# Patient Record
Sex: MALE | Race: WHITE | NOT HISPANIC OR LATINO | Employment: FULL TIME | URBAN - METROPOLITAN AREA
[De-identification: names, ages, dates, MRNs, and addresses within clinical notes are randomized per-mention and may not be internally consistent; named-entity substitution may affect disease eponyms.]

---

## 2019-12-09 LAB
INR BLD: 2.5 (ref 0.9–1.1)
PROTHROMBIN TIME: 29 SEC (ref 9.7–12.7)

## 2023-02-25 LAB
INR IN PPP BY COAGULATION ASSAY: 2 (ref 0.9–1.1)
PROTHROMBIN TIME (PT) IN PPP BY COAGULATION ASSAY: 23.6 SEC (ref 9.8–13.4)

## 2023-04-01 ENCOUNTER — APPOINTMENT (OUTPATIENT)
Dept: LAB | Facility: LAB | Age: 68
End: 2023-04-01
Payer: COMMERCIAL

## 2023-04-01 LAB
INR IN PPP BY COAGULATION ASSAY: 2.4 (ref 0.9–1.1)
PROTHROMBIN TIME (PT) IN PPP BY COAGULATION ASSAY: 28.6 SEC (ref 9.8–13.4)

## 2023-04-28 LAB
ALANINE AMINOTRANSFERASE (SGPT) (U/L) IN SER/PLAS: 22 U/L (ref 10–52)
ALBUMIN (G/DL) IN SER/PLAS: 4.5 G/DL (ref 3.4–5)
ALKALINE PHOSPHATASE (U/L) IN SER/PLAS: 87 U/L (ref 33–136)
ANION GAP IN SER/PLAS: 10 MMOL/L (ref 10–20)
ASPARTATE AMINOTRANSFERASE (SGOT) (U/L) IN SER/PLAS: 14 U/L (ref 9–39)
BILIRUBIN TOTAL (MG/DL) IN SER/PLAS: 0.9 MG/DL (ref 0–1.2)
CALCIUM (MG/DL) IN SER/PLAS: 9.5 MG/DL (ref 8.6–10.3)
CARBON DIOXIDE, TOTAL (MMOL/L) IN SER/PLAS: 27 MMOL/L (ref 21–32)
CHLORIDE (MMOL/L) IN SER/PLAS: 107 MMOL/L (ref 98–107)
CHOLESTEROL (MG/DL) IN SER/PLAS: 124 MG/DL (ref 0–199)
CHOLESTEROL IN HDL (MG/DL) IN SER/PLAS: 34.6 MG/DL
CHOLESTEROL/HDL RATIO: 3.6
CREATININE (MG/DL) IN SER/PLAS: 1.06 MG/DL (ref 0.5–1.3)
ERYTHROCYTE DISTRIBUTION WIDTH (RATIO) BY AUTOMATED COUNT: 15.7 % (ref 11.5–14.5)
ERYTHROCYTE MEAN CORPUSCULAR HEMOGLOBIN CONCENTRATION (G/DL) BY AUTOMATED: 32.8 G/DL (ref 32–36)
ERYTHROCYTE MEAN CORPUSCULAR VOLUME (FL) BY AUTOMATED COUNT: 95 FL (ref 80–100)
ERYTHROCYTES (10*6/UL) IN BLOOD BY AUTOMATED COUNT: 4.41 X10E12/L (ref 4.5–5.9)
GFR MALE: 77 ML/MIN/1.73M2
GLUCOSE (MG/DL) IN SER/PLAS: 84 MG/DL (ref 74–99)
HEMATOCRIT (%) IN BLOOD BY AUTOMATED COUNT: 42.1 % (ref 41–52)
HEMOGLOBIN (G/DL) IN BLOOD: 13.8 G/DL (ref 13.5–17.5)
INR IN PPP BY COAGULATION ASSAY: 2.2 (ref 0.9–1.1)
LDL: 69 MG/DL (ref 0–99)
LEUKOCYTES (10*3/UL) IN BLOOD BY AUTOMATED COUNT: 8.1 X10E9/L (ref 4.4–11.3)
MAGNESIUM (MG/DL) IN SER/PLAS: 2.06 MG/DL (ref 1.6–2.4)
NATRIURETIC PEPTIDE B (PG/ML) IN SER/PLAS: 78 PG/ML (ref 0–99)
PLATELETS (10*3/UL) IN BLOOD AUTOMATED COUNT: 233 X10E9/L (ref 150–450)
POTASSIUM (MMOL/L) IN SER/PLAS: 4.5 MMOL/L (ref 3.5–5.3)
PROSTATE SPECIFIC AG (NG/ML) IN SER/PLAS: 5.26 NG/ML (ref 0–4)
PROTEIN TOTAL: 7 G/DL (ref 6.4–8.2)
PROTHROMBIN TIME (PT) IN PPP BY COAGULATION ASSAY: 25.1 SEC (ref 9.8–13.4)
SODIUM (MMOL/L) IN SER/PLAS: 139 MMOL/L (ref 136–145)
TRIGLYCERIDE (MG/DL) IN SER/PLAS: 101 MG/DL (ref 0–149)
UREA NITROGEN (MG/DL) IN SER/PLAS: 23 MG/DL (ref 6–23)
VLDL: 20 MG/DL (ref 0–40)

## 2023-06-10 LAB
INR IN PPP BY COAGULATION ASSAY: 2.2 (ref 0.9–1.1)
PROTHROMBIN TIME (PT) IN PPP BY COAGULATION ASSAY: 25.6 SEC (ref 9.8–13.4)

## 2023-07-20 LAB
INR IN PPP BY COAGULATION ASSAY: 2.5 (ref 0.9–1.1)
PROTHROMBIN TIME (PT) IN PPP BY COAGULATION ASSAY: 29 SEC (ref 9.8–12.8)

## 2023-08-11 ENCOUNTER — OFFICE VISIT (OUTPATIENT)
Dept: PRIMARY CARE | Facility: CLINIC | Age: 68
End: 2023-08-11
Payer: COMMERCIAL

## 2023-08-11 ENCOUNTER — APPOINTMENT (OUTPATIENT)
Dept: PRIMARY CARE | Facility: CLINIC | Age: 68
End: 2023-08-11
Payer: COMMERCIAL

## 2023-08-11 DIAGNOSIS — Z00.00 ROUTINE MEDICAL EXAM: Primary | ICD-10-CM

## 2023-08-11 DIAGNOSIS — I48.91 ATRIAL FIBRILLATION, UNSPECIFIED TYPE (MULTI): ICD-10-CM

## 2023-08-11 DIAGNOSIS — Z12.11 COLON CANCER SCREENING: ICD-10-CM

## 2023-08-11 PROBLEM — E78.5 DYSLIPIDEMIA: Status: ACTIVE | Noted: 2023-08-11

## 2023-08-11 PROBLEM — I51.9 CHRONIC SYSTOLIC DYSFUNCTION OF LEFT VENTRICLE: Status: ACTIVE | Noted: 2023-08-11

## 2023-08-11 PROBLEM — M48.061 LUMBAR FORAMINAL STENOSIS: Status: ACTIVE | Noted: 2023-08-11

## 2023-08-11 PROBLEM — R97.20 ELEVATED PSA: Status: ACTIVE | Noted: 2023-08-11

## 2023-08-11 PROBLEM — N40.0 BPH WITHOUT URINARY OBSTRUCTION: Status: ACTIVE | Noted: 2023-08-11

## 2023-08-11 PROCEDURE — 90677 PCV20 VACCINE IM: CPT | Performed by: FAMILY MEDICINE

## 2023-08-11 PROCEDURE — 90471 IMMUNIZATION ADMIN: CPT | Performed by: FAMILY MEDICINE

## 2023-08-11 PROCEDURE — 1036F TOBACCO NON-USER: CPT | Performed by: FAMILY MEDICINE

## 2023-08-11 PROCEDURE — 1125F AMNT PAIN NOTED PAIN PRSNT: CPT | Performed by: FAMILY MEDICINE

## 2023-08-11 PROCEDURE — 99387 INIT PM E/M NEW PAT 65+ YRS: CPT | Performed by: FAMILY MEDICINE

## 2023-08-11 RX ORDER — WARFARIN SODIUM 2.5 MG/1
TABLET ORAL
COMMUNITY
End: 2024-04-16 | Stop reason: WASHOUT

## 2023-08-11 RX ORDER — METOPROLOL SUCCINATE 25 MG/1
25 TABLET, EXTENDED RELEASE ORAL DAILY
COMMUNITY
End: 2023-12-26 | Stop reason: SDUPTHER

## 2023-08-11 RX ORDER — LISINOPRIL 5 MG/1
5 TABLET ORAL DAILY
COMMUNITY
End: 2024-01-22 | Stop reason: SDUPTHER

## 2023-08-11 RX ORDER — DILTIAZEM HYDROCHLORIDE 240 MG/1
240 CAPSULE, COATED, EXTENDED RELEASE ORAL DAILY
COMMUNITY
Start: 2023-06-29 | End: 2024-02-05 | Stop reason: SDUPTHER

## 2023-08-11 RX ORDER — ATORVASTATIN CALCIUM 10 MG/1
1 TABLET, FILM COATED ORAL DAILY
COMMUNITY
End: 2024-04-24 | Stop reason: SDUPTHER

## 2023-08-11 NOTE — PROGRESS NOTES
No concerns today. pt has regular dental visits. no vision problems. no hearing loss.   Lifestyle: healthy diet. no weight concerns. Pt exercises regularly.   he does not use tobacco. he denies alcohol abuse.   Reproductive health: the patient is sexually active. no erectile dysfunction. patient is not at high risk for prostate cancer.   Safety elements used: seat belt, safe driving habits and smoke detector.   no passive smoke exposure, safe sexual behavior, no chemical abuse, no domestic violence, no anxiety symptoms, no depression symptoms, safe driving habits, no driving violations, no history of DUI and no tuberculosis exposure.     Review of Systems  Constitutional: no chills, no fever and no night sweats.   Eyes: no blurred vision and no eyesight problems.   ENT: no hearing loss, no nasal congestion, no nasal discharge, no hoarseness and no sore throat.   Neck: no mass(es) and no swelling.   Cardiovascular: no chest pain, no intermittent leg claudication, no lower extremity edema, no palpitations and no syncope.   Respiratory: no cough, no shortness of breath during exertion, no shortness of breath at rest and no wheezing.   Gastrointestinal: no abdominal pain, no constipation, no blood in stools, no diarrhea, no melena, no nausea, no rectal pain and no vomiting.   Genitourinary: no dysuria, no change in urinary frequency, no genital lesions, no hematuria, no urinary hesitancy, no incontinence, no nocturia, no lump, no sexual dysfunction, no testicular pain and no feelings of urinary urgency.   Musculoskeletal: no arthralgias, no back pain, no localized joint pain, no myalgias, mild b.l feet  pain.   Integumentary: no new skin lesions, no rashes and no skin wound.   Neurological: no confusion, no convulsions, no difficulty walking, no dizziness, no headache, no limb weakness, no memory changes, no numbness, no speech difficulties, no syncope and no tingling.   Psychiatric: no anxiety, no personality change,  no depression, no emotional problems, no homicidal thoughts, no anhedonia, no sleep disturbances and no substance use disorders.   Endocrine: no changes in appetite, no deepening of the voice, no polyuria, no feelings of weakness, no heat/cold intolerance, no muscle weakness, no polydipsia, no recent weight gain and no recent weight loss.   Hematologic/Lymphatic: no tendency for easy bleeding, no tendency for easy bruising, no recurrent infections and no swollen glands.     Physical Exam  Constitutional: Alert and in no acute distress. Well developed, well nourished.   Head and Face: Head and face: Normal.  Palpation of the face and sinuses: Normal.    Eyes: Normal external exam. Pupils: PERRLA, EOMI.   Ears, Nose, Mouth, and Throat: External inspection of ears and nose: Normal.  Hearing: Normal.  Nasal mucosa, septum, and turbinates: Normal.  Oropharynx: Normal.    Neck: No neck mass was observed. Supple. Thyroid not enlarged and there were no palpable thyroid nodules.   Cardiovascular: Heart rate and rhythm were normal, normal S1 and S2, no gallops, no murmurs and no pericardial rub. Pedal pulses: Normal. No peripheral edema.   Pulmonary: No respiratory distress. Clear bilateral breath sounds.   Chest: Chest: Normal.    Abdomen: Soft nontender; no abdominal mass palpated. No organomegaly. No hernias.   Musculoskeletal: Gait and station: Normal. No joint swelling seen, normal movements of all extremities. Range of motion: Normal.  Muscle strength/tone: Normal.  mild tenderness at b/l feet  Skin: Normal skin color and pigmentation, normal skin turgor, and no rash. Palpation of skin and subcutaneous tissue: Normal.    Neurologic: Cranial nerves 2-12 grossly intact. Deep tendon reflexes were 2+ and symmetric. Sensation: Normal. Coordination: Normal.    Psychiatric: Judgment and insight: Intact. Alert and oriented x 3. Recent and remote memory: Normal.  Mood and affect: Normal.   Lymphatic: No cervical lymphadenopathy.  Palpation of lymph nodes in axillae: Normal.      Unremarkable PE except overweight,  recommend nutritionist eval. Recommend DASH diet and regular exercise. check CBC, CMP, lipid, TSH.  Advise eye exam by an OD yearly and dental exam every 6 months. will monitor lipid and weight yearly. Recommend sunscreen application if exposed to the sun when UV index is above 2.  Recommend Hep C, hep B, HIV test. recommend   shingle, Tdap, hepB, pneumonia, covid  shot.  Pt declined lab test.  Recommend colonoscopy, Check  cologuard.

## 2023-09-05 LAB — NONINV COLON CA DNA+OCC BLD SCRN STL QL: NEGATIVE

## 2023-09-09 LAB
INR IN PPP BY COAGULATION ASSAY: 3.2 (ref 0.9–1.1)
PROTHROMBIN TIME (PT) IN PPP BY COAGULATION ASSAY: 36 SEC (ref 9.8–12.8)

## 2023-09-22 LAB
INR IN PPP BY COAGULATION ASSAY: 3.2 (ref 0.9–1.1)
PROTHROMBIN TIME (PT) IN PPP BY COAGULATION ASSAY: 36.6 SEC (ref 9.8–12.8)

## 2023-10-11 ENCOUNTER — LAB (OUTPATIENT)
Dept: LAB | Facility: LAB | Age: 68
End: 2023-10-11
Payer: COMMERCIAL

## 2023-10-11 DIAGNOSIS — I48.91 UNSPECIFIED ATRIAL FIBRILLATION (MULTI): Primary | ICD-10-CM

## 2023-10-11 LAB
INR PPP: 2.4 (ref 0.9–1.1)
PROTHROMBIN TIME: 27.6 SECONDS (ref 9.8–12.8)

## 2023-10-11 PROCEDURE — 85610 PROTHROMBIN TIME: CPT

## 2023-10-11 PROCEDURE — 36415 COLL VENOUS BLD VENIPUNCTURE: CPT

## 2023-10-12 ENCOUNTER — TELEPHONE (OUTPATIENT)
Dept: PHARMACY | Facility: HOSPITAL | Age: 68
End: 2023-10-12
Payer: COMMERCIAL

## 2023-10-13 ENCOUNTER — ANTICOAGULATION - WARFARIN VISIT (OUTPATIENT)
Dept: PHARMACY | Facility: HOSPITAL | Age: 68
End: 2023-10-13
Payer: COMMERCIAL

## 2023-10-13 DIAGNOSIS — I48.91 ATRIAL FIBRILLATION, UNSPECIFIED TYPE (MULTI): Primary | ICD-10-CM

## 2023-10-13 NOTE — PROGRESS NOTES
Mr. Lawrence is a consult from Dr. Fong and he has been on warfarin for 16-17 years. He tested his INR at lab and his INR is 3.2. His INR has been stable for multiple months in a row prior to this slightly high level. He reports taking his warfarin as instructed and reports no missed doses. He reports no others changes in medications, no signs or symptoms of bleeding and no diet changes. His INR today is in range at 2.4.  Since his INR is in range we will continue the weekly regimen of 2.5mg on MWF and 1.25mg all other days. We will recheck in INR in a month.

## 2023-10-13 NOTE — PATIENT INSTRUCTIONS
Today your INR was in range at 2.4 (goal 2-3). We will have you continue your weekly regimen of 2.5mg MWF and 1.25 mg all other days. We will retest in 4 weeks.  If any questions arise do not hesitate to call us at [phone] M-F from 8:30am-4:30pm or at   [phone] after 5pm or on the weekends. Continue to monitor for any excess bleeding or bruising, especially for blood in your stool.

## 2023-10-27 ENCOUNTER — OFFICE VISIT (OUTPATIENT)
Dept: CARDIOLOGY | Facility: CLINIC | Age: 68
End: 2023-10-27
Payer: COMMERCIAL

## 2023-10-27 VITALS
HEART RATE: 60 BPM | BODY MASS INDEX: 28.37 KG/M2 | DIASTOLIC BLOOD PRESSURE: 80 MMHG | WEIGHT: 221 LBS | SYSTOLIC BLOOD PRESSURE: 118 MMHG

## 2023-10-27 DIAGNOSIS — I51.9 CHRONIC SYSTOLIC DYSFUNCTION OF LEFT VENTRICLE: ICD-10-CM

## 2023-10-27 DIAGNOSIS — E78.5 DYSLIPIDEMIA: ICD-10-CM

## 2023-10-27 DIAGNOSIS — I10 PRIMARY HYPERTENSION: ICD-10-CM

## 2023-10-27 DIAGNOSIS — I48.21 PERMANENT ATRIAL FIBRILLATION (MULTI): Primary | ICD-10-CM

## 2023-10-27 PROCEDURE — 1036F TOBACCO NON-USER: CPT | Performed by: PHYSICIAN ASSISTANT

## 2023-10-27 PROCEDURE — 1125F AMNT PAIN NOTED PAIN PRSNT: CPT | Performed by: PHYSICIAN ASSISTANT

## 2023-10-27 PROCEDURE — 1159F MED LIST DOCD IN RCRD: CPT | Performed by: PHYSICIAN ASSISTANT

## 2023-10-27 PROCEDURE — 99213 OFFICE O/P EST LOW 20 MIN: CPT | Performed by: PHYSICIAN ASSISTANT

## 2023-10-27 PROCEDURE — 1160F RVW MEDS BY RX/DR IN RCRD: CPT | Performed by: PHYSICIAN ASSISTANT

## 2023-10-27 PROCEDURE — 3074F SYST BP LT 130 MM HG: CPT | Performed by: PHYSICIAN ASSISTANT

## 2023-10-27 PROCEDURE — 3079F DIAST BP 80-89 MM HG: CPT | Performed by: PHYSICIAN ASSISTANT

## 2023-10-27 RX ORDER — CLINDAMYCIN PHOSPHATE 10 UG/ML
LOTION TOPICAL
COMMUNITY
Start: 2023-04-06

## 2023-10-27 RX ORDER — ACETAMINOPHEN 325 MG/1
TABLET ORAL
COMMUNITY
Start: 2020-08-02

## 2023-10-27 RX ORDER — METRONIDAZOLE 7.5 MG/G
CREAM TOPICAL
COMMUNITY
Start: 2022-04-07

## 2023-10-27 RX ORDER — CYCLOBENZAPRINE HCL 10 MG
TABLET ORAL
COMMUNITY
Start: 2020-12-01 | End: 2023-11-15

## 2023-10-27 ASSESSMENT — ENCOUNTER SYMPTOMS
WEAKNESS: 0
ABDOMINAL PAIN: 0
DYSURIA: 0
FEVER: 0
PALPITATIONS: 0
VOMITING: 0
ORTHOPNEA: 0
SHORTNESS OF BREATH: 0
DIARRHEA: 0
WHEEZING: 0
NAUSEA: 0

## 2023-10-27 NOTE — PROGRESS NOTES
Cardiology Follow Up  Chief Complaint:   Here for 6 month follow up.       History Of Present Illness:    This is a 67-year-old male here for follow-up regarding his history of chronic atrial fibrillation, anticoagulation with warfarin, history of primum ASD with prior surgical repair as well as mitral valve repair done in 1980 and 1981, moderate mitral valve regurgitation, cardiomyopathy with an ejection fraction of 45 to 50%, hypertension, and dyslipidemia on atorvastatin therapy. The patient was last evaluated by me in March 2022. At that visit I had ordered an echocardiogram referred the patient to the anticoagulation clinic, and asked that he follow-up in 6 months and sooner if necessary. Echocardiogram done in March 2022 showed mildly reduced left ventricular systolic function with an ejection fraction 45 to 50%, grade 2 diastolic dysfunction, normal right ventricular systolic function, biatrial dilatation, moderate mitral valve regurgitation. Patient was subsequently started on lisinopril 5 mg daily and metoprolol succinate 25 mg daily for his LV dysfunction. The patient was subsequently seen by the cardiology PA in September 2022 at which time he was asked to follow-up in April 2023. ECG done today showed atrial fibrillation with a heart rate of 56 bpm. The patient reports that he has been feeling generally well from the cardiac standpoint since the last visit. He denies any new chest pain, shortness of breath, palpitations and lightheadedness. He states that his fatigue likely due to the side effect of metoprolol has somehow improved overtime. He has not been tested for sleep apnea. He also reports that he has been trying to stay physically active. He states that he takes all of his medications as prescribed. During my exam, he was resting comfortably on the exam table.   10-27-23: This a very pleasant 67-year-old patient known to Dr. Fong.  Above history noted.  Patient states in the last 6 months no acute  issues or hospitalizations.  Patient has chronic atrial fibrillation but no real symptoms of atrial fibrillation.  He has been taking his medication as prescribed including his warfarin with most recent INR on October 13 therapeutic at 2.4.  Patient exercises 3 days of the week and has no exertional complaints or concerns at this time.             Last Recorded Vitals:  Vitals:    10/27/23 1531   BP: 118/80   Pulse: 60   Weight: 100 kg (221 lb)       Past Medical History:  He has a past medical history of ASD (atrial septal defect), Cardiomyopathy (CMS/HCC), Chronic atrial fibrillation with RVR (CMS/HCC), Dyslipidemia, Fatigue, Hypertension, Mitral valve regurgitation, Other specified health status, Personal history of other diseases of the circulatory system, Personal history of other diseases of the circulatory system, Personal history of other diseases of the circulatory system, Personal history of other endocrine, nutritional and metabolic disease, and Warfarin anticoagulation.    Past Surgical History:  He has a past surgical history that includes Cardiac surgery (10/12/2015); Appendectomy (10/12/2015); Other surgical history (10/12/2015); Other surgical history (10/12/2015); Other surgical history (10/12/2015); Other surgical history (12/30/2020); and Other surgical history (06/20/2019).      Social History:  He reports that he has never smoked. He has never used smokeless tobacco. He reports that he does not currently use alcohol. He reports that he does not use drugs.    Family History:  Family History   Problem Relation Name Age of Onset    No Known Problems Mother      Heart attack Father          Allergies:  Halothane    Outpatient Medications:  Current Outpatient Medications   Medication Instructions    acetaminophen (Tylenol) 325 mg tablet 2 tablet EVERY 6 HOURS (route: oral)    atorvastatin (Lipitor) 10 mg tablet 1 tablet, oral, Daily    clindamycin (Cleocin T) 1 % lotion 1 Application     cyclobenzaprine (Flexeril) 10 mg tablet 1 tablet 3 TIMES DAILY (route: oral)    dilTIAZem CD (CARDIZEM CD) 240 mg, oral, Daily    Jantoven 2.5 mg tablet take 1/2 to 1 tablet by mouth once daily as directed    lisinopril 5 mg, oral, Daily    metoprolol succinate XL (TOPROL-XL) 25 mg, oral, Daily    metroNIDAZOLE (Metrocream) 0.75 % cream 0     Review of Systems   Constitutional: Negative for fever and malaise/fatigue.   Cardiovascular:  Negative for chest pain, orthopnea and palpitations.   Respiratory:  Negative for shortness of breath and wheezing.    Skin:  Negative for itching and rash.   Gastrointestinal:  Negative for abdominal pain, diarrhea, nausea and vomiting.   Genitourinary:  Negative for dysuria.   Neurological:  Negative for weakness.      Physical Exam  Constitutional:       General: He is not in acute distress.  HENT:      Mouth/Throat:      Mouth: Mucous membranes are moist.   Neck:      Comments: No JVD  Cardiovascular:      Rate and Rhythm: Normal rate. Rhythm irregular.      Heart sounds: No murmur heard.     Comments: Chronic AF  Pulmonary:      Effort: Pulmonary effort is normal.      Breath sounds: Normal breath sounds.   Abdominal:      General: Abdomen is flat. Bowel sounds are normal.      Palpations: Abdomen is soft.   Musculoskeletal:         General: No swelling.   Skin:     General: Skin is warm and dry.   Neurological:      General: No focal deficit present.      Mental Status: He is alert and oriented to person, place, and time.   Psychiatric:         Mood and Affect: Mood normal.          Last Labs:  CBC -  Lab Results   Component Value Date    WBC 8.1 04/28/2023    HGB 13.8 04/28/2023    HCT 42.1 04/28/2023    MCV 95 04/28/2023     04/28/2023       CMP -  Lab Results   Component Value Date    CALCIUM 9.5 04/28/2023    PROT 7.0 04/28/2023    ALBUMIN 4.5 04/28/2023    AST 14 04/28/2023    ALT 22 04/28/2023    ALKPHOS 87 04/28/2023    BILITOT 0.9 04/28/2023       LIPID PANEL -    Lab Results   Component Value Date    CHOL 124 04/28/2023    TRIG 101 04/28/2023    HDL 34.6 (A) 04/28/2023    CHHDL 3.6 04/28/2023    LDLF 69 04/28/2023    VLDL 20 04/28/2023       RENAL FUNCTION PANEL -   Lab Results   Component Value Date    GLUCOSE 84 04/28/2023     04/28/2023    K 4.5 04/28/2023     04/28/2023    CO2 27 04/28/2023    ANIONGAP 10 04/28/2023    BUN 23 04/28/2023    CREATININE 1.06 04/28/2023    GFRMALE 77 04/28/2023    CALCIUM 9.5 04/28/2023    ALBUMIN 4.5 04/28/2023        Lab Results   Component Value Date    BNP 78 04/28/2023       Last Cardiology Tests:    Echo: 5/23--CONCLUSIONS:  1. Left ventricular systolic function is normal with a 60-65% estimated ejection fraction.  2. The left atrium is moderate to severely dilated.  3. The right atrium is moderately dilated.  4. Moderate mitral valve regurgitation.  5. Mild to moderately elevated right ventricular systolic pressure.  6. Moderate tricuspid regurgitation.         Lab review: I have personally reviewed the laboratory result(s).    Assessment/Plan   Problem List Items Addressed This Visit             ICD-10-CM       Cardiac and Vasculature    Atrial fibrillation (CMS/HCC) - Primary I48.91    Relevant Orders    Follow Up In Cardiology    Chronic systolic dysfunction of left ventricle I51.9    Relevant Orders    Follow Up In Cardiology    Dyslipidemia E78.5    Hypertension I10   Chronic atrial fibrillation--patient has no awareness of his chronic atrial fibrillation and ventricular rates are very well controlled on physical examination.  He is anticoagulated with warfarin with most recent INR on 1013 therapeutic at 2.4.  He will continue the regimen.  History of chronic systolic heart failure--patient's most recent echocardiogram has showed normalization of the EF back to normal.  He has no signs or symptoms of congestive heart failure.  Occasionally with being on his feet he will have a little bit of ankle edema but he does  wear compression stockings regularly.  Patient advised to avoid excess salt in his diet as his blood pressures are also very well controlled.  Hypertension--see above.  Hyperlipidemia--HDL is little bit low but otherwise his total cholesterol and LDL parameters are within normal limits on current medication.  Overall this patient is doing very well from a cardiac standpoint.  He denies any new symptoms of concern.  He has had normalization of his LV systolic function and has no awareness of his A-fib or signs or symptoms of CHF.  He will continue his medical therapy and will arrange follow-up with Dr. Fong in 6 months time.      Jass Valdes PA-C  10/28/2023  3:01 PM

## 2023-10-27 NOTE — PATIENT INSTRUCTIONS
Please continue your current medications.  If you have any change in your cardiorespiratory status please call the office right away.  We will arrange for your yearly visit with your cardiologist in 6 months.

## 2023-11-08 ENCOUNTER — TELEPHONE (OUTPATIENT)
Dept: UROLOGY | Facility: CLINIC | Age: 68
End: 2023-11-08
Payer: COMMERCIAL

## 2023-11-08 DIAGNOSIS — R97.20 ELEVATED PSA: Primary | ICD-10-CM

## 2023-11-08 NOTE — TELEPHONE ENCOUNTER
Can you please put a PSA order in for him to have done prior to appointment in a few weeks  Thank you

## 2023-11-11 ENCOUNTER — LAB (OUTPATIENT)
Dept: LAB | Facility: LAB | Age: 68
End: 2023-11-11
Payer: COMMERCIAL

## 2023-11-11 DIAGNOSIS — I48.91 UNSPECIFIED ATRIAL FIBRILLATION (MULTI): Primary | ICD-10-CM

## 2023-11-11 DIAGNOSIS — R97.20 ELEVATED PSA: ICD-10-CM

## 2023-11-11 LAB
INR PPP: 2.3 (ref 0.9–1.1)
PROTHROMBIN TIME: 25.9 SECONDS (ref 9.8–12.8)
PSA SERPL-MCNC: 4.93 NG/ML

## 2023-11-11 PROCEDURE — 85610 PROTHROMBIN TIME: CPT

## 2023-11-11 PROCEDURE — 36415 COLL VENOUS BLD VENIPUNCTURE: CPT

## 2023-11-11 PROCEDURE — 84153 ASSAY OF PSA TOTAL: CPT

## 2023-11-15 ENCOUNTER — OFFICE VISIT (OUTPATIENT)
Dept: UROLOGY | Facility: CLINIC | Age: 68
End: 2023-11-15
Payer: COMMERCIAL

## 2023-11-15 VITALS — SYSTOLIC BLOOD PRESSURE: 131 MMHG | DIASTOLIC BLOOD PRESSURE: 90 MMHG

## 2023-11-15 DIAGNOSIS — R97.20 ELEVATED PSA: Primary | ICD-10-CM

## 2023-11-15 PROCEDURE — 1160F RVW MEDS BY RX/DR IN RCRD: CPT | Performed by: STUDENT IN AN ORGANIZED HEALTH CARE EDUCATION/TRAINING PROGRAM

## 2023-11-15 PROCEDURE — 99213 OFFICE O/P EST LOW 20 MIN: CPT | Performed by: STUDENT IN AN ORGANIZED HEALTH CARE EDUCATION/TRAINING PROGRAM

## 2023-11-15 PROCEDURE — 3075F SYST BP GE 130 - 139MM HG: CPT | Performed by: STUDENT IN AN ORGANIZED HEALTH CARE EDUCATION/TRAINING PROGRAM

## 2023-11-15 PROCEDURE — 3080F DIAST BP >= 90 MM HG: CPT | Performed by: STUDENT IN AN ORGANIZED HEALTH CARE EDUCATION/TRAINING PROGRAM

## 2023-11-15 PROCEDURE — 1159F MED LIST DOCD IN RCRD: CPT | Performed by: STUDENT IN AN ORGANIZED HEALTH CARE EDUCATION/TRAINING PROGRAM

## 2023-11-15 PROCEDURE — 1125F AMNT PAIN NOTED PAIN PRSNT: CPT | Performed by: STUDENT IN AN ORGANIZED HEALTH CARE EDUCATION/TRAINING PROGRAM

## 2023-11-15 PROCEDURE — 1036F TOBACCO NON-USER: CPT | Performed by: STUDENT IN AN ORGANIZED HEALTH CARE EDUCATION/TRAINING PROGRAM

## 2023-11-15 NOTE — PROGRESS NOTES
[facility] - Dr. Bernard Blancas    Established Patient  Visit    PCP: Blanco Avendano MD PhD    Chief Complaint/Reason for visit: FU elevated PSA    HPI:   Elevated PSA  [facility] 5/2023 26% chance csPCA  No new  symptoms   No MRI contraindications     Lab Results   Component Value Date    PSA 4.93 (H) 11/11/2023    PSA 5.26 (H) 04/28/2023    PSA 4.75 (H) 06/03/2022     ED -     Last visit:  [facility] for further risk stratification  Defers MRI for now  Will call with urine biomarker results - if high risk would consider MRI  FUV in 6 months with PSA prior     5/24/23  1. Elevated PSA   chronic problem that is worsening   6/3/22 PSA was 4.75.  8/2021 PSA 4.19  3/2021 PSA 3.88  Negative biopsy 2019  No family history   No chemical exposures   No MRI contraindications   Father passed at 55, mother passed at 91 3 years ago      2. ED  No new cardiac symptoms  has not tried sildenafil due to lack of partner   On coumadin for A-fib      9/1/22  1. Elevated PSA  6/3/22 PSA was 4.75.  8/2021 PSA 4.19  3/2021 PSA 3.88  Negative biopsy 2019  No family history   No chemical exposures   No MRI contraindications      2. ED  Can sometimes penetrate, more difficult   No MI history  can complete 4 METs  On coumadin for A-fib           Past Medical History:   Diagnosis Date    ASD (atrial septal defect)     Cardiomyopathy (CMS/HCC)     Chronic atrial fibrillation with RVR (CMS/HCC)     Dyslipidemia     Fatigue     Hypertension     Mitral valve regurgitation     Other specified health status     No pertinent past medical history    Personal history of other diseases of the circulatory system     History of congestive heart failure    Personal history of other diseases of the circulatory system     History of atrial fibrillation    Personal history of other diseases of the circulatory system     History of hypertension    Personal history of other endocrine, nutritional and metabolic disease     History of hypercholesterolemia     Warfarin anticoagulation      Past Surgical History:   Procedure Laterality Date    APPENDECTOMY  10/12/2015    Appendectomy    CARDIAC SURGERY  10/12/2015    Heart Surgery    OTHER SURGICAL HISTORY  10/12/2015    Repair Of Congenital Atrial Septal Defect    OTHER SURGICAL HISTORY  10/12/2015    Mitral Valve Paravalvular Leak Repair    OTHER SURGICAL HISTORY  10/12/2015    Hernia Repair Inguinal Unilateral    OTHER SURGICAL HISTORY  12/30/2020    Lumbar laminectomy    OTHER SURGICAL HISTORY  06/20/2019    Prostate needle biopsy     Social History     Socioeconomic History    Marital status: Single     Spouse name: Not on file    Number of children: Not on file    Years of education: Not on file    Highest education level: Not on file   Occupational History    Not on file   Tobacco Use    Smoking status: Never    Smokeless tobacco: Never   Substance and Sexual Activity    Alcohol use: Not Currently    Drug use: Never    Sexual activity: Not on file   Other Topics Concern    Not on file   Social History Narrative    Not on file     Social Determinants of Health     Financial Resource Strain: Not on file   Food Insecurity: Not on file   Transportation Needs: Not on file   Physical Activity: Not on file   Stress: Not on file   Social Connections: Not on file   Intimate Partner Violence: Not on file   Housing Stability: Not on file     Current Outpatient Medications   Medication Instructions    acetaminophen (Tylenol) 325 mg tablet 2 tablet EVERY 6 HOURS (route: oral)    atorvastatin (Lipitor) 10 mg tablet 1 tablet, oral, Daily    clindamycin (Cleocin T) 1 % lotion 1 Application    cyclobenzaprine (Flexeril) 10 mg tablet 1 tablet 3 TIMES DAILY (route: oral)    dilTIAZem CD (CARDIZEM CD) 240 mg, oral, Daily    Jantoven 2.5 mg tablet take 1/2 to 1 tablet by mouth once daily as directed    lisinopril 5 mg, oral, Daily    metoprolol succinate XL (TOPROL-XL) 25 mg, oral, Daily    metroNIDAZOLE (Metrocream) 0.75 % cream 0      Allergies   Allergen Reactions    Halothane Unknown          Physical Exam:  General: Alert, cooperative, no acute distress  Eyes: Sclera clear  Cardiac: Extremities are warm and well perfused  Lungs: Breathing non-labored. Speaking in clear and complete sentences.  MSK: Ambulatory with steady gait   Neuro: Alert and oriented to person, place, and time  Psych: Normal mood and affect  Skin: No obvious lesions or rashes    Assessment and Plan:    1. Elevated PSA  Discussed risks and benefits of prostate MRI, defers for now  PSA stable  Defers biopsy  FUV 6 months PSA and MOLLY prior     I discussed with the patient that prostate cancer is the most common internal malignancy in men in the [place], and that around 12% of [place] men will be diagnosed with prostate cancer in their lifetime. I also explained that the lifetime risk of dying from prostate cancer, however, is only 3%. I discussed that PSA is a screening test to assess the risk of prostate cancer but is not diagnostic of prostate cancer, and that tissue biopsy is needed to confirm the diagnosis.

## 2023-11-22 ENCOUNTER — ANTICOAGULATION - WARFARIN VISIT (OUTPATIENT)
Dept: PHARMACY | Facility: HOSPITAL | Age: 68
End: 2023-11-22
Payer: COMMERCIAL

## 2023-11-22 DIAGNOSIS — I48.91 ATRIAL FIBRILLATION, UNSPECIFIED TYPE (MULTI): Primary | ICD-10-CM

## 2023-11-22 NOTE — PATIENT INSTRUCTIONS
Your INR was in range at 2.3 (goal 2-3). We will have you continue your weekly regimen of 2.5mg MWF and 1.25 mg all other days. We will retest in 4 weeks.  If any questions arise do not hesitate to call us at [phone] M-F from 8:30am-4:30pm or at   [phone] after 5pm or on the weekends. Continue to monitor for any excess bleeding or bruising, especially for blood in your stool.

## 2023-11-22 NOTE — PROGRESS NOTES
Mr. Lawrence is a consult from Dr. Fong and he has been on warfarin for 16-17 years for atrial fibrillation. His INR has been stable. He reports taking his warfarin as instructed and reports no missed doses. He reports no others changes in medications, no signs or symptoms of bleeding and no diet changes. His INR today is in range at 2.3.  Since his INR is in range we will continue the weekly regimen of 2.5mg on MWF and 1.25mg all other days. We will recheck in INR in a month. He stated he tested earlier this past month but we did not receive the results. I discussed with him if he tests and he does not receive a call from us he should be calling our clinic to let us know.

## 2023-12-16 ENCOUNTER — LAB (OUTPATIENT)
Dept: LAB | Facility: LAB | Age: 68
End: 2023-12-16
Payer: COMMERCIAL

## 2023-12-16 DIAGNOSIS — I48.91 UNSPECIFIED ATRIAL FIBRILLATION (MULTI): ICD-10-CM

## 2023-12-16 LAB
INR PPP: 2 (ref 0.9–1.1)
PROTHROMBIN TIME: 22.8 SECONDS (ref 9.8–12.8)

## 2023-12-16 PROCEDURE — 36415 COLL VENOUS BLD VENIPUNCTURE: CPT

## 2023-12-16 PROCEDURE — 85610 PROTHROMBIN TIME: CPT

## 2023-12-18 ENCOUNTER — OFFICE VISIT (OUTPATIENT)
Dept: DERMATOLOGY | Facility: CLINIC | Age: 68
End: 2023-12-18
Payer: COMMERCIAL

## 2023-12-18 DIAGNOSIS — L57.8 DIFFUSE PHOTODAMAGE OF SKIN: ICD-10-CM

## 2023-12-18 DIAGNOSIS — L82.1 SEBORRHEIC KERATOSIS: ICD-10-CM

## 2023-12-18 DIAGNOSIS — C44.92 SQUAMOUS CELL CARCINOMA OF SKIN: ICD-10-CM

## 2023-12-18 DIAGNOSIS — B35.3 TINEA PEDIS OF BOTH FEET: ICD-10-CM

## 2023-12-18 DIAGNOSIS — Z85.820 PERSONAL HISTORY OF MALIGNANT MELANOMA OF SKIN: ICD-10-CM

## 2023-12-18 DIAGNOSIS — L57.0 ACTINIC KERATOSIS: ICD-10-CM

## 2023-12-18 DIAGNOSIS — L81.4 LENTIGO: ICD-10-CM

## 2023-12-18 DIAGNOSIS — D22.5 MELANOCYTIC NEVUS OF TRUNK: ICD-10-CM

## 2023-12-18 DIAGNOSIS — D48.5 NEOPLASM OF UNCERTAIN BEHAVIOR OF SKIN: Primary | ICD-10-CM

## 2023-12-18 PROCEDURE — 11301 SHAVE SKIN LESION 0.6-1.0 CM: CPT | Performed by: DERMATOLOGY

## 2023-12-18 PROCEDURE — 88305 TISSUE EXAM BY PATHOLOGIST: CPT | Performed by: DERMATOLOGY

## 2023-12-18 PROCEDURE — 88305 TISSUE EXAM BY PATHOLOGIST: CPT | Mod: TC,DER | Performed by: DERMATOLOGY

## 2023-12-18 PROCEDURE — 1125F AMNT PAIN NOTED PAIN PRSNT: CPT | Performed by: DERMATOLOGY

## 2023-12-18 PROCEDURE — 17004 DESTROY PREMAL LESIONS 15/>: CPT | Performed by: DERMATOLOGY

## 2023-12-18 PROCEDURE — 1159F MED LIST DOCD IN RCRD: CPT | Performed by: DERMATOLOGY

## 2023-12-18 PROCEDURE — 99214 OFFICE O/P EST MOD 30 MIN: CPT | Performed by: DERMATOLOGY

## 2023-12-18 PROCEDURE — 1036F TOBACCO NON-USER: CPT | Performed by: DERMATOLOGY

## 2023-12-18 PROCEDURE — 1160F RVW MEDS BY RX/DR IN RCRD: CPT | Performed by: DERMATOLOGY

## 2023-12-18 ASSESSMENT — DERMATOLOGY QUALITY OF LIFE (QOL) ASSESSMENT
DATE THE QUALITY-OF-LIFE ASSESSMENT WAS COMPLETED: 66826
RATE HOW BOTHERED YOU ARE BY EFFECTS OF YOUR SKIN PROBLEMS ON YOUR ACTIVITIES (EG, GOING OUT, ACCOMPLISHING WHAT YOU WANT, WORK ACTIVITIES OR YOUR RELATIONSHIPS WITH OTHERS): 0 - NEVER BOTHERED
ARE THERE EXCLUSIONS OR EXCEPTIONS FOR THE QUALITY OF LIFE ASSESSMENT: NO
RATE HOW EMOTIONALLY BOTHERED YOU ARE BY YOUR SKIN PROBLEM (FOR EXAMPLE, WORRY, EMBARRASSMENT, FRUSTRATION): 0 - NEVER BOTHERED
RATE HOW BOTHERED YOU ARE BY SYMPTOMS OF YOUR SKIN PROBLEM (EG, ITCHING, STINGING BURNING, HURTING OR SKIN IRRITATION): 0 - NEVER BOTHERED
WHAT SINGLE SKIN CONDITION LISTED BELOW IS THE PATIENT ANSWERING THE QUALITY-OF-LIFE ASSESSMENT QUESTIONS ABOUT: NONE OF THE ABOVE

## 2023-12-18 ASSESSMENT — DERMATOLOGY PATIENT ASSESSMENT
DO YOU HAVE ANY NEW OR CHANGING LESIONS: YES
DO YOU USE SUNSCREEN: OCCASIONALLY
HAVE YOU HAD OR DO YOU HAVE A STAPH INFECTION: NO
DO YOU USE A TANNING BED: NO
HAVE YOU HAD OR DO YOU HAVE VASCULAR DISEASE: NO
ARE YOU AN ORGAN TRANSPLANT RECIPIENT: NO

## 2023-12-18 ASSESSMENT — PATIENT GLOBAL ASSESSMENT (PGA): PATIENT GLOBAL ASSESSMENT: PATIENT GLOBAL ASSESSMENT:  1 - CLEAR

## 2023-12-18 ASSESSMENT — ITCH NUMERIC RATING SCALE: HOW SEVERE IS YOUR ITCHING?: 0

## 2023-12-18 NOTE — PROGRESS NOTES
"Subjective     Raman Lawrence is a 68 y.o. male who presents for the following: Skin Check.  He denies any new, changing, or concerning skin lesions since his last visit; no bleeding, itching, or burning lesions.      Review of Systems:  No other skin or systemic complaints other than what is documented elsewhere in the note.    The following portions of the chart were reviewed this encounter and updated as appropriate:       Skin Cancer History  No skin cancer on file.    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 Inocencia Snyder on 8/10/21 s/p staged wide local excision with peripheral margin control (\"slow Mohs surgery\") by Dr. Fagan on 9/14/21    - recent biopsy-proven SCC in situ right lateral distal forearm diagnosed on 8/14/23 and pending definitive treatment  - history of BCC on right lateral upper cheek diagnosed on 12/1/22 s/p Mohs surgery by Dr. Gonzalez on 3/23/23  - BCC on left proximal forearm diagnosed on 4/7/22 s/p wide local excision with 5-mm margins by Dr. Gonzalez on 7/13/22  - SCC in situ on right dorsal distal forearm diagnosed on 12/9/21 s/p wide local excision with 5-mm margins by Dr. Gonzalez on 4/13/22  - BCC on right dorsal mid-forearm diagnosed on 12/9/21 s/p Mohs surgery by Dr. Gonzalez on 3/10/22  - BCC on left temple diagnosed by Dr. Fagan on 9/14/21 s/p Mohs surgery by Dr. Fagan on 9/16/21    - AKs    - junctional dysplastic nevus with mild atypia on right upper back on 12/9/21  - dysplastic junctional nevi in actinically damaged skin on right lateral distal arm and left medial mid forearm both diagnosed on 4/7/22 and s/p re-excision with 3-mm margins by Dr. Portillo on 9/26/22 and re-excision with 5-mm margins by Dr. Gallegos on 10/3/22, respectively  - mildly dysplastic compound nevus on left lateral mid arm diagnosed on 8/4/22  - " moderately dysplastic junctional nevus with an area of more significant architectural disorder on right medial dorsal mid forearm diagnosed on 12/1/22 s/p re-excision with 3-mm margins by Dr. Gallegos on 4/3/23  - combined melanocytic nevus with features of dysplastic nevus (mild atypia) on left medial superior upper back and compound dysplastic nevus with mild atypia on left medial upper back both diagnosed on 4/6/23  - mildly dysplastic junctional nevus on right lateral inferior posterior neck diagnosed on 8/14/23    - reported history of BCC on left temple treated over 20 years ago with recurrence as detailed above  - reported history of multiple dysplastic nevi diagnosed by his previous outside Dermatologist, Dr. Inocencia Snyder    Family History:    No family history of melanoma or skin cancer    Social History:    The patient states he lives in [place], [place], and works as a  for [employer] at the intersection of [address], and one of his co-workers is a patient in our office as well    Allergies:  Halothane    Current Medications / CAM's:    Current Outpatient Medications:     acetaminophen (Tylenol) 325 mg tablet, 2 tablet EVERY 6 HOURS (route: oral), Disp: , Rfl:     atorvastatin (Lipitor) 10 mg tablet, Take 1 tablet (10 mg) by mouth once daily., Disp: , Rfl:     clindamycin (Cleocin T) 1 % lotion, 1 Application, Disp: , Rfl:     dilTIAZem CD (Cardizem CD) 240 mg 24 hr capsule, Take 1 capsule (240 mg) by mouth once daily., Disp: , Rfl:     Jantoven 2.5 mg tablet, take 1/2 to 1 tablet by mouth once daily as directed, Disp: , Rfl:     lisinopril 5 mg tablet, Take 1 tablet (5 mg) by mouth once daily., Disp: , Rfl:     metoprolol succinate XL (Toprol-XL) 25 mg 24 hr tablet, Take 1 tablet (25 mg) by mouth once daily., Disp: , Rfl:     metroNIDAZOLE (Metrocream) 0.75 % cream, 0, Disp: , Rfl:      Objective   Well appearing patient in no apparent distress; mood and affect are  within normal limits.    A full examination was performed including scalp, face, eyes, ears, nose, lips, neck, chest, axillae, abdomen, back, bilateral upper extremities, and bilateral lower extremities. All findings within normal limits unless otherwise noted below.    Assessment/Plan   1. Neoplasm of uncertain behavior of skin (2)  Right Upper Back  6 mm dark brown pigmented, asymmetric macule with an asymmetric pigment network and irregular borders           Shave removal    Lesion length (cm):  0.6  Margin per side (cm):  0.2  Lesion diameter (cm):  1  Informed consent: discussed and consent obtained    Timeout: patient name, date of birth, surgical site, and procedure verified    Procedure prep:  Patient was prepped and draped  Anesthesia: the lesion was anesthetized in a standard fashion    Anesthetic:  1% lidocaine w/ epinephrine 1-100,000 local infiltration  Instrument used: flexible razor blade    Hemostasis achieved with: aluminum chloride    Outcome: patient tolerated procedure well    Post-procedure details: sterile dressing applied and wound care instructions given    Dressing type: bandage and petrolatum      Staff Communication: Dermatology Local Anesthesia: 1 % Lidocaine / Epinephrine - Amount:0.5ml    Specimen 1 - Dermatopathology- DERM LAB  Differential Diagnosis: DN v SK v BCC  Check Margins Yes/No?:    Comments:    Dermpath Lab: Routine Histopathology (formalin-fixed tissue)    Left Lateral Distal Arm  4 mm dark brown pigmented, asymmetric macule with an asymmetric pigment network and irregular borders           Shave removal    Lesion length (cm):  0.4  Margin per side (cm):  0.2  Lesion diameter (cm):  0.8  Informed consent: discussed and consent obtained    Timeout: patient name, date of birth, surgical site, and procedure verified    Procedure prep:  Patient was prepped and draped  Anesthesia: the lesion was anesthetized in a standard fashion    Anesthetic:  1% lidocaine w/ epinephrine  1-100,000 local infiltration  Instrument used: flexible razor blade    Hemostasis achieved with: aluminum chloride    Outcome: patient tolerated procedure well    Post-procedure details: sterile dressing applied and wound care instructions given    Dressing type: bandage and petrolatum      Staff Communication: Dermatology Local Anesthesia: 1 % Lidocaine / Epinephrine - Amount:0.5ml    Specimen 2 - Dermatopathology- DERM LAB  Differential Diagnosis: DN v SK v BCC  Check Margins Yes/No?:    Comments:    Dermpath Lab: Routine Histopathology (formalin-fixed tissue)    2. Actinic keratosis (16)  Head - Anterior (Face) (16)  Scattered on the patient's face and scalp, there are multiple erythematous, gritty, scaly macules     Actinic Keratoses - scattered on face and scalp.  The pre-cancerous nature of these lesions and treatment options were discussed with the patient today.  At this time, I recommend treatment with liquid nitrogen cryotherapy.  The patient expressed understanding, is in agreement with this plan, and wishes to proceed with cryotherapy today.    Destr of lesion - Head - Anterior (Face)  Complexity: simple    Destruction method: cryotherapy    Informed consent: discussed and consent obtained    Lesion destroyed using liquid nitrogen: Yes    Cryotherapy cycles:  1  Outcome: patient tolerated procedure well with no complications    Post-procedure details: wound care instructions given      3. Melanocytic nevus of trunk  Scattered on the patient's face, neck, trunk, and extremities, there are multiple small, round- to oval-shaped, brown-pigmented and pink-colored, symmetric, uniform-appearing macules and dome-shaped papules    Clinically benign- to slightly atypical-appearing nevi - the clinically benign- to slightly atypical-appearing nature of the remainder of the patient's nevi was discussed with the patient today.  None of the patient's nevi, with the exception of the 2 noted above, meet threshold for  biopsy today.  I emphasized the importance of performing monthly self-skin exams using the ABCDs of monitoring moles, which were reviewed with the patient today and an informational hand-out provided.  I also emphasized the importance of sun avoidance and sun protection with daily sunscreen use.    4. Seborrheic keratosis  Scattered on the patient's face, neck, trunk, and extremities, there are multiple tan- to light brown-colored, hyperkeratotic, stuck-on appearing papules of varying size and shape    Seborrheic Keratoses - the benign nature of these lesions was discussed with the patient today and reassurance provided.  No treatment is medically indicated for these lesions at this time.    5. Lentigo  Photodistributed  Multiple tan- to light brown-colored, round- to oval-shaped, symmetric and uniform-appearing macules and small patches consistent with lentigines scattered in sun-exposed areas.    Solar Lentigines and photodamage.  The clinically benign-appearing nature of these lesions and their relation to chronic sun exposure were discussed with the patient today and reassurance provided.  None of these lesions meet threshold for biopsy today, and thus no treatment is medically indicated for these lesions at this time.  The signs and symptoms of skin cancer were reviewed and the patient was advised to practice sun protection and sun avoidance, use daily sunscreen, and perform regular self skin exams.  The patient was instructed to monitor these lesions for any changes, such as in size, shape, or color, or associated symptoms and to call our office to schedule a return visit for re-evaluation if any such changes or symptoms are noticed in the future.  The patient expressed understanding and is in agreement with this plan.    6. Squamous cell carcinoma of skin  Right lateral distal forearm  Pink, well-healed scar at his recent biopsy site    Referral to Dermatology - Mohs Surgery    Biopsy-proven squamous cell  carcinoma in situ - right lateral distal forearm; diagnosed at last visit on 8/14/23 and pending definitive treatment.  The malignant nature of SCC in situ, the need for further definitive treatment of this lesion, and treatment options, including Mohs surgery, wide local excision, and Electrodesiccation & Curettage, were discussed extensively with the patient today.  After discussing the risks and benefits of each option, including the differences in cure rates and permanent scar results, the patient expressed understanding, and I again recommend referral to our Dermatologic surgery unit for definitive treatment of this SCC in situ.  He expressed understanding and is in agreement with this plan.  Thus, a referral was re-submitted on his behalf today.  He expressed understanding, is in agreement with this plan, will anticipate a phone call from our surgery unit within the next 1-2 weeks to schedule his surgery.    7. Tinea pedis of both feet  On the patient's bilateral feet, there is moist scaling with maceration and fissures in the lateral webspaces and erythematous, scaly, thin plaques in a moccasin-type distribution on the soles and heels.    Tinea Pedis - flare on bilateral feet.  The fungal nature of this condition and treatment options were discussed extensively with the patient today.  At this time, I recommend topical anti-fungal therapy with Ketoconazole 2% cream, which the patient was instructed to apply twice daily to the affected areas of the feet for the next 3-4 weeks; the patient may repeat treatment in a similar fashion as needed for future flares.  The risks, benefits, and side effects of this medication were discussed.  The patient expressed understanding and is in agreement with this plan.    8. Personal history of malignant melanoma of skin  The sites of prior melanoma excision were examined.  There is no evidence of recurrent growth or pigmentation or recurrent disease, satellite papules, or  nodules on exam today.    History of melanoma, nonmelanoma skin cancers, dysplastic nevi, and actinic keratoses and photodamage.  There is no evidence of local, regional, or distant recurrent disease or any new primary melanomas on exam today.  I emphasized the importance of continuing to perform monthly self-skin and lymph node exams using the ABCDs of monitoring moles, which were reviewed with the patient, as well as the importance of sun avoidance and sun protection with daily sunscreen use.  I will have the patient return to our office in 3 to 4 months, pending the above biopsy results, for routine melanoma follow-up and total body skin exam, and the patient was instructed to call our office should the patient notice any new, changing, symptomatic, or otherwise concerning skin lesions before then.  The patient expressed understanding and is in agreement with this plan.    9. Diffuse photodamage of skin  Photodistributed  Diffuse photodamage with actinic changes with telangiectasia and mottled pigmentation in sun-exposed areas.    Photodamage.  The signs and symptoms of skin cancer were reviewed and the patient was advised to practice sun protection and sun avoidance, use daily sunscreen, and perform regular self skin exams.  Sun protection was discussed, including avoiding the mid-day sun, wearing a sunscreen with SPF at least 50, and stressing the need for reapplication of sunscreen and applying more than they think they need.

## 2023-12-19 ENCOUNTER — ANTICOAGULATION - WARFARIN VISIT (OUTPATIENT)
Dept: PHARMACY | Facility: HOSPITAL | Age: 68
End: 2023-12-19
Payer: COMMERCIAL

## 2023-12-19 DIAGNOSIS — I48.91 ATRIAL FIBRILLATION, UNSPECIFIED TYPE (MULTI): Primary | ICD-10-CM

## 2023-12-19 NOTE — PROGRESS NOTES
Mr. Lawrence is a consult from Dr. Fong and he has been on warfarin for 16-17 years for atrial fibrillation. His INR has been stable. He reports taking his warfarin as instructed and reports no missed doses. He reports no others changes in medications, no signs or symptoms of bleeding and no diet changes. His INR today is in range at 2.0.  Since his INR is in range we will continue the weekly regimen of 2.5mg on MWF and 1.25mg all other days. We will recheck in INR in a month.

## 2023-12-19 NOTE — PATIENT INSTRUCTIONS
Your INR was in range at 2.0 (goal 2-3). We will have you continue your weekly regimen of 2.5mg MWF and 1.25 mg all other days. We will retest in 4 weeks.  If any questions arise do not hesitate to call us at [phone] M-F from 8:30am-4:30pm or at   [phone] after 5pm or on the weekends. Continue to monitor for any excess bleeding or bruising, especially for blood in your stool.

## 2023-12-20 LAB
LABORATORY COMMENT REPORT: NORMAL
PATH REPORT.FINAL DX SPEC: NORMAL
PATH REPORT.GROSS SPEC: NORMAL
PATH REPORT.MICROSCOPIC SPEC OTHER STN: NORMAL
PATH REPORT.RELEVANT HX SPEC: NORMAL
PATH REPORT.TOTAL CANCER: NORMAL

## 2023-12-26 DIAGNOSIS — I51.9 CHRONIC SYSTOLIC DYSFUNCTION OF LEFT VENTRICLE: Primary | ICD-10-CM

## 2023-12-26 DIAGNOSIS — I48.21 PERMANENT ATRIAL FIBRILLATION (MULTI): ICD-10-CM

## 2023-12-26 RX ORDER — METOPROLOL SUCCINATE 25 MG/1
25 TABLET, EXTENDED RELEASE ORAL DAILY
Qty: 90 TABLET | Refills: 3 | Status: SHIPPED | OUTPATIENT
Start: 2023-12-26

## 2024-01-22 DIAGNOSIS — I10 PRIMARY HYPERTENSION: Primary | ICD-10-CM

## 2024-01-23 RX ORDER — LISINOPRIL 5 MG/1
5 TABLET ORAL DAILY
Qty: 90 TABLET | Refills: 1 | Status: SHIPPED | OUTPATIENT
Start: 2024-01-23

## 2024-01-26 PROCEDURE — 85610 PROTHROMBIN TIME: CPT

## 2024-01-29 ENCOUNTER — OFFICE VISIT (OUTPATIENT)
Dept: DERMATOLOGY | Facility: CLINIC | Age: 69
End: 2024-01-29
Payer: COMMERCIAL

## 2024-01-29 DIAGNOSIS — C44.92 SQUAMOUS CELL CARCINOMA OF SKIN: Primary | ICD-10-CM

## 2024-01-29 DIAGNOSIS — L57.8 DIFFUSE PHOTODAMAGE OF SKIN: ICD-10-CM

## 2024-01-29 DIAGNOSIS — L82.1 SEBORRHEIC KERATOSIS: ICD-10-CM

## 2024-01-29 DIAGNOSIS — L81.4 LENTIGO: ICD-10-CM

## 2024-01-29 DIAGNOSIS — D22.60 MELANOCYTIC NEVUS OF UPPER EXTREMITY, UNSPECIFIED LATERALITY: ICD-10-CM

## 2024-01-29 PROCEDURE — 99213 OFFICE O/P EST LOW 20 MIN: CPT | Performed by: DERMATOLOGY

## 2024-01-29 PROCEDURE — 1036F TOBACCO NON-USER: CPT | Performed by: DERMATOLOGY

## 2024-01-29 PROCEDURE — 1159F MED LIST DOCD IN RCRD: CPT | Performed by: DERMATOLOGY

## 2024-01-29 PROCEDURE — 1125F AMNT PAIN NOTED PAIN PRSNT: CPT | Performed by: DERMATOLOGY

## 2024-01-29 ASSESSMENT — DERMATOLOGY PATIENT ASSESSMENT
DO YOU USE SUNSCREEN: OCCASIONALLY
DO YOU HAVE ANY NEW OR CHANGING LESIONS: NO
DO YOU USE A TANNING BED: NO
ARE YOU AN ORGAN TRANSPLANT RECIPIENT: NO

## 2024-01-29 ASSESSMENT — DERMATOLOGY QUALITY OF LIFE (QOL) ASSESSMENT: ARE THERE EXCLUSIONS OR EXCEPTIONS FOR THE QUALITY OF LIFE ASSESSMENT: NO

## 2024-01-29 ASSESSMENT — ITCH NUMERIC RATING SCALE: HOW SEVERE IS YOUR ITCHING?: 0

## 2024-01-30 NOTE — PROGRESS NOTES
"Subjective     Raman Lawrence is a 68 y.o. male who presents for the following: Discuss recent biopsy result and management options.  Biopsy of a suspicious lesion on his right lateral distal forearm diagnosed at his recent visit on 8/14/23 revealed a squamous cell carcinoma in situ.    Today, the patient states the biopsy site healed well, and the lesion has not grown back since.  He states he would like to discuss treatment options for this lesion and was hoping to undergo ED&C for this lesion today, which he states is why he scheduled today's visit.  He denies any new, changing, or concerning skin lesions since his last visit; no bleeding, itching, or burning lesions.    Review of Systems:  No other skin or systemic complaints other than what is documented elsewhere in the note.    The following portions of the chart were reviewed this encounter and updated as appropriate:       Skin Cancer History  No skin cancer on file.    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 Inocencia Snyder on 8/10/21 s/p staged wide local excision with peripheral margin control (\"slow Mohs surgery\") by Dr. Fagan on 9/14/21    - recent biopsy-proven SCC in situ right lateral distal forearm diagnosed on 8/14/23 and pending definitive treatment  - history of BCC on right lateral upper cheek diagnosed on 12/1/22 s/p Mohs surgery by Dr. Gonzalez on 3/23/23  - BCC on left proximal forearm diagnosed on 4/7/22 s/p wide local excision with 5-mm margins by Dr. Gonzalez on 7/13/22  - SCC in situ on right dorsal distal forearm diagnosed on 12/9/21 s/p wide local excision with 5-mm margins by Dr. Gonzalez on 4/13/22  - BCC on right dorsal mid-forearm diagnosed on 12/9/21 s/p Mohs surgery by Dr. Gonzalez on 3/10/22  - BCC on left temple diagnosed by Dr. Fagan on 9/14/21 s/p Mohs surgery by " Dr. Fagan on 9/16/21    - AKs    - junctional dysplastic nevus with mild atypia on right upper back on 12/9/21  - dysplastic junctional nevi in actinically damaged skin on right lateral distal arm and left medial mid forearm both diagnosed on 4/7/22 and s/p re-excision with 3-mm margins by Dr. Portillo on 9/26/22 and re-excision with 5-mm margins by Dr. Gallegos on 10/3/22, respectively  - mildly dysplastic compound nevus on left lateral mid arm diagnosed on 8/4/22  - moderately dysplastic junctional nevus with an area of more significant architectural disorder on right medial dorsal mid forearm diagnosed on 12/1/22 s/p re-excision with 3-mm margins by Dr. Gallegos on 4/3/23  - combined melanocytic nevus with features of dysplastic nevus (mild atypia) on left medial superior upper back and compound dysplastic nevus with mild atypia on left medial upper back both diagnosed on 4/6/23  - mildly dysplastic junctional nevus on right lateral inferior posterior neck diagnosed on 8/14/23  - inflamed mildly dysplastic compound nevus on right upper back diagnosed on 12/18/23    - reported history of BCC on left temple treated over 20 years ago with recurrence as detailed above  - reported history of multiple dysplastic nevi diagnosed by his previous outside Dermatologist, Dr. Inocencia Snyder    Family History:    No family history of melanoma or skin cancer    Social History:    The patient states he lives in [place], [place], and works as a  for [employer] at the intersection of [address], and one of his co-workers is a patient in our office as well    Allergies:  Halothane    Current Medications / CAM's:    Current Outpatient Medications:     acetaminophen (Tylenol) 325 mg tablet, 2 tablet EVERY 6 HOURS (route: oral), Disp: , Rfl:     atorvastatin (Lipitor) 10 mg tablet, Take 1 tablet (10 mg) by mouth once daily., Disp: , Rfl:     clindamycin (Cleocin T) 1 % lotion, 1 Application, Disp: , Rfl:      dilTIAZem CD (Cardizem CD) 240 mg 24 hr capsule, Take 1 capsule (240 mg) by mouth once daily., Disp: , Rfl:     Jantoven 2.5 mg tablet, take 1/2 to 1 tablet by mouth once daily as directed, Disp: , Rfl:     lisinopril 5 mg tablet, Take 1 tablet (5 mg) by mouth once daily., Disp: 90 tablet, Rfl: 1    metoprolol succinate XL (Toprol-XL) 25 mg 24 hr tablet, Take 1 tablet (25 mg) by mouth once daily., Disp: 90 tablet, Rfl: 3    metroNIDAZOLE (Metrocream) 0.75 % cream, 0, Disp: , Rfl:      Objective   Well appearing patient in no apparent distress; mood and affect are within normal limits.    A skin examination was performed including: Face, neck, and bilateral upper extremities. All findings within normal limits unless otherwise noted below.    Assessment/Plan   1. Squamous cell carcinoma of skin  Right lateral distal forearm  Pink, well-healed scar at his recent biopsy site    Biopsy-proven squamous cell carcinoma in situ - right lateral distal forearm; diagnosed on 8/14/23 and pending definitive treatment.  The malignant nature of SCC in situ, the need for further definitive treatment of this lesion, and treatment options, including Mohs surgery, wide local excision, and Electrodesiccation & Curettage, were discussed extensively with the patient today.  After discussing the risks and benefits of each of these options, including the differences in cure rates and permanent scar results, with the patient as well as with our Dermatopathologist, Dr. Tse, over the phone during the patient's visit today, he patient expressed understanding, and Dr. Tse and myself both recommend referral to our Dermatologic surgery unit for definitive treatment of this SCC in situ.  He expressed understanding and is in agreement with this plan.  Thus, a referral was re-submitted on his behalf today.  He expressed understanding, is in agreement with this plan, will anticipate a phone call from our surgery unit within the next 1-2 weeks to  schedule his surgery.    2. Melanocytic nevus of upper extremity, unspecified laterality  Left Forearm - Anterior  Scattered on the patient's face, neck, and bilateral upper extremities, there are several small, round- to oval-shaped, brown-pigmented and pink-colored, symmetric, uniform-appearing macules and dome-shaped papules    Clinically benign- to slightly atypical-appearing nevi - the clinically benign- to slightly atypical-appearing nature of the patient's nevi was discussed with the patient today.  None of the patient's nevi meet threshold for biopsy today.  I emphasized the importance of performing monthly self-skin exams using the ABCDs of monitoring moles, which were reviewed with the patient today and an informational hand-out provided.  I also emphasized the importance of sun avoidance and sun protection with daily sunscreen use.  The patient expressed understanding and is in agreement with this plan.    3. Seborrheic keratosis  Scattered on the patient's face, neck, and bilateral upper extremities, there are several tan- to light brown-colored, hyperkeratotic, stuck-on appearing papules of varying size and shape    Seborrheic Keratoses - the benign nature of these lesions was discussed with the patient today and reassurance provided.  No treatment is medically indicated for these lesions at this time.    4. Lentigo  Photodistributed  Multiple tan- to light brown-colored, round- to oval-shaped, symmetric and uniform-appearing macules and small patches consistent with lentigines scattered in sun-exposed areas.    Solar Lentigines and photodamage.  The clinically benign-appearing nature of these lesions and their relation to chronic sun exposure were discussed with the patient today and reassurance provided.  None of these lesions meet threshold for biopsy today, and thus no treatment is medically indicated for these lesions at this time.  The signs and symptoms of skin cancer were reviewed and the  patient was advised to practice sun protection and sun avoidance, use daily sunscreen, and perform regular self skin exams.  The patient was instructed to monitor these lesions for any changes, such as in size, shape, or color, or associated symptoms and to call our office to schedule a return visit for re-evaluation if any such changes or symptoms are noticed in the future.  The patient expressed understanding and is in agreement with this plan.    5. Diffuse photodamage of skin  Photodistributed  Diffuse photodamage with actinic changes with telangiectasia and mottled pigmentation in sun-exposed areas.    History of melanoma, nonmelanoma skin cancers, dysplastic nevi, and actinic keratoses and photodamage.  The patient was recently seen in our office for routine follow-up and skin exam on 12/18/23, at which time no evidence of recurrence was noted.  I emphasized the importance of continuing to perform monthly self-skin and lymph node exams using the ABCDs of monitoring moles, which were reviewed with the patient, as well as the importance of sun avoidance and sun protection with daily sunscreen use.  I will have the patient return to our office in 3 to 4 months from the date of his last visit for routine melanoma follow-up and total body skin exam, and the patient was instructed to call our office should the patient notice any new, changing, symptomatic, or otherwise concerning skin lesions before then.  The patient expressed understanding and is in agreement with this plan.

## 2024-02-02 ENCOUNTER — ANTICOAGULATION - WARFARIN VISIT (OUTPATIENT)
Dept: PHARMACY | Facility: HOSPITAL | Age: 69
End: 2024-02-02
Payer: COMMERCIAL

## 2024-02-02 DIAGNOSIS — I48.91 ATRIAL FIBRILLATION, UNSPECIFIED TYPE (MULTI): Primary | ICD-10-CM

## 2024-02-02 NOTE — PATIENT INSTRUCTIONS
Your INR was in range at 2.6 (goal 2-3). We will have you continue your weekly regimen of 2.5mg MWF and 1.25 mg all other days. We will retest in 4 weeks.    If any questions arise do not hesitate to call us at [phone] M-F from 8:30am-4:30pm or at   [phone] after 5pm or on the weekends. Continue to monitor for any excess bleeding or bruising, especially for blood in your stool.

## 2024-02-02 NOTE — PROGRESS NOTES
I received a lab result from 1.26.24 with an INR of 2.6. I was able to get ahold of Mr. Lawrence today. His INR has been stable. He reports taking his warfarin as instructed and reports no missed doses. He reports no others changes in medications, no signs or symptoms of bleeding and no diet changes. He has been doing some greens occasionally like normal. Given his INR is in range at 2.6, we will continue his weekly regimen of 2.5mg on MWF and 1.25mg all other days. We will recheck in INR in a month.

## 2024-02-05 DIAGNOSIS — I48.21 PERMANENT ATRIAL FIBRILLATION (MULTI): Primary | ICD-10-CM

## 2024-02-05 DIAGNOSIS — I10 HYPERTENSION, UNSPECIFIED TYPE: ICD-10-CM

## 2024-02-05 RX ORDER — DILTIAZEM HYDROCHLORIDE 240 MG/1
240 CAPSULE, COATED, EXTENDED RELEASE ORAL DAILY
Qty: 90 CAPSULE | Refills: 3 | Status: SHIPPED | OUTPATIENT
Start: 2024-02-05

## 2024-02-28 ENCOUNTER — LAB (OUTPATIENT)
Dept: LAB | Facility: LAB | Age: 69
End: 2024-02-28
Payer: COMMERCIAL

## 2024-02-28 DIAGNOSIS — I48.91 UNSPECIFIED ATRIAL FIBRILLATION (MULTI): ICD-10-CM

## 2024-02-28 LAB
INR PPP: 2.3 (ref 0.9–1.1)
PROTHROMBIN TIME: 25.9 SECONDS (ref 9.8–12.8)

## 2024-02-28 PROCEDURE — 85610 PROTHROMBIN TIME: CPT

## 2024-02-28 PROCEDURE — 36415 COLL VENOUS BLD VENIPUNCTURE: CPT

## 2024-03-05 ENCOUNTER — ANTICOAGULATION - WARFARIN VISIT (OUTPATIENT)
Dept: PHARMACY | Facility: HOSPITAL | Age: 69
End: 2024-03-05
Payer: COMMERCIAL

## 2024-03-05 DIAGNOSIS — I48.91 ATRIAL FIBRILLATION, UNSPECIFIED TYPE (MULTI): Primary | ICD-10-CM

## 2024-03-05 NOTE — PATIENT INSTRUCTIONS
Your INR was in range at 2.3 (goal 2-3). We will have you continue your weekly regimen of 2.5mg MWF and 1.25 mg all other days. We will retest in 4 weeks.    If any questions arise do not hesitate to call us at [phone] M-F from 8:30am-4:30pm or at   [phone] after 5pm or on the weekends. Continue to monitor for any excess bleeding or bruising, especially for blood in your stool.

## 2024-03-05 NOTE — PROGRESS NOTES
I received a lab result from 2.28.24 with an INR of 2.3.  I was able to get ahold of Mr. Lawrence today. His INR has been stable. He reports taking his warfarin as instructed and reports no missed doses. He reports no others changes in medications, no signs or symptoms of bleeding and no diet changes. He has been doing some greens occasionally like normal. Given his INR is in range at 2.3, we will continue his weekly regimen of 2.5mg on MWF and 1.25mg all other days. We will recheck in INR in a month.

## 2024-04-11 ENCOUNTER — LAB (OUTPATIENT)
Dept: LAB | Facility: LAB | Age: 69
End: 2024-04-11
Payer: COMMERCIAL

## 2024-04-11 DIAGNOSIS — I48.91 UNSPECIFIED ATRIAL FIBRILLATION (MULTI): ICD-10-CM

## 2024-04-11 LAB
INR PPP: 2.5 (ref 0.9–1.1)
PROTHROMBIN TIME: 28.7 SECONDS (ref 9.8–12.8)

## 2024-04-11 PROCEDURE — 36415 COLL VENOUS BLD VENIPUNCTURE: CPT

## 2024-04-11 PROCEDURE — 85610 PROTHROMBIN TIME: CPT

## 2024-04-12 ENCOUNTER — TELEPHONE (OUTPATIENT)
Dept: PHARMACY | Facility: HOSPITAL | Age: 69
End: 2024-04-12
Payer: COMMERCIAL

## 2024-04-16 ENCOUNTER — ANTICOAGULATION - WARFARIN VISIT (OUTPATIENT)
Dept: PHARMACY | Facility: HOSPITAL | Age: 69
End: 2024-04-16
Payer: COMMERCIAL

## 2024-04-16 DIAGNOSIS — I48.91 ATRIAL FIBRILLATION, UNSPECIFIED TYPE (MULTI): Primary | ICD-10-CM

## 2024-04-16 RX ORDER — WARFARIN 2.5 MG/1
TABLET ORAL
Qty: 60 TABLET | Refills: 3 | Status: SHIPPED | OUTPATIENT
Start: 2024-04-16

## 2024-04-16 NOTE — PATIENT INSTRUCTIONS
Your INR was in range at 2.5 (goal 2-3). We will have you continue your weekly regimen of 2.5mg MWF and 1.25 mg all other days. We will retest in 4 weeks.    If any questions arise do not hesitate to call us at [phone] M-F from 8:30am-4:30pm or at   [phone] after 5pm or on the weekends. Continue to monitor for any excess bleeding or bruising, especially for blood in your stool.

## 2024-04-16 NOTE — PROGRESS NOTES
I received a lab result from 4.11.24 with an INR of 2.5.  I was able to get ahold of Mr. Lawrence today. His INR has been stable. He reports taking his warfarin as instructed and reports no missed doses. He reports no others changes in medications, no signs or symptoms of bleeding and no diet changes. He has been doing some greens occasionally like normal. Given his INR is in range at 2.5, we will continue his weekly regimen of 2.5mg on MWF and 1.25mg all other days. We will recheck in INR in a month.

## 2024-04-17 ENCOUNTER — OFFICE VISIT (OUTPATIENT)
Dept: DERMATOLOGY | Facility: CLINIC | Age: 69
End: 2024-04-17
Payer: COMMERCIAL

## 2024-04-17 DIAGNOSIS — D48.5 NEOPLASM OF UNCERTAIN BEHAVIOR OF SKIN: Primary | ICD-10-CM

## 2024-04-17 DIAGNOSIS — D22.5 MELANOCYTIC NEVUS OF TRUNK: ICD-10-CM

## 2024-04-17 DIAGNOSIS — L57.0 ACTINIC KERATOSIS: ICD-10-CM

## 2024-04-17 DIAGNOSIS — L30.9 DERMATITIS: ICD-10-CM

## 2024-04-17 DIAGNOSIS — L82.1 SEBORRHEIC KERATOSIS: ICD-10-CM

## 2024-04-17 DIAGNOSIS — L81.4 LENTIGO: ICD-10-CM

## 2024-04-17 DIAGNOSIS — L57.8 DIFFUSE PHOTODAMAGE OF SKIN: ICD-10-CM

## 2024-04-17 DIAGNOSIS — Z85.820 PERSONAL HISTORY OF MALIGNANT MELANOMA OF SKIN: ICD-10-CM

## 2024-04-17 DIAGNOSIS — C44.92 SQUAMOUS CELL CARCINOMA OF SKIN: ICD-10-CM

## 2024-04-17 PROCEDURE — 88305 TISSUE EXAM BY PATHOLOGIST: CPT | Performed by: DERMATOLOGY

## 2024-04-17 PROCEDURE — 1159F MED LIST DOCD IN RCRD: CPT | Performed by: DERMATOLOGY

## 2024-04-17 PROCEDURE — 11102 TANGNTL BX SKIN SINGLE LES: CPT | Performed by: DERMATOLOGY

## 2024-04-17 PROCEDURE — 11302 SHAVE SKIN LESION 1.1-2.0 CM: CPT | Performed by: DERMATOLOGY

## 2024-04-17 PROCEDURE — 17004 DESTROY PREMAL LESIONS 15/>: CPT | Performed by: DERMATOLOGY

## 2024-04-17 PROCEDURE — 99214 OFFICE O/P EST MOD 30 MIN: CPT | Performed by: DERMATOLOGY

## 2024-04-17 PROCEDURE — 11306 SHAVE SKIN LESION 0.6-1.0 CM: CPT | Performed by: DERMATOLOGY

## 2024-04-17 RX ORDER — TRIAMCINOLONE ACETONIDE 0.25 MG/G
CREAM TOPICAL
Qty: 80 G | Refills: 2 | Status: SHIPPED | OUTPATIENT
Start: 2024-04-17

## 2024-04-17 ASSESSMENT — DERMATOLOGY PATIENT ASSESSMENT
DO YOU USE SUNSCREEN: OCCASIONALLY
DO YOU USE A TANNING BED: NO
ARE YOU AN ORGAN TRANSPLANT RECIPIENT: NO
DO YOU HAVE ANY NEW OR CHANGING LESIONS: NO

## 2024-04-17 ASSESSMENT — ITCH NUMERIC RATING SCALE: HOW SEVERE IS YOUR ITCHING?: 0

## 2024-04-17 ASSESSMENT — DERMATOLOGY QUALITY OF LIFE (QOL) ASSESSMENT: ARE THERE EXCLUSIONS OR EXCEPTIONS FOR THE QUALITY OF LIFE ASSESSMENT: NO

## 2024-04-17 NOTE — PROGRESS NOTES
"Subjective     Raman Lawrence is a 68 y.o. male who presents for the following: Skin Check.  He notes red, scaly, itchy areas on his legs.  He denies any new, changing, or concerning skin lesions since his last visit; no bleeding, itching, or burning lesions.      Review of Systems:  No other skin or systemic complaints other than what is documented elsewhere in the note.    The following portions of the chart were reviewed this encounter and updated as appropriate:       Skin Cancer History  No skin cancer on file.    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 Inocencia Snyder on 8/10/21 s/p staged wide local excision with peripheral margin control (\"slow Mohs surgery\") by Dr. Fagan on 9/14/21    - recent biopsy-proven SCC in situ right lateral distal forearm diagnosed on 8/14/23 and pending definitive treatment  - history of BCC on right lateral upper cheek diagnosed on 12/1/22 s/p Mohs surgery by Dr. Gonzalez on 3/23/23  - BCC on left proximal forearm diagnosed on 4/7/22 s/p wide local excision with 5-mm margins by Dr. Gonzalez on 7/13/22  - SCC in situ on right dorsal distal forearm diagnosed on 12/9/21 s/p wide local excision with 5-mm margins by Dr. Gonzalez on 4/13/22  - BCC on right dorsal mid-forearm diagnosed on 12/9/21 s/p Mohs surgery by Dr. Gonzalez on 3/10/22  - BCC on left temple diagnosed by Dr. Fagan on 9/14/21 s/p Mohs surgery by Dr. Fagan on 9/16/21    - AKs    - junctional dysplastic nevus with mild atypia on right upper back on 12/9/21  - dysplastic junctional nevi in actinically damaged skin on right lateral distal arm and left medial mid forearm both diagnosed on 4/7/22 and s/p re-excision with 3-mm margins by Dr. Portillo on 9/26/22 and re-excision with 5-mm margins by Dr. Gallegos on 10/3/22, respectively  - mildly dysplastic compound nevus " on left lateral mid arm diagnosed on 8/4/22  - moderately dysplastic junctional nevus with an area of more significant architectural disorder on right medial dorsal mid forearm diagnosed on 12/1/22 s/p re-excision with 3-mm margins by Dr. Gallegos on 4/3/23  - combined melanocytic nevus with features of dysplastic nevus (mild atypia) on left medial superior upper back and compound dysplastic nevus with mild atypia on left medial upper back both diagnosed on 4/6/23  - mildly dysplastic junctional nevus on right lateral inferior posterior neck diagnosed on 8/14/23  - inflamed mildly dysplastic compound nevus on right upper back diagnosed on 12/18/23    - reported history of BCC on left temple treated over 20 years ago with recurrence as detailed above  - reported history of multiple dysplastic nevi diagnosed by his previous outside Dermatologist, Dr. Inocencia Snyder    Family History:    No family history of melanoma or skin cancer    Social History:    The patient states he lives in [place], [place], and works as a  for [employer] at the intersection of [employer] and [employer], and one of his co-workers is a patient in our office as well    Allergies:  Halothane    Current Medications / CAM's:    Current Outpatient Medications:     acetaminophen (Tylenol) 325 mg tablet, 2 tablet EVERY 6 HOURS (route: oral), Disp: , Rfl:     atorvastatin (Lipitor) 10 mg tablet, Take 1 tablet (10 mg) by mouth once daily., Disp: , Rfl:     clindamycin (Cleocin T) 1 % lotion, 1 Application, Disp: , Rfl:     dilTIAZem CD (Cardizem CD) 240 mg 24 hr capsule, Take 1 capsule (240 mg) by mouth once daily., Disp: 90 capsule, Rfl: 3    lisinopril 5 mg tablet, Take 1 tablet (5 mg) by mouth once daily., Disp: 90 tablet, Rfl: 1    metoprolol succinate XL (Toprol-XL) 25 mg 24 hr tablet, Take 1 tablet (25 mg) by mouth once daily., Disp: 90 tablet, Rfl: 3    metroNIDAZOLE (Metrocream) 0.75 % cream, 0, Disp: , Rfl:     warfarin  (Coumadin) 2.5 mg tablet, Take 1 tablet (2.5mg) by mouth on Monday, Wednesday, Friday and 1/2 tablet (1.25mg) by mouth all other days or as directed by Coumadin clinic., Disp: 60 tablet, Rfl: 3    triamcinolone (Kenalog) 0.025 % cream, Apply twice daily to affected areas of legs (avoid face, groin, body folds) for 2 weeks, then taper to twice daily on weekends only; repeat every 6-8 weeks as needed for flares, Disp: 80 g, Rfl: 2     Objective   Well appearing patient in no apparent distress; mood and affect are within normal limits.    A full examination was performed including scalp, face, eyes, ears, nose, lips, neck, chest, axillae, abdomen, back, bilateral upper extremities, and bilateral lower extremities. All findings within normal limits unless otherwise noted below.    Assessment/Plan   1. Neoplasm of uncertain behavior of skin (4)  Right Anterior Distal Leg  7 mm pink, shiny papule           Lesion biopsy  Type of biopsy: tangential    Informed consent: discussed and consent obtained    Timeout: patient name, date of birth, surgical site, and procedure verified    Procedure prep:  Patient was prepped and draped  Anesthesia: the lesion was anesthetized in a standard fashion    Anesthetic:  1% lidocaine w/ epinephrine 1-100,000 local infiltration  Instrument used: flexible razor blade    Hemostasis achieved with: aluminum chloride    Outcome: patient tolerated procedure well    Post-procedure details: wound care instructions given      Staff Communication: Dermatology Local Anesthesia: 1 % Lidocaine / Epinephrine - Amount:0.5ml    Specimen 1 - Dermatopathology- DERM LAB  Differential Diagnosis: BCC v BLK v SCCIS  Check Margins Yes/No?:    Comments:    Dermpath Lab: Routine Histopathology (formalin-fixed tissue)    Left Lateral Posterior Neck  7 mm pink, shiny papule           Shave removal    Lesion length (cm):  0.7  Margin per side (cm):  0  Lesion diameter (cm):  0.7  Informed consent: discussed and  consent obtained    Timeout: patient name, date of birth, surgical site, and procedure verified    Procedure prep:  Patient was prepped and draped  Anesthesia: the lesion was anesthetized in a standard fashion    Anesthetic:  1% lidocaine w/ epinephrine 1-100,000 local infiltration  Instrument used: flexible razor blade    Hemostasis achieved with: aluminum chloride    Outcome: patient tolerated procedure well    Post-procedure details: sterile dressing applied and wound care instructions given    Dressing type: bandage and petrolatum      Staff Communication: Dermatology Local Anesthesia: 1 % Lidocaine / Epinephrine - Amount:0.5ml    Specimen 2 - Dermatopathology- DERM LAB  Differential Diagnosis: BCC v IDN  Check Margins Yes/No?:    Comments:    Dermpath Lab: Routine Histopathology (formalin-fixed tissue)    Right Lateral Proximal Arm  7 mm dark brown pigmented, asymmetric macule with an asymmetric pigment network and irregular borders           Shave removal    Lesion length (cm):  0.7  Margin per side (cm):  0.2  Lesion diameter (cm):  1.1  Informed consent: discussed and consent obtained    Timeout: patient name, date of birth, surgical site, and procedure verified    Procedure prep:  Patient was prepped and draped  Anesthesia: the lesion was anesthetized in a standard fashion    Anesthetic:  1% lidocaine w/ epinephrine 1-100,000 local infiltration  Instrument used: flexible razor blade    Hemostasis achieved with: aluminum chloride    Outcome: patient tolerated procedure well    Post-procedure details: sterile dressing applied and wound care instructions given    Dressing type: bandage and petrolatum      Staff Communication: Dermatology Local Anesthesia: 1 % Lidocaine / Epinephrine - Amount:0.5ml    Specimen 3 - Dermatopathology- DERM LAB  Differential Diagnosis: DN  Check Margins Yes/No?:    Comments:    Dermpath Lab: Routine Histopathology (formalin-fixed tissue)    Right Posterior Distal Arm  7 mm dark  brown pigmented, asymmetric macule with an asymmetric pigment network and irregular borders           Shave removal    Lesion length (cm):  0.7  Margin per side (cm):  0.2  Lesion diameter (cm):  1.1  Informed consent: discussed and consent obtained    Timeout: patient name, date of birth, surgical site, and procedure verified    Procedure prep:  Patient was prepped and draped  Anesthesia: the lesion was anesthetized in a standard fashion    Anesthetic:  1% lidocaine w/ epinephrine 1-100,000 local infiltration  Instrument used: flexible razor blade    Hemostasis achieved with: aluminum chloride    Outcome: patient tolerated procedure well    Post-procedure details: sterile dressing applied and wound care instructions given    Dressing type: bandage and petrolatum      Staff Communication: Dermatology Local Anesthesia: 1 % Lidocaine / Epinephrine - Amount:0.5ml    Specimen 4 - Dermatopathology- DERM LAB  Differential Diagnosis: DN  Check Margins Yes/No?:    Comments:    Dermpath Lab: Routine Histopathology (formalin-fixed tissue)    2. Actinic keratosis (17)  Head - Anterior (Face) (17)  Scattered on the patient's face and scalp, there are multiple erythematous, gritty, scaly macules     Actinic Keratoses - scattered on face and scalp.  The pre-cancerous nature of these lesions and treatment options were discussed with the patient today.  At this time, I recommend treatment with liquid nitrogen cryotherapy.  The patient expressed understanding, is in agreement with this plan, and wishes to proceed with cryotherapy today.    Destr of lesion - Head - Anterior (Face)  Complexity: simple    Destruction method: cryotherapy    Informed consent: discussed and consent obtained    Lesion destroyed using liquid nitrogen: Yes    Cryotherapy cycles:  1  Outcome: patient tolerated procedure well with no complications    Post-procedure details: wound care instructions given      3. Melanocytic nevus of trunk  Scattered on the  patient's face, neck, trunk, and extremities, there are multiple small, round- to oval-shaped, brown-pigmented and pink-colored, symmetric, uniform-appearing macules and dome-shaped papules    Clinically benign- to slightly atypical-appearing nevi - the clinically benign- to slightly atypical-appearing nature of the remainder of the patient's nevi was discussed with the patient today.  None of the patient's nevi, with the exception of the 2 noted above, meet threshold for biopsy today.  I emphasized the importance of performing monthly self-skin exams using the ABCDs of monitoring moles, which were reviewed with the patient today and an informational hand-out provided.  I also emphasized the importance of sun avoidance and sun protection with daily sunscreen use.    4. Seborrheic keratosis  Scattered on the patient's face, neck, trunk, and extremities, there are multiple tan- to light brown-colored, hyperkeratotic, stuck-on appearing papules of varying size and shape    Seborrheic Keratoses - the benign nature of these lesions was discussed with the patient today and reassurance provided.  No treatment is medically indicated for these lesions at this time.    5. Lentigo  Photodistributed  Multiple tan- to light brown-colored, round- to oval-shaped, symmetric and uniform-appearing macules and small patches consistent with lentigines scattered in sun-exposed areas.    Solar Lentigines and photodamage.  The clinically benign-appearing nature of these lesions and their relation to chronic sun exposure were discussed with the patient today and reassurance provided.  None of these lesions meet threshold for biopsy today, and thus no treatment is medically indicated for these lesions at this time.  The signs and symptoms of skin cancer were reviewed and the patient was advised to practice sun protection and sun avoidance, use daily sunscreen, and perform regular self skin exams.  The patient was instructed to monitor these  lesions for any changes, such as in size, shape, or color, or associated symptoms and to call our office to schedule a return visit for re-evaluation if any such changes or symptoms are noticed in the future.  The patient expressed understanding and is in agreement with this plan.    6. Dermatitis  Right Lower Leg - Anterior  On the patient's bilateral legs, there are multiple erythematous, scaly papules coalescing into several ill-defined, slightly lichenified, thin plaques, some of which have a dry or cracked or red appearance    Eczema, possibly Asteatotic Dermatitis - flare on bilateral legs.  The potentially chronic and intermittently flaring nature of this condition and treatment options were discussed extensively with the patient today.  At this time, I recommend topical steroid therapy with Triamcinolone 0.025% cream, which the patient was instructed to apply twice daily to the affected areas of his legs (avoid face, groin, body folds) for the next 2 weeks, followed by taper to twice daily on weekends only for persistent areas; the patient may repeat treatment in a 2-week burst-and-taper fashion every 6-8 weeks as needed for future flares.  I also emphasized the importance of dry, sensitive skin care, including the use of a mild soap, such as Dove, and frequent and aggressive moisturization, at least twice daily and immediately following showers or baths, with recommended over-the-counter moisturizing creams, such as Eucerin, Cetaphil, Cerave, or Aveeno, or Vaseline or Aquaphor ointments.  The risks, benefits, and side effects of this medication, including possible skin atrophy with overuse of topical steroids, were discussed.  The patient expressed understanding and is in agreement with this plan.    triamcinolone (Kenalog) 0.025 % cream - Right Lower Leg - Anterior  Apply twice daily to affected areas of legs (avoid face, groin, body folds) for 2 weeks, then taper to twice daily on weekends only; repeat  every 6-8 weeks as needed for flares    7. Squamous cell carcinoma of skin  Right Lateral Distal Forearm  Pink, well-healed scar at recent biopsy site    Biopsy-proven squamous cell carcinoma in situ - right lateral distal forearm; diagnosed on 8/14/23 and pending definitive treatment.  The malignant nature of SCC in situ, the need for further definitive treatment of this lesion, and treatment options, including Mohs surgery, wide local excision, and Electrodesiccation & Curettage, were discussed extensively with the patient today.  After discussing the risks and benefits of each of these options, including the differences in cure rates and permanent scar results, with the patient as well as with our Dermatopathologist, Dr. Tse, over the phone during the patient's visit today, he patient expressed understanding, and Dr. Tse and myself both recommend referral to our Dermatologic surgery unit for definitive treatment of this SCC in situ.  He expressed understanding, is in agreement with this plan, and already scheduled his surgery to be performed in our office by Dr. Mujica on 6/28/24.    8. Personal history of malignant melanoma of skin  The sites of prior melanoma excision were examined.  There is no evidence of recurrent growth or pigmentation or recurrent disease, satellite papules, or nodules on exam today.    History of melanoma, lentigo maligna type, nonmelanoma skin cancers, dysplastic nevi, and actinic keratoses and photodamage.  There is no evidence of local, regional, or distant recurrent disease or any new primary melanomas on exam today.  I emphasized the importance of continuing to perform monthly self-skin and lymph node exams using the ABCDs of monitoring moles, which were reviewed with the patient, as well as the importance of sun avoidance and sun protection with daily sunscreen use.  I will have the patient return to our office in 3 to 4 months, pending the above biopsy results, for routine  melanoma follow-up and total body skin exam, and the patient was instructed to call our office should the patient notice any new, changing, symptomatic, or otherwise concerning skin lesions before then.  The patient expressed understanding and is in agreement with this plan.    9. Diffuse photodamage of skin  Photodistributed  Diffuse photodamage with actinic changes with telangiectasia and mottled pigmentation in sun-exposed areas.    Photodamage.  The signs and symptoms of skin cancer were reviewed and the patient was advised to practice sun protection and sun avoidance, use daily sunscreen, and perform regular self skin exams.  Sun protection was discussed, including avoiding the mid-day sun, wearing a sunscreen with SPF at least 50, and stressing the need for reapplication of sunscreen and applying more than they think they need.

## 2024-04-21 PROBLEM — Z79.01 WARFARIN ANTICOAGULATION: Status: ACTIVE | Noted: 2024-04-21

## 2024-04-21 PROBLEM — I42.9 CARDIOMYOPATHY (MULTI): Status: ACTIVE | Noted: 2024-04-21

## 2024-04-21 PROBLEM — Z87.74 HISTORY OF ATRIAL SEPTAL DEFECT: Status: ACTIVE | Noted: 2024-04-21

## 2024-04-21 PROBLEM — I34.0 MITRAL VALVE REGURGITATION: Status: ACTIVE | Noted: 2024-04-21

## 2024-04-21 NOTE — PROGRESS NOTES
[facility] and Vascular Cardiology    Patient Name: Raman Lawrence  Patient : 1955      Scribe Attestation  By signing my name below, IShikha Scribe   attest that this documentation has been prepared under the direction and in the presence of Pantera Fong DO.      Reason for visit:  This is a 68-year-old male here for follow-up regarding chronic atrial fibrillation, anticoagulation with warfarin followed by the anticoagulation clinic, history of cardiomyopathy with an ejection fraction patricia of 45% now improved to 60%, history of primum ASD with prior surgical repair as well as mitral valve repair done in , valvular heart disease with moderate mitral valve regurgitation and moderate tricuspid valve regurgitation, hypertension, dyslipidemia on atorvastatin 10 mg daily.    HPI:  This is a 68-year-old male here for follow-up regarding chronic atrial fibrillation, anticoagulation with warfarin followed by the anticoagulation clinic, history of cardiomyopathy with an ejection fraction patricia of 45% now improved to 60%, history of primum ASD with prior surgical repair as well as mitral valve repair done in , valvular heart disease with moderate mitral valve regurgitation and moderate tricuspid valve regurgitation, hypertension, dyslipidemia on atorvastatin 10 mg daily.  The patient was last evaluated by me in 2023.  At that visit I ordered blood work including CMP/lipid/magnesium/CBC/BNP, ordered an echocardiogram, recommended a sleep study which the patient declined, and asked the patient to follow-up in 6 months and sooner if necessary.  Patient was subsequently seen by the cardiology PA in 2023 at which time he was doing well and asked to follow-up again in 6 months.  Echocardiogram done in May 2023 showed normal left ventricular systolic function with an ejection fraction of 60%, dilated right ventricle with normal right ventricular systolic  function, biatrial dilatation, moderate mitral valve regurgitation, moderate tricuspid valve regurgitation. ECG done today showed atrial fibrillation with a heart rate of 69 bpm.  The patient reports that he has been feeling generally well from the cardiac standpoint. He denies any new chest pain, shortness of breath, palpitations and lightheadedness. He states that his lower extremity edema had been stable. He states that his usual blood pressure is in the 120-130s. He states that he takes all of his medications as prescribed. During my exam, he was resting comfortably on the exam table.            Assessment/Plan:   1. Chronic atrial fibrillation  The patient has a history of chronic atrial fibrillation and is currently on warfarin for thromboembolism prophylaxis.  Warfarin dosing and INR monitoring is being managed by the anticoagulation clinic through the pharmacy.  Patient should continue diltiazem for heart rate control.  ECG done today showed atrial fibrillation with a heart rate of 69 bpm.    He denies chest pain, palpitations or lightheadedness.  Echocardiogram done in May 2023 showed normal left ventricular systolic function with an ejection fraction of 60%, dilated right ventricle with normal right ventricular systolic function, biatrial dilatation, moderate mitral valve regurgitation, moderate tricuspid valve regurgitation.  Lab works will be done today and again in 6 months prior to the next visit.   Echocardiogram will be updated in 6 months.  Follow up in 6 months and sooner if necessary.      2. Anticoagulation with warfarin  The patient is on anticoagulation with warfarin for chronic atrial fibrillation.  Warfarin dosing and INR monitoring is being managed by the anticoagulation clinic through the pharmacy.  Lab works will be done today and again in 6 months prior to the next visit.      3.  History of cardiomyopathy/HFpEF  The patient has a history of  history of cardiomyopathy with an ejection  fraction patricia of 45% now improved to 60%.  Echocardiogram done in May 2023 showed normal left ventricular systolic function with an ejection fraction of 60%, dilated right ventricle with normal right ventricular systolic function, biatrial dilatation, moderate mitral valve regurgitation, moderate tricuspid valve regurgitation.  He does have 1+ pitting edema on exam today.  Suggested additional diuretic therapy which he declined at this time.   He should continue his current cardiac medications.  Lab works will be done today and again in 6 months prior to the next visit.   Echocardiogram will be updated in 6 months.  I discussed with him the importance of following a low-sodium heart healthy diet.  Follow up in 6 months and sooner if necessary.      4. History of primum ASD  The patient has a history of history of primum ASD with prior surgical repair as well as mitral valve repair done in 1980 and 1981.   Echocardiogram done in May 2023 showed normal left ventricular systolic function with an ejection fraction of 60%, dilated right ventricle with normal right ventricular systolic function, biatrial dilatation, moderate mitral valve regurgitation, moderate tricuspid valve regurgitation.  Echocardiogram will be updated in 6 months.     5. Mitral valve regurgitation  The patient has a history of moderate mitral valve regurgitation.  Echocardiogram done in May 2023 showed normal left ventricular systolic function with an ejection fraction of 60%, dilated right ventricle with normal right ventricular systolic function, biatrial dilatation, moderate mitral valve regurgitation, moderate tricuspid valve regurgitation.  Echocardiogram will be updated in 6 months.     6. Hypertension  Patient has a history of hypertension which appears moderately controlled on exam today.  He should continue his current antihypertensive medications.      7. Dyslipidemia  Lipid panel done in April 2023 showed an LDL cholesterol of 69 and  triglycerides of 101 while on atorvastatin 10 mg daily.  Please see lifestyle recommendations below.     8. Preoperative cardiovascular examination  The patient is being evaluated for colonoscopy although it has not been scheduled yet. He does have a history of cardiomyopathy with an ejection fraction patricia of 45% now improved to 60%, chronic atrial fibrillation on warfarin, and valvular heart disease. He denies a history of ischemic cardiovascular disease, ischemic cerebrovascular disease, insulin-dependent diabetes mellitus, or significant renal dysfunction.     RCRI of 1 based on history placing him at low risk of perioperative MACE.    I do not believe any additional preoperative testing is necessary at this time. Patient should continue his current cardiac medications perioperatively. The patient can hold warfarin prior to the procedure but I will defer to the anticoagulation clinic who has been managing his warfarin dosing and INR monitoring. Overall, the patient appears to be low risk for perioperative MACE. The patient expressed understanding of his risk for perioperative cardiovascular complications.       Orders:   Preoperative cardiovascular examination  Suggested diuretic therapy -patient declined.  CMP/lipid/magnesium/CBC,   BMP/CBC/magnesium in 6 months,   Echocardiogram in 6 months,   Follow-up in 6 months.    Lifestyle Recommendations  I recommend a whole-food plant-based diet, an eating pattern that encourages the consumption of unrefined plant foods (such as fruits, vegetables, tubers, whole grains, legumes, nuts and seeds) and discourages meats, dairy products, eggs and processed foods.     The AHA/ACC recommends that the patient consume a dietary pattern that emphasizes intake of vegetables, fruits, and whole grains; includes low-fat dairy products, poultry, fish, legumes, non-tropical vegetable oils, and nuts; and limits intake of sodium, sweets, sugar-sweetened beverages, and red meats.  Adapt  this dietary pattern to appropriate calorie requirements (a 500-750 kcal/day deficit to loose weight), personal and cultural food preferences, and nutrition therapy for other medical conditions (including diabetes).  Achieve this pattern by following plans such as the [place] Mediterranean, DASH dietary pattern, or AHA diet.     Engage in 2 hours and 30 minutes per week of moderate-intensity physical activity, or 1 hour and 15 minutes (75 minutes) per week of vigorous-intensity aerobic physical activity, or an equivalent combination of moderate and vigorous-intensity aerobic physical activity. Aerobic activity should be performed in episodes of at least 10 minutes preferably spread throughout the week.     Adhering to a heart healthy diet, regular exercise habits, avoidance of tobacco products, and maintenance of a healthy weight are crucial components of their heart disease risk reduction.     Any positive review of systems not specifically addressed in the office visit today should be evaluated and treated by the patients primary care physician or in an emergency department if necessary     Patient was notified that results from ordered tests will be called to the patient if it changes current management; it will otherwise be discussed at a future appointment and available on [facility].     Thank you for allowing me to participate in the care of this patient.        This document was generated using the assistance of voice recognition software. If there are any errors of spelling, grammar, syntax, or meaning; please feel free to contact me directly for clarification.    Past Medical History:  He has a past medical history of ASD (atrial septal defect) ([facility]), Cardiomyopathy (Multi), Chronic atrial fibrillation with RVR (Multi), Dyslipidemia, Fatigue, Hypertension, Mitral valve regurgitation, Other specified health status, Personal history of other diseases of the circulatory system, Personal history of other  diseases of the circulatory system, Personal history of other diseases of the circulatory system, Personal history of other endocrine, nutritional and metabolic disease, and Warfarin anticoagulation.    Past Surgical History:  He has a past surgical history that includes Cardiac surgery (10/12/2015); Appendectomy (10/12/2015); Other surgical history (10/12/2015); Other surgical history (10/12/2015); Other surgical history (10/12/2015); Other surgical history (12/30/2020); and Other surgical history (06/20/2019).      Social History:  He reports that he has never smoked. He has never used smokeless tobacco. He reports that he does not currently use alcohol. He reports that he does not use drugs.    Family History:  Family History   Problem Relation Name Age of Onset    No Known Problems Mother      Heart attack Father          Allergies:  Halothane    Outpatient Medications:  Current Outpatient Medications   Medication Instructions    acetaminophen (Tylenol) 325 mg tablet 2 tablet EVERY 6 HOURS (route: oral)    atorvastatin (Lipitor) 10 mg tablet 1 tablet, oral, Daily    clindamycin (Cleocin T) 1 % lotion 1 Application    dilTIAZem CD (CARDIZEM CD) 240 mg, oral, Daily    lisinopril 5 mg, oral, Daily    metoprolol succinate XL (TOPROL-XL) 25 mg, oral, Daily    metroNIDAZOLE (Metrocream) 0.75 % cream 0    triamcinolone (Kenalog) 0.025 % cream Apply twice daily to affected areas of legs (avoid face, groin, body folds) for 2 weeks, then taper to twice daily on weekends only; repeat every 6-8 weeks as needed for flares    warfarin (Coumadin) 2.5 mg tablet Take 1 tablet (2.5mg) by mouth on Monday, Wednesday, Friday and 1/2 tablet (1.25mg) by mouth all other days or as directed by Coumadin clinic.        ROS:  A 14 point review of systems was done and is negative other than as stated in HPI    Vitals:      3/3/2022     3:06 PM 9/1/2022     3:40 PM 9/30/2022     3:02 PM 4/20/2023     3:44 PM 5/24/2023     4:00 PM 10/27/2023  "    3:31 PM 11/15/2023     3:50 PM   Vitals   Systolic 148 129 136 112 150 118 131   Diastolic 92 87 90 74 76 80 90   Heart Rate 85 84 72 56 73 60    Resp 16         Height (in) 1.88 m (6' 2\") 1.88 m (6' 2\") 1.88 m (6' 2\") 1.88 m (6' 2\") 1.88 m (6' 2\")     Weight (lb) 228 225  225 225 221    BMI 29.27 kg/m2 28.89 kg/m2 28.89 kg/m2 28.89 kg/m2 28.89 kg/m2 28.37 kg/m2    BSA (m2) 2.32 m2 2.31 m2 2.31 m2 2.31 m2 2.31 m2 2.28 m2         Physical Exam:   Constitutional: Cooperative, in no acute distress, alert, appears stated age.  Skin: Skin color, texture, turgor normal. No rashes or lesions.  Head: Normocephalic. No masses, lesions, tenderness or abnormalities  Eyes: Extraocular movements are grossly intact.  Mouth and throat: Mucous membranes moist  Neck: Neck supple, no carotid bruits, no JVD  Respiratory: Lungs clear to auscultation, no wheezing or rhonchi, no use of accessory muscles  Chest wall: No scars, normal excursion with respiration  Cardiovascular: Irregular rhythm, + murmur  Gastrointestinal: Abdomen soft, nontender. Bowel sounds normal.  Musculoskeletal: Strength equal in upper extremities  Extremities: 1+ pitting edema  Neurologic: Sensation grossly intact, alert and oriented x3    Intake/Output:   No intake/output data recorded.    Outpatient Medications  Current Outpatient Medications on File Prior to Visit   Medication Sig Dispense Refill    acetaminophen (Tylenol) 325 mg tablet 2 tablet EVERY 6 HOURS (route: oral)      atorvastatin (Lipitor) 10 mg tablet Take 1 tablet (10 mg) by mouth once daily.      clindamycin (Cleocin T) 1 % lotion 1 Application      dilTIAZem CD (Cardizem CD) 240 mg 24 hr capsule Take 1 capsule (240 mg) by mouth once daily. 90 capsule 3    lisinopril 5 mg tablet Take 1 tablet (5 mg) by mouth once daily. 90 tablet 1    metoprolol succinate XL (Toprol-XL) 25 mg 24 hr tablet Take 1 tablet (25 mg) by mouth once daily. 90 tablet 3    warfarin (Coumadin) 2.5 mg tablet Take 1 tablet " (2.5mg) by mouth on Monday, Wednesday, Friday and 1/2 tablet (1.25mg) by mouth all other days or as directed by Coumadin clinic. 60 tablet 3    metroNIDAZOLE (Metrocream) 0.75 % cream 0      triamcinolone (Kenalog) 0.025 % cream Apply twice daily to affected areas of legs (avoid face, groin, body folds) for 2 weeks, then taper to twice daily on weekends only; repeat every 6-8 weeks as needed for flares 80 g 2    [DISCONTINUED] Jantoven 2.5 mg tablet take 1/2 to 1 tablet by mouth once daily as directed       No current facility-administered medications on file prior to visit.       Labs: (past 26 weeks)  Recent Results (from the past 4368 hour(s))   PSA    Collection Time: 11/11/23  9:17 AM   Result Value Ref Range    Prostate Specific AG 4.93 (H) <=4.00 ng/mL   Protime-INR    Collection Time: 11/11/23  9:17 AM   Result Value Ref Range    Protime 25.9 (H) 9.8 - 12.8 seconds    INR 2.3 (H) 0.9 - 1.1   Protime-INR    Collection Time: 12/16/23  8:51 AM   Result Value Ref Range    Protime 22.8 (H) 9.8 - 12.8 seconds    INR 2.0 (H) 0.9 - 1.1   Dermatopathology- DERM LAB    Collection Time: 12/18/23 11:38 AM   Result Value Ref Range    Case Report       Dermatopathology                                  Case: [id number]                                   Authorizing Provider:  Brendan Cobb MD          Collected:           12/18/2023 1138              Ordering Location:     [facility]       Received:            12/18/2023 1254              Pathologist:           Esperanza Tse MD                                                             Specimens:   A) - SKIN SHAVE EXCISION, Right Upper Back                                                          B) - SKIN SHAVE EXCISION, Left Lateral Distal Arm                                          FINAL DIAGNOSIS       A. SKIN, RIGHT UPPER BACK, SHAVE EXCISION:  INFLAMED MILDLY DYSPLASTIC COMPOUND NEVUS, INKED MARGINS FREE IN THE PLANES OF SECTIONS EXAMINED.    B. SKIN, LEFT  LATERAL DISTAL ARM, SHAVE EXCISION:  LENTIGINOUS COMPOUND NEVUS, INKED MARGINS FREE IN THE PLANES OF SECTIONS EXAMINED.    ** Electronically signed out by Esperanza Tse MD **                 By the signature on this report, the individual or group listed as making the Final Interpretation/Diagnosis certifies that they have reviewed this case.       Clinical History       Encounter Diagnosis: Neoplasm of uncertain behavior of skin       [id number]  Collection Comments: Differential Diagnosis: DN v SK v BCC  Check Margins Yes/No?:    Comments:    Dermpath Lab: Routine Histopathology (formalin-fixed tissue)  Finding Region: Right Upper Back  Specimen Objective: 6 mm dark brown pigmented, asymmetric macule with an asymmetric pigment network and irregular borders     [id number]  Collection Comments: Differential Diagnosis: DN v SK v BCC  Check Margins Yes/No?:    Comments:    Dermpath Lab: Routine Histopathology (formalin-fixed tissue)  Finding Region: Left Elbow - Posterior  Specimen Objective: 4 mm dark brown pigmented, asymmetric macule with an asymmetric pigment network and irregular borders           Microscopic Description       A. Microscopic analysis shows an asymmetric proliferation of melanocytes, with lentiginous hyperplasia of epidermis.  In addition to melanocytes that  nest irregularly along the dermal-epidermal junction, other features of dysplasia include a shoulder of junctional nests of melanocytes that extend beyond the dermal nests, melanocyte bridging, papillary dermal fibroplasia, melanophages, and inflammatory cells in papillary dermis.  The melanocytes in epidermis show random cytologic atypia.    B. Microscopic examination reveals mild basal layer melanin pigmentation.  There is an increase in single melanocytes along the dermal-epidermal junction. No pagetoid extension is seen.   There are also nests of benign appearing melanocytes in the superficial dermis.          Gross Description       A:  Received in formalin is a 7 x 7 x 1 mm piece of skin.  It is tan and brown in color.  It is shave in shape.  It was embedded in toto.  The specimen was inked.      B: Received in formalin is a 7 x 6 x 1 mm piece of skin.  It is tan and brown in color.  It is shave in shape.  It was embedded in toto.  The specimen was inked.       The specimen was grossed by Melisa Marie.      Protime-INR    Collection Time: 01/26/24  3:49 PM   Result Value Ref Range    Protime 29.1 (H) 9.8 - 12.8 seconds    INR 2.6 (H) 0.9 - 1.1   Protime-INR    Collection Time: 02/28/24  3:23 PM   Result Value Ref Range    Protime 25.9 (H) 9.8 - 12.8 seconds    INR 2.3 (H) 0.9 - 1.1   Protime-INR    Collection Time: 04/11/24  3:35 PM   Result Value Ref Range    Protime 28.7 (H) 9.8 - 12.8 seconds    INR 2.5 (H) 0.9 - 1.1   Dermatopathology- DERM LAB    Collection Time: 04/17/24  3:22 PM   Result Value Ref Range    Case Report       Dermatopathology                                  Case: [id number]                                   Authorizing Provider:  Brendan Cobb MD          Collected:           04/17/2024 1522              Ordering Location:     [facility] Received:            04/17/2024 1554              Pathologist:           Esperanza Tse MD                                                             Specimens:   A) - SKIN, Right Anterior Distal Leg                                                                B) - SKIN, Left Lateral Posterior Neck                                                              C) - SKIN, Right Lateral Proximal Arm                                                               D) - SKIN, Right Posterior Distal Arm                                                      FINAL DIAGNOSIS       A. SKIN, RIGHT ANTERIOR DISTAL LEG, SHAVE BIOPSY:  BASAL CELL CARCINOMA, SUPERFICIAL GROWTH PATTERN, PRESENT ON THE DEEP MARGIN.    B. SKIN, LEFT LATERAL POSTERIOR NECK, SHAVE EXCISION:  BASAL CELL  CARCINOMA, NODULAR GROWTH PATTERN, PRESENT ON THE DEEP MARGIN.    C. SKIN, RIGHT LATERAL PROXIMAL ARM, SHAVE EXCISION:  MILDLY DYSPLASTIC COMPOUND NEVUS, INKED MARGINS FREE IN THE PLANES OF SECTIONS EXAMINED.    D. SKIN, RIGHT POSTERIOR DISTAL ARM, SHAVE EXCISION:  MILDLY DYSPLASTIC COMPOUND NEVUS, LESS THAN 0.1 MM FROM THE DEEP MARGIN.     ** Electronically signed out by Esperanza Tse MD **                By the signature on this report, the individual or group listed as making the Final Interpretation/Diagnosis certifies that they have reviewed this case.       Clinical History       Encounter Diagnosis: Neoplasm of uncertain behavior of skin       [id number]  Collection Comments: Differential Diagnosis: BCC v BLK v SCCIS  Check Margins Yes/No?:    Comments:    Dermpath Lab: Routine Histopathology (formalin-fixed tissue)  Finding Region: Right Lower Leg - Anterior  Specimen Objective: 7 mm pink, shiny papule     [id number]  Collection Comments: Differential Diagnosis: BCC v IDN  Check Margins Yes/No?:    Comments:    Dermpath Lab: Routine Histopathology (formalin-fixed tissue)  Finding Region: Left Anterior Neck  Specimen Objective: 7 mm pink, shiny papule     [id number]  Collection Comments: Differential Diagnosis: DN  Check Margins Yes/No?:    Comments:    Dermpath Lab: Routine Histopathology (formalin-fixed tissue)  Finding Region: Right Upper Arm - Anterior  Specimen Objective: 7 mm dark brown pigmented, asymmetric macule with an asymmetric pigment network and irregular borders     [id number]  Collection Comments: Differential Diagnosis: DN  Check Margins Yes/No?:    Comments:    Dermpath Lab: Routine Histopathology (formalin-fixed tissue)  Finding Region: Right Upper Arm - Anterior  Specimen Objective: 7 mm dark brown pigmented, asymmetric macule with an asymmetric pigment network and irregular borders       Microscopic Description       A. Microscopic analysis shows multifocal buds and irregular  proliferations of bland basaloid keratinocytes with palisaded arrangement of peripheral nuclei arising from epidermis.  Mucinous stroma separates the carcinoma buds from dermis.  Skip areas are present, where normal epidermis separates focal areas of carcinoma.    B. Microscopic analysis shows a discrete nodule of tumor that is associated with the epidermis.  The carcinoma is composed of bland basaloid keratinocytes with peripheral palisaded arrangement of nuclei.    C. Microscopic analysis shows an asymmetric proliferation of melanocytes, with lentiginous hyperplasia of epidermis.  In addition to melanocytes that  nest irregularly along the dermal-epidermal junction, other features of dysplasia include a shoulder of junctional nests of melanocytes that extend beyond the dermal nests, melanocyte bridging, papillary dermal fibroplasia, melanophages, and inflammatory cells in papillary dermis.  The melanocytes in epidermis show random cytologic atypia.    D. Microscopic analysis shows an asymmetric proliferation of melanocytes, with lentiginous hyperplasia of epidermis.  In addition to melanocytes that  nest irregularly along the dermal-epidermal junction, other features of dysplasia include a shoulder of junctional nests of melanocytes that extend beyond the dermal nests, melanocyte bridging, papillary dermal fibroplasia, melanophages, and inflammatory cells in papillary dermis.  The melanocytes in epidermis show random cytologic atypia.      Gross Description       A:  Received in formalin is a 4 x 3 x 1 mm piece of skin.  It is tan in color.  It is shave in shape.  It was embedded in toto.  The specimen was inked.      B:  Received in formalin is a 5 x 4 x 1 mm piece of skin.  It is tan in color.  It is shave in shape.  It was embedded in toto.  The specimen was inked.      C:   Received in formalin is a 6 x 6 x 1 mm piece of skin.  It is tan and brown in color.  It is shave in shape.  It was embedded in toto.  The  specimen was inked.    D:  Received in formalin is a 6 x 5 x 1 mm piece of skin.  It is tan and brown in color.  It is shave in shape.  It was embedded in toto.  The specimen was inked.      The specimen was grossed by Vidya Gregory.          ECG  No results found for this or any previous visit (from the past 4464 hour(s)).    Echocardiogram  No results found for this or any previous visit from the past 1095 days.      CV Studies:  EKG: No results found for this or any previous visit (from the past 4464 hour(s)).  Echocardiogram:   Echocardiogram     [place] [id number]  Phone [phone] Fax [phone]    TRANSTHORACIC ECHOCARDIOGRAM REPORT      Patient Name:     ERIN HOLDEN Reading Physician:   [id number] Yulissa Andujar DO  Study Date:       5/9/2023          Referring Physician: YULISSA ANDUJAR  MRN/PID:          [id number]          PCP:  Accession/Order#: [id number]      Department Location: [facility]  YOB: 1955        Fellow:  Gender:           KASIA                 Nurse:               Aleja Worley  Admit Date:                         Sonographer:         Juanita Acevedo  Admission Status: Outpatient        Additional Staff:  Height:           187.96 cm         CC Report to:  Weight:           104.33 kg         Study Type:          Echocardiogram  BSA:              2.31 m2  Blood Pressure: 123 /70 mmHg    Diagnosis/ICD: Q21.20-Atrioventricular septal defect, unspecified as to partial  or complete; I34.0-Nonrheumatic mitral (valve) insufficiency  Indication:    ASD, Mitral regurgitation  Procedure/CPT: Echo Complete w Full [id number]    Patient History:  Pertinent History: ASD repair, MV repair.    Study Detail: The following Echo studies were performed: 2D, M-Mode, Doppler and  color flow. Technically challenging study due to body habitus and  prominent lung artifact. Optison used as a contrast agent for  endocardial border  definition and agitated saline used as a  contrast agent for intraseptal flow evaluation.      PHYSICIAN INTERPRETATION:  Left Ventricle: Left ventricular systolic function is normal, with an estimated ejection fraction of 60-65%. There are no regional wall motion abnormalities. The left ventricular cavity size is normal. There is mildly increased left ventricular posterior wall thickness. Left ventricular diastolic filling was indeterminate.  Left Atrium: The left atrium is moderate to severely dilated. A bubble study using agitated saline was performed. Bubble study is negative.  Right Ventricle: The right ventricle is mildly enlarged. There is normal right ventricular global systolic function.  Right Atrium: The right atrium is moderately dilated.  Aortic Valve: The aortic valve is trileaflet. There is no evidence of aortic valve regurgitation. The peak instantaneous gradient of the aortic valve is 12.0 mmHg. The mean gradient of the aortic valve is 7.0 mmHg.  Mitral Valve: The mitral valve is mildly thickened. There is moderate mitral valve regurgitation.  Tricuspid Valve: The tricuspid valve is structurally normal. There is moderate tricuspid regurgitation. The Doppler estimated RVSP is mild to moderately elevated at 41.4 mmHg.  Pulmonic Valve: The pulmonic valve is structurally normal. There is trace pulmonic valve regurgitation.  Pericardium: There is no pericardial effusion noted.  Aorta: The aortic root is normal.      CONCLUSIONS:  1. Left ventricular systolic function is normal with a 60-65% estimated ejection fraction.  2. The left atrium is moderate to severely dilated.  3. The right atrium is moderately dilated.  4. Moderate mitral valve regurgitation.  5. Mild to moderately elevated right ventricular systolic pressure.  6. Moderate tricuspid regurgitation.    QUANTITATIVE DATA SUMMARY:  2D MEASUREMENTS:  Normal Ranges:  Ao Root d:     3.10 cm   (2.0-3.7cm)  LAs:           6.10 cm   (2.7-4.0cm)  IVSd:           0.80 cm   (0.6-1.1cm)  LVPWd:         1.20 cm   (0.6-1.1cm)  LVIDd:         5.50 cm   (3.9-5.9cm)  LVIDs:         3.90 cm  LV Mass Index: 92.4 g/m2  LV % FS        29.1 %    LA VOLUME:  Normal Ranges:  LA Vol A4C:        118.2 ml   (22+/-6mL/m2)  LA Vol A2C:        108.8 ml  LA Vol BP:         114.4 ml  LA Vol Index A4C:  51.3ml/m2  LA Vol Index A2C:  47.2 ml/m2  LA Vol Index BP:   49.6 ml/m2  LA Area A4C:       32.6 cm2  LA Area A2C:       31.0 cm2  LA Major Axis A4C: 7.6 cm  LA Major Axis A2C: 7.5 cm    RA VOLUME BY A/L METHOD:  Normal Ranges:  RA Area A4C: 29.5 cm2    AORTA MEASUREMENTS:  Normal Ranges:  Ao Sinus, d: 3.10 cm (2.1-3.5cm)  Ao STJ, d:   2.30 cm (1.7-3.4cm)  Asc Ao, d:   3.09 cm (2.1-3.4cm)    LV SYSTOLIC FUNCTION BY 2D PLANIMETRY (MOD):  Normal Ranges:  EF-A4C View: 71.2 % (>=55%)  EF-A2C View: 63.3 %  EF-Biplane:  67.0 %    LV DIASTOLIC FUNCTION:  Normal Ranges:  MV Peak E:    1.37 m/s (0.7-1.2 m/s)  MV e'         0.08 m/s (>8.0)  MV lateral e' 0.07 m/s  MV medial e'  0.10 m/s  E/e' Ratio:   16.12    (<8.0)    MITRAL VALVE:  Normal Ranges:  MV mean PG: 3.3 mmHg (<48mmHg)  MV DT:      232 msec (150-240msec)    MITRAL INSUFFICIENCY:  Normal Ranges:  MR VTI:       166.50 cm  MR Vmax:      521.00 cm/s  MR Alias Jamari: 38.8 cm/s  MR Volume:    51.90 ml  MR Flow Rt:   162.40 ml/s  MR EROA:      0.31 cm2    AORTIC VALVE:  Normal Ranges:  AoV Vmax:                1.73 m/s  (<=1.7m/s)  AoV Peak P.0 mmHg (<20mmHg)  AoV Mean P.0 mmHg  (1.7-11.5mmHg)  LVOT Max Jamari:            1.02 m/s  (<=1.1m/s)  AoV VTI:                 40.60 cm  (18-25cm)  LVOT VTI:                20.90 cm  LVOT Diameter:           2.00 cm   (1.8-2.4cm)  AoV Area, VTI:           1.62 cm2  (2.5-5.5cm2)  AoV Area,Vmax:           1.85 cm2  (2.5-4.5cm2)  AoV Dimensionless Index: 0.51    RIGHT VENTRICLE:  RV 1   4.24 cm  RV 2   2.99 cm  RV 3   8.38 cm  TAPSE: 27.0 mm  RV s'  0.11 m/s    TRICUSPID  VALVE/RVSP:  Normal Ranges:  Peak TR Velocity: 2.89 m/s  RV Syst Pressure: 41.4 mmHg (< 30mmHg)  TV E Vmax:        0.47 m/s  (0.3-0.7m/s)  TV A Vmax:        0.61 m/s  IVC Diam:         2.81 cm      [id number] Yulissa Andujar DO  Electronically signed on 5/9/2023 at 4:53:27 PM         Final     Stress Testing IMGRESULT(TPE2678:1:1825): No results found for this or any previous visit from the past 1825 days.    Cardiac Catheterization: No results found for this or any previous visit from the past 1825 days.  No results found for this or any previous visit from the past 3650 days.     Cardiac Scoring: No results found for this or any previous visit from the past 1825 days.    AAA : No results found for this or any previous visit from the past 1825 days.    OTHER: No results found for this or any previous visit from the past 1825 days.    LAST IMAGING RESULTS  Echocardiogram  [place] [id number]  Phone [phone] Fax [phone]    TRANSTHORACIC ECHOCARDIOGRAM REPORT    Patient Name:     ERIN HOLDEN Reading Physician:   [id number] Yulissa Andujar DO  Study Date:       5/9/2023          Referring Physician: YULISSA ANDUJAR  MRN/PID:          [id number]          PCP:  Accession/Order#: [id number]      Department Location: [facility]  YOB: 1955        Fellow:  Gender:           M                 Nurse:               Aleja Worley  Admit Date:                         Sonographer:         Juanitamekhi Acevedo  Admission Status: Outpatient        Additional Staff:  Height:           187.96 cm         CC Report to:  Weight:           104.33 kg         Study Type:          Echocardiogram  BSA:              2.31 m2  Blood Pressure: 123 /70 mmHg    Diagnosis/ICD: Q21.20-Atrioventricular septal defect, unspecified as to partial  or complete; I34.0-Nonrheumatic mitral (valve) insufficiency  Indication:    ASD, Mitral regurgitation  Procedure/CPT: Echo Complete w Full  Doppler-[id number]    Patient History:  Pertinent History: ASD repair, MV repair.    Study Detail: The following Echo studies were performed: 2D, M-Mode, Doppler and  color flow. Technically challenging study due to body habitus and  prominent lung artifact. Optison used as a contrast agent for  endocardial border definition and agitated saline used as a  contrast agent for intraseptal flow evaluation.    PHYSICIAN INTERPRETATION:  Left Ventricle: Left ventricular systolic function is normal, with an estimated ejection fraction of 60-65%. There are no regional wall motion abnormalities. The left ventricular cavity size is normal. There is mildly increased left ventricular posterior wall thickness. Left ventricular diastolic filling was indeterminate.  Left Atrium: The left atrium is moderate to severely dilated. A bubble study using agitated saline was performed. Bubble study is negative.  Right Ventricle: The right ventricle is mildly enlarged. There is normal right ventricular global systolic function.  Right Atrium: The right atrium is moderately dilated.  Aortic Valve: The aortic valve is trileaflet. There is no evidence of aortic valve regurgitation. The peak instantaneous gradient of the aortic valve is 12.0 mmHg. The mean gradient of the aortic valve is 7.0 mmHg.  Mitral Valve: The mitral valve is mildly thickened. There is moderate mitral valve regurgitation.  Tricuspid Valve: The tricuspid valve is structurally normal. There is moderate tricuspid regurgitation. The Doppler estimated RVSP is mild to moderately elevated at 41.4 mmHg.  Pulmonic Valve: The pulmonic valve is structurally normal. There is trace pulmonic valve regurgitation.  Pericardium: There is no pericardial effusion noted.  Aorta: The aortic root is normal.    CONCLUSIONS:  1. Left ventricular systolic function is normal with a 60-65% estimated ejection fraction.  2. The left atrium is moderate to severely dilated.  3. The right atrium is  moderately dilated.  4. Moderate mitral valve regurgitation.  5. Mild to moderately elevated right ventricular systolic pressure.  6. Moderate tricuspid regurgitation.    QUANTITATIVE DATA SUMMARY:  2D MEASUREMENTS:  Normal Ranges:  Ao Root d:     3.10 cm   (2.0-3.7cm)  LAs:           6.10 cm   (2.7-4.0cm)  IVSd:          0.80 cm   (0.6-1.1cm)  LVPWd:         1.20 cm   (0.6-1.1cm)  LVIDd:         5.50 cm   (3.9-5.9cm)  LVIDs:         3.90 cm  LV Mass Index: 92.4 g/m2  LV % FS        29.1 %    LA VOLUME:  Normal Ranges:  LA Vol A4C:        118.2 ml   (22+/-6mL/m2)  LA Vol A2C:        108.8 ml  LA Vol BP:         114.4 ml  LA Vol Index A4C:  51.3ml/m2  LA Vol Index A2C:  47.2 ml/m2  LA Vol Index BP:   49.6 ml/m2  LA Area A4C:       32.6 cm2  LA Area A2C:       31.0 cm2  LA Major Axis A4C: 7.6 cm  LA Major Axis A2C: 7.5 cm    RA VOLUME BY A/L METHOD:  Normal Ranges:  RA Area A4C: 29.5 cm2    AORTA MEASUREMENTS:  Normal Ranges:  Ao Sinus, d: 3.10 cm (2.1-3.5cm)  Ao STJ, d:   2.30 cm (1.7-3.4cm)  Asc Ao, d:   3.09 cm (2.1-3.4cm)    LV SYSTOLIC FUNCTION BY 2D PLANIMETRY (MOD):  Normal Ranges:  EF-A4C View: 71.2 % (>=55%)  EF-A2C View: 63.3 %  EF-Biplane:  67.0 %    LV DIASTOLIC FUNCTION:  Normal Ranges:  MV Peak E:    1.37 m/s (0.7-1.2 m/s)  MV e'         0.08 m/s (>8.0)  MV lateral e' 0.07 m/s  MV medial e'  0.10 m/s  E/e' Ratio:   16.12    (<8.0)    MITRAL VALVE:  Normal Ranges:  MV mean PG: 3.3 mmHg (<48mmHg)  MV DT:      232 msec (150-240msec)    MITRAL INSUFFICIENCY:  Normal Ranges:  MR VTI:       166.50 cm  MR Vmax:      521.00 cm/s  MR Alias Jamari: 38.8 cm/s  MR Volume:    51.90 ml  MR Flow Rt:   162.40 ml/s  MR EROA:      0.31 cm2    AORTIC VALVE:  Normal Ranges:  AoV Vmax:                1.73 m/s  (<=1.7m/s)  AoV Peak P.0 mmHg (<20mmHg)  AoV Mean P.0 mmHg  (1.7-11.5mmHg)  LVOT Max Jamari:            1.02 m/s  (<=1.1m/s)  AoV VTI:                 40.60 cm  (18-25cm)  LVOT VTI:                 20.90 cm  LVOT Diameter:           2.00 cm   (1.8-2.4cm)  AoV Area, VTI:           1.62 cm2  (2.5-5.5cm2)  AoV Area,Vmax:           1.85 cm2  (2.5-4.5cm2)  AoV Dimensionless Index: 0.51    RIGHT VENTRICLE:  RV 1   4.24 cm  RV 2   2.99 cm  RV 3   8.38 cm  TAPSE: 27.0 mm  RV s'  0.11 m/s    TRICUSPID VALVE/RVSP:  Normal Ranges:  Peak TR Velocity: 2.89 m/s  RV Syst Pressure: 41.4 mmHg (< 30mmHg)  TV E Vmax:        0.47 m/s  (0.3-0.7m/s)  TV A Vmax:        0.61 m/s  IVC Diam:         2.81 cm    [id number] Pantera Fong DO  Electronically signed on 5/9/2023 at 4:53:27 PM     Final   Problem List Items Addressed This Visit       Chronic atrial fibrillation (Multi) - Primary    Relevant Medications    atorvastatin (Lipitor) 10 mg tablet    Other Relevant Orders    ECG 12 Lead (Completed)    Comprehensive Metabolic Panel    Lipid Panel    Magnesium    CBC    Basic Metabolic Panel    CBC    Magnesium    Transthoracic Echo (TTE) Complete    Follow Up In Cardiology    Chronic systolic dysfunction of left ventricle    Relevant Medications    atorvastatin (Lipitor) 10 mg tablet    Other Relevant Orders    ECG 12 Lead (Completed)    Comprehensive Metabolic Panel    Lipid Panel    Magnesium    CBC    Basic Metabolic Panel    CBC    Magnesium    Transthoracic Echo (TTE) Complete    Follow Up In Cardiology    Dyslipidemia    Relevant Medications    atorvastatin (Lipitor) 10 mg tablet    Other Relevant Orders    ECG 12 Lead (Completed)    Comprehensive Metabolic Panel    Lipid Panel    Magnesium    CBC    Basic Metabolic Panel    CBC    Magnesium    Transthoracic Echo (TTE) Complete    Follow Up In Cardiology    Hypertension    Relevant Medications    atorvastatin (Lipitor) 10 mg tablet    Other Relevant Orders    ECG 12 Lead (Completed)    Comprehensive Metabolic Panel    Lipid Panel    Magnesium    CBC    Basic Metabolic Panel    CBC    Magnesium    Transthoracic Echo (TTE) Complete    Follow Up In Cardiology    Warfarin  anticoagulation    Relevant Medications    atorvastatin (Lipitor) 10 mg tablet    Other Relevant Orders    ECG 12 Lead (Completed)    Comprehensive Metabolic Panel    Lipid Panel    Magnesium    CBC    Basic Metabolic Panel    CBC    Magnesium    Transthoracic Echo (TTE) Complete    Follow Up In Cardiology    Cardiomyopathy (Multi)    Relevant Medications    atorvastatin (Lipitor) 10 mg tablet    Other Relevant Orders    ECG 12 Lead (Completed)    Comprehensive Metabolic Panel    Lipid Panel    Magnesium    CBC    Basic Metabolic Panel    CBC    Magnesium    Transthoracic Echo (TTE) Complete    Follow Up In Cardiology    History of atrial septal defect    Relevant Medications    atorvastatin (Lipitor) 10 mg tablet    Other Relevant Orders    ECG 12 Lead (Completed)    Comprehensive Metabolic Panel    Lipid Panel    Magnesium    CBC    Basic Metabolic Panel    CBC    Magnesium    Transthoracic Echo (TTE) Complete    Follow Up In Cardiology    Mitral valve regurgitation    Relevant Medications    atorvastatin (Lipitor) 10 mg tablet    Other Relevant Orders    ECG 12 Lead (Completed)    Comprehensive Metabolic Panel    Lipid Panel    Magnesium    CBC    Basic Metabolic Panel    CBC    Magnesium    Transthoracic Echo (TTE) Complete    Follow Up In Cardiology     Other Visit Diagnoses       Permanent atrial fibrillation (Multi)        Relevant Medications    atorvastatin (Lipitor) 10 mg tablet    Other Relevant Orders    ECG 12 Lead (Completed)    Comprehensive Metabolic Panel    Lipid Panel    Magnesium    CBC    Basic Metabolic Panel    CBC    Magnesium    Transthoracic Echo (TTE) Complete    Follow Up In Cardiology                Pantera Fong DO, FACC, FACOI

## 2024-04-24 ENCOUNTER — OFFICE VISIT (OUTPATIENT)
Dept: CARDIOLOGY | Facility: CLINIC | Age: 69
End: 2024-04-24
Payer: COMMERCIAL

## 2024-04-24 ENCOUNTER — TELEPHONE (OUTPATIENT)
Dept: CARDIOLOGY | Facility: CLINIC | Age: 69
End: 2024-04-24

## 2024-04-24 VITALS
HEIGHT: 74 IN | SYSTOLIC BLOOD PRESSURE: 140 MMHG | DIASTOLIC BLOOD PRESSURE: 90 MMHG | WEIGHT: 223 LBS | BODY MASS INDEX: 28.62 KG/M2 | HEART RATE: 69 BPM

## 2024-04-24 DIAGNOSIS — I34.0 NONRHEUMATIC MITRAL VALVE REGURGITATION: ICD-10-CM

## 2024-04-24 DIAGNOSIS — I48.20 CHRONIC ATRIAL FIBRILLATION (MULTI): Primary | ICD-10-CM

## 2024-04-24 DIAGNOSIS — I48.21 PERMANENT ATRIAL FIBRILLATION (MULTI): ICD-10-CM

## 2024-04-24 DIAGNOSIS — I51.9 CHRONIC SYSTOLIC DYSFUNCTION OF LEFT VENTRICLE: ICD-10-CM

## 2024-04-24 DIAGNOSIS — E78.5 DYSLIPIDEMIA: ICD-10-CM

## 2024-04-24 DIAGNOSIS — I42.9 CARDIOMYOPATHY, UNSPECIFIED TYPE (MULTI): ICD-10-CM

## 2024-04-24 DIAGNOSIS — Z79.01 WARFARIN ANTICOAGULATION: ICD-10-CM

## 2024-04-24 DIAGNOSIS — Z87.74 HISTORY OF ATRIAL SEPTAL DEFECT: ICD-10-CM

## 2024-04-24 DIAGNOSIS — I10 PRIMARY HYPERTENSION: ICD-10-CM

## 2024-04-24 PROCEDURE — 1036F TOBACCO NON-USER: CPT | Performed by: INTERNAL MEDICINE

## 2024-04-24 PROCEDURE — 99214 OFFICE O/P EST MOD 30 MIN: CPT | Performed by: INTERNAL MEDICINE

## 2024-04-24 PROCEDURE — 93000 ELECTROCARDIOGRAM COMPLETE: CPT | Performed by: INTERNAL MEDICINE

## 2024-04-24 PROCEDURE — 1159F MED LIST DOCD IN RCRD: CPT | Performed by: INTERNAL MEDICINE

## 2024-04-24 PROCEDURE — 3080F DIAST BP >= 90 MM HG: CPT | Performed by: INTERNAL MEDICINE

## 2024-04-24 PROCEDURE — 3077F SYST BP >= 140 MM HG: CPT | Performed by: INTERNAL MEDICINE

## 2024-04-24 RX ORDER — ATORVASTATIN CALCIUM 10 MG/1
10 TABLET, FILM COATED ORAL DAILY
Qty: 90 TABLET | Refills: 3 | Status: SHIPPED | OUTPATIENT
Start: 2024-04-24

## 2024-04-24 NOTE — TELEPHONE ENCOUNTER
4/24 3:55 pm - At checkout: Patient stated he would like to hold off on scheduling echo and follow up with Dr. Fong (for October) until he has a better idea of his work schedule. He will call our office to schedule in a few weeks/months. - Atif PHILIPPE

## 2024-05-02 ENCOUNTER — PROCEDURE VISIT (OUTPATIENT)
Dept: DERMATOLOGY | Facility: CLINIC | Age: 69
End: 2024-05-02
Payer: COMMERCIAL

## 2024-05-02 DIAGNOSIS — L81.4 LENTIGO: ICD-10-CM

## 2024-05-02 DIAGNOSIS — L57.8 DIFFUSE PHOTODAMAGE OF SKIN: ICD-10-CM

## 2024-05-02 DIAGNOSIS — L82.1 SEBORRHEIC KERATOSIS: ICD-10-CM

## 2024-05-02 DIAGNOSIS — C44.41 BASAL CELL CARCINOMA (BCC) OF SKIN OF NECK: ICD-10-CM

## 2024-05-02 DIAGNOSIS — C44.712 BASAL CELL CARCINOMA (BCC) OF SKIN OF RIGHT LOWER EXTREMITY INCLUDING HIP: Primary | ICD-10-CM

## 2024-05-02 DIAGNOSIS — D22.70 MELANOCYTIC NEVUS OF LOWER EXTREMITY, UNSPECIFIED LATERALITY: ICD-10-CM

## 2024-05-02 DIAGNOSIS — C44.92 SQUAMOUS CELL CARCINOMA OF SKIN: ICD-10-CM

## 2024-05-02 PROCEDURE — 99213 OFFICE O/P EST LOW 20 MIN: CPT | Performed by: DERMATOLOGY

## 2024-05-02 PROCEDURE — 17263 DSTRJ MAL LES T/A/L 2.1-3.0: CPT | Performed by: DERMATOLOGY

## 2024-05-05 NOTE — PROGRESS NOTES
"Subjective     Raman Lawrence is a 68 y.o. male who presents for the following: Discuss recent biopsy results and management options.  Biopsy of 4 suspicious lesions performed at his last visit in our office on 4/17/24 revealed 2 basal cell carcinomas on his right anterior distal leg and left lateral posterior neck and 2 mildly dysplastic compound nevi on his right lateral proximal arm and right posterior distal arm.    Today, the patient states the biopsy sites healed well.  He denies any new, changing, or concerning skin lesions since his last visit; no bleeding, itching, or burning lesions.      Review of Systems:  No other skin or systemic complaints other than what is documented elsewhere in the note.    The following portions of the chart were reviewed this encounter and updated as appropriate:       Skin Cancer History  Biopsy Date Type Location Status   4/17/24 BCC, Superficial Right Anterior Distal Leg Schedule Procedure  5/2/24 4/17/24 BCC Left Lateral Posterior Neck Refer Mohs/Surgeon     Specialty Problems    None      Past Dermatologic / Past Relevant Medical History:    - history of melanoma, lentigo maligna type with Breslow depth 0.5 mm, on right lateral forehead diagnosed by Dr. Inocencia Snyder on 8/10/21 s/p staged wide local excision with peripheral margin control (\"slow Mohs surgery\") by Dr. Fagan on 9/14/21    - 2 recent biopsy-proven BCCs on right anterior distal leg and left lateral posterior neck both diagnosed at last visit on 4/17/24 and pending definitive treatment  - recent biopsy-proven SCC in situ right lateral distal forearm diagnosed on 8/14/23 and pending definitive treatment  - history of BCC on right lateral upper cheek diagnosed on 12/1/22 s/p Mohs surgery by Dr. Gonzalez on 3/23/23  - BCC on left proximal forearm diagnosed on 4/7/22 s/p wide local excision with 5-mm margins by Dr. Gonzalez on 7/13/22  - SCC in situ on right dorsal distal forearm diagnosed on 12/9/21 s/p wide " local excision with 5-mm margins by Dr. Gonzalez on 4/13/22  - BCC on right dorsal mid-forearm diagnosed on 12/9/21 s/p Mohs surgery by Dr. Gonzalez on 3/10/22  - BCC on left temple diagnosed by Dr. Fagan on 9/14/21 s/p Mohs surgery by Dr. Fagan on 9/16/21    - AKs    - junctional dysplastic nevus with mild atypia on right upper back on 12/9/21  - dysplastic junctional nevi in actinically damaged skin on right lateral distal arm and left medial mid forearm both diagnosed on 4/7/22 and s/p re-excision with 3-mm margins by Dr. Portillo on 9/26/22 and re-excision with 5-mm margins by Dr. Gallegos on 10/3/22, respectively  - mildly dysplastic compound nevus on left lateral mid arm diagnosed on 8/4/22  - moderately dysplastic junctional nevus with an area of more significant architectural disorder on right medial dorsal mid forearm diagnosed on 12/1/22 s/p re-excision with 3-mm margins by Dr. Gallegos on 4/3/23  - combined melanocytic nevus with features of dysplastic nevus (mild atypia) on left medial superior upper back and compound dysplastic nevus with mild atypia on left medial upper back both diagnosed on 4/6/23  - mildly dysplastic junctional nevus on right lateral inferior posterior neck diagnosed on 8/14/23  - inflamed mildly dysplastic compound nevus on right upper back diagnosed on 12/18/23  - 2 mildly dysplastic compound nevi on right lateral proximal arm and right posterior distal arm both diagnosed on 4/17/24    - reported history of BCC on left temple treated over 20 years ago with recurrence as detailed above  - reported history of multiple dysplastic nevi diagnosed by his previous outside Dermatologist, Dr. Inocencia Snyder    Family History:    No family history of melanoma or skin cancer    Social History:    The patient states he lives in [place], [place], and works as a  for [employer] at the intersection of [address], and one of his co-workers is a patient in our office as  well    Allergies:  Halothane    Current Medications / CAM's:    Current Outpatient Medications:     acetaminophen (Tylenol) 325 mg tablet, 2 tablet EVERY 6 HOURS (route: oral), Disp: , Rfl:     atorvastatin (Lipitor) 10 mg tablet, Take 1 tablet (10 mg) by mouth once daily., Disp: 90 tablet, Rfl: 3    clindamycin (Cleocin T) 1 % lotion, 1 Application, Disp: , Rfl:     dilTIAZem CD (Cardizem CD) 240 mg 24 hr capsule, Take 1 capsule (240 mg) by mouth once daily., Disp: 90 capsule, Rfl: 3    lisinopril 5 mg tablet, Take 1 tablet (5 mg) by mouth once daily., Disp: 90 tablet, Rfl: 1    metoprolol succinate XL (Toprol-XL) 25 mg 24 hr tablet, Take 1 tablet (25 mg) by mouth once daily., Disp: 90 tablet, Rfl: 3    metroNIDAZOLE (Metrocream) 0.75 % cream, 0, Disp: , Rfl:     triamcinolone (Kenalog) 0.025 % cream, Apply twice daily to affected areas of legs (avoid face, groin, body folds) for 2 weeks, then taper to twice daily on weekends only; repeat every 6-8 weeks as needed for flares, Disp: 80 g, Rfl: 2    warfarin (Coumadin) 2.5 mg tablet, Take 1 tablet (2.5mg) by mouth on Monday, Wednesday, Friday and 1/2 tablet (1.25mg) by mouth all other days or as directed by Coumadin clinic., Disp: 60 tablet, Rfl: 3     Objective   Well appearing patient in no apparent distress; mood and affect are within normal limits.    A skin examination was performed including: Face, neck, and extremities. All findings within normal limits unless otherwise noted below.    Assessment/Plan   1. Basal cell carcinoma (BCC) of skin of right lower extremity including hip  Right anterior distal leg  Pink, well-healed scar at his recent biopsy site    Destr of lesion    Destruction method: electrodesiccation and curettage    Informed consent: discussed and consent obtained    Timeout:  patient name, date of birth, surgical site, and procedure verified  Procedure prep:  Patient was prepped and draped  Anesthesia: the lesion was anesthetized in a standard  fashion    Anesthetic:  1% lidocaine w/ epinephrine 1-100,000 local infiltration  Curettage performed in three different directions: Yes    Electrodesiccation performed over the curetted area: Yes    Curettage cycles:  3  Lesion length (cm):  1.1  Lesion width (cm):  1.1  Margin per side (cm):  0.6  Final wound size (cm):  2.3  Hemostasis achieved with:  electrodesiccation  Outcome: patient tolerated procedure well with no complications    Post-procedure details: sterile dressing applied and wound care instructions given    Dressing type: bandage and petrolatum      Staff Communication: Dermatology Local Anesthesia: 1 % Lidocaine / Epinephrine - Amount:1ml    Biopsy-proven basal cell carcinoma - right anterior distal leg; superficial growth pattern; diagnosed at last visit on 4/17/24 and pending definitive treatment.  The malignant nature of BCC, the need for further definitive treatment, and treatment options, including Mohs surgery, wide local excision, and Electrodesiccation & Curettage, were discussed extensively with the patient today.  After discussing the risks and benefits of each option, including the differences in cure rates and permanent scar results, the patient expressed understanding and wishes to proceed with definitive treatment with ED&C today.  I will have the patient return to our office in 3 months for re-evaluation of the ED&C site as well as routine follow-up and skin exam, and the patient was instructed to call should they notice any new, changing, symptomatic, or concerning skin lesions before then.  The patient expressed understanding, is in agreement with this plan, and wishes to proceed with the ED&C today.    2. Basal cell carcinoma (BCC) of skin of neck  Left lateral posterior neck  Pink, well-healed scar at his recent biopsy site    Biopsy-proven basal cell carcinoma - left lateral posterior neck; nodular growth pattern; diagnosed at last visit on 4/17/24 pending definitive treatment.   The malignant nature of BCC, the need for further definitive treatment, and treatment options, including Mohs surgery, wide local excision, and Electrodesiccation & Curettage, were discussed extensively with the patient today.  After discussing the risks and benefits of each option, including the differences in cure rates and permanent scar results, the patient expressed understanding, and I again recommended referral to our Dermatologic surgery unit for definitive treatment of this BCC, likely with Mohs surgery.  The patient expressed understanding, is in agreement with this plan, and states he already scheduled his surgery to be performed in our office by Dr. Gonzalez on 5/15/24.    3. Squamous cell carcinoma of skin  Right Lateral Distal Forearm  Pink, well-healed scar at his recent biopsy site    Biopsy-proven squamous cell carcinoma in situ - right lateral distal forearm; diagnosed on 8/14/23 and pending definitive treatment.  The malignant nature of SCC in situ, the need for further definitive treatment of this lesion, and treatment options, including Mohs surgery, wide local excision, and Electrodesiccation & Curettage, were discussed extensively with the patient today.  After discussing the risks and benefits of each of these options, including the differences in cure rates and permanent scar results, with the patient as well as with our Dermatopathologist, Dr. Tse, over the phone during the patient's visit today, he patient expressed understanding, and Dr. Tse and myself both recommend referral to our Dermatologic surgery unit for definitive treatment of this SCC in situ.  He expressed understanding, is in agreement with this plan, and already scheduled his surgery to be performed in our office by Dr. Mujica on 6/28/24.    4. Melanocytic nevus of lower extremity, unspecified laterality  Scattered on the patient's face, neck, and extremities, there are several small, round- to oval-shaped, brown-pigmented  and pink-colored, symmetric, uniform-appearing macules and dome-shaped papules    Clinically benign- to slightly atypical-appearing nevi - the clinically benign- to slightly atypical-appearing nature of the patient's nevi was discussed with the patient today.  None of the patient's nevi meet threshold for biopsy today.  I emphasized the importance of performing monthly self-skin exams using the ABCDs of monitoring moles, which were reviewed with the patient today and an informational hand-out provided.  I also emphasized the importance of sun avoidance and sun protection with daily sunscreen use.  The patient expressed understanding and is in agreement with this plan.    5. Seborrheic keratosis  Scattered on the patient's face, neck, and extremities, there are multiple tan- to light brown-colored, hyperkeratotic, stuck-on appearing papules of varying size and shape    Seborrheic Keratoses - the benign nature of these lesions was discussed with the patient today and reassurance provided.  No treatment is medically indicated for these lesions at this time.    6. Lentigo  Photodistributed  Multiple tan- to light brown-colored, round- to oval-shaped, symmetric and uniform-appearing macules and small patches consistent with lentigines scattered in sun-exposed areas.    Solar Lentigines and photodamage.  The clinically benign-appearing nature of these lesions and their relation to chronic sun exposure were discussed with the patient today and reassurance provided.  None of these lesions meet threshold for biopsy today, and thus no treatment is medically indicated for these lesions at this time.  The signs and symptoms of skin cancer were reviewed and the patient was advised to practice sun protection and sun avoidance, use daily sunscreen, and perform regular self skin exams.  The patient was instructed to monitor these lesions for any changes, such as in size, shape, or color, or associated symptoms and to call our  office to schedule a return visit for re-evaluation if any such changes or symptoms are noticed in the future.  The patient expressed understanding and is in agreement with this plan.    7. Diffuse photodamage of skin  Photodistributed  Diffuse photodamage with actinic changes with telangiectasia and mottled pigmentation in sun-exposed areas.    History of melanoma, lentigo maligna type, nonmelanoma skin cancers, dysplastic nevi, and actinic keratoses and photodamage.  The patient was recently seen in our office for routine melanoma follow-up and total body skin exam on 4/17/24, at which time no evidence of recurrence was noted.  I emphasized the importance of continuing to perform monthly self-skin and lymph node exams using the ABCDs of monitoring moles, which were reviewed with the patient, as well as the importance of sun avoidance and sun protection with daily sunscreen use.  I will have the patient return to our office in 3 to 4 months from the date of his last visit for routine melanoma follow-up and total body skin exam, and the patient was instructed to call our office should the patient notice any new, changing, symptomatic, or otherwise concerning skin lesions before then.  The patient expressed understanding and is in agreement with this plan.

## 2024-05-06 ENCOUNTER — LAB (OUTPATIENT)
Dept: LAB | Facility: LAB | Age: 69
End: 2024-05-06
Payer: COMMERCIAL

## 2024-05-06 DIAGNOSIS — I10 PRIMARY HYPERTENSION: ICD-10-CM

## 2024-05-06 DIAGNOSIS — I42.9 CARDIOMYOPATHY, UNSPECIFIED TYPE (MULTI): ICD-10-CM

## 2024-05-06 DIAGNOSIS — E78.5 DYSLIPIDEMIA: ICD-10-CM

## 2024-05-06 DIAGNOSIS — Z79.01 WARFARIN ANTICOAGULATION: ICD-10-CM

## 2024-05-06 DIAGNOSIS — I48.21 PERMANENT ATRIAL FIBRILLATION (MULTI): ICD-10-CM

## 2024-05-06 DIAGNOSIS — I51.9 CHRONIC SYSTOLIC DYSFUNCTION OF LEFT VENTRICLE: ICD-10-CM

## 2024-05-06 DIAGNOSIS — I34.0 NONRHEUMATIC MITRAL VALVE REGURGITATION: ICD-10-CM

## 2024-05-06 DIAGNOSIS — I48.20 CHRONIC ATRIAL FIBRILLATION (MULTI): ICD-10-CM

## 2024-05-06 DIAGNOSIS — I48.91 UNSPECIFIED ATRIAL FIBRILLATION (MULTI): ICD-10-CM

## 2024-05-06 DIAGNOSIS — Z87.74 HISTORY OF ATRIAL SEPTAL DEFECT: ICD-10-CM

## 2024-05-06 DIAGNOSIS — R97.20 ELEVATED PSA: ICD-10-CM

## 2024-05-06 LAB
ALBUMIN SERPL BCP-MCNC: 4.6 G/DL (ref 3.4–5)
ALP SERPL-CCNC: 108 U/L (ref 33–136)
ALT SERPL W P-5'-P-CCNC: 22 U/L (ref 10–52)
ANION GAP SERPL CALC-SCNC: 13 MMOL/L (ref 10–20)
AST SERPL W P-5'-P-CCNC: 16 U/L (ref 9–39)
BILIRUB SERPL-MCNC: 1.2 MG/DL (ref 0–1.2)
BUN SERPL-MCNC: 23 MG/DL (ref 6–23)
CALCIUM SERPL-MCNC: 9.6 MG/DL (ref 8.6–10.3)
CHLORIDE SERPL-SCNC: 105 MMOL/L (ref 98–107)
CHOLEST SERPL-MCNC: 132 MG/DL (ref 0–199)
CHOLESTEROL/HDL RATIO: 4.3
CO2 SERPL-SCNC: 24 MMOL/L (ref 21–32)
CREAT SERPL-MCNC: 0.98 MG/DL (ref 0.5–1.3)
EGFRCR SERPLBLD CKD-EPI 2021: 85 ML/MIN/1.73M*2
ERYTHROCYTE [DISTWIDTH] IN BLOOD BY AUTOMATED COUNT: 15 % (ref 11.5–14.5)
GLUCOSE SERPL-MCNC: 75 MG/DL (ref 74–99)
HCT VFR BLD AUTO: 42.7 % (ref 41–52)
HDLC SERPL-MCNC: 30.7 MG/DL
HGB BLD-MCNC: 14.6 G/DL (ref 13.5–17.5)
INR PPP: 1.7 (ref 0.9–1.1)
LDLC SERPL CALC-MCNC: 86 MG/DL
MAGNESIUM SERPL-MCNC: 2.11 MG/DL (ref 1.6–2.4)
MCH RBC QN AUTO: 32.2 PG (ref 26–34)
MCHC RBC AUTO-ENTMCNC: 34.2 G/DL (ref 32–36)
MCV RBC AUTO: 94 FL (ref 80–100)
NON HDL CHOLESTEROL: 101 MG/DL (ref 0–149)
NRBC BLD-RTO: 0 /100 WBCS (ref 0–0)
PLATELET # BLD AUTO: 211 X10*3/UL (ref 150–450)
POTASSIUM SERPL-SCNC: 4.5 MMOL/L (ref 3.5–5.3)
PROT SERPL-MCNC: 6.9 G/DL (ref 6.4–8.2)
PROTHROMBIN TIME: 19.5 SECONDS (ref 9.8–12.8)
PSA SERPL-MCNC: 5.4 NG/ML
RBC # BLD AUTO: 4.54 X10*6/UL (ref 4.5–5.9)
SODIUM SERPL-SCNC: 137 MMOL/L (ref 136–145)
TRIGL SERPL-MCNC: 79 MG/DL (ref 0–149)
VLDL: 16 MG/DL (ref 0–40)
WBC # BLD AUTO: 6.7 X10*3/UL (ref 4.4–11.3)

## 2024-05-06 PROCEDURE — 83735 ASSAY OF MAGNESIUM: CPT

## 2024-05-06 PROCEDURE — 85610 PROTHROMBIN TIME: CPT

## 2024-05-06 PROCEDURE — 36415 COLL VENOUS BLD VENIPUNCTURE: CPT

## 2024-05-06 PROCEDURE — 80053 COMPREHEN METABOLIC PANEL: CPT

## 2024-05-06 PROCEDURE — 85027 COMPLETE CBC AUTOMATED: CPT

## 2024-05-06 PROCEDURE — 80061 LIPID PANEL: CPT

## 2024-05-06 PROCEDURE — 84153 ASSAY OF PSA TOTAL: CPT

## 2024-05-07 ENCOUNTER — HOSPITAL ENCOUNTER (EMERGENCY)
Facility: HOSPITAL | Age: 69
Discharge: HOME | End: 2024-05-07
Attending: EMERGENCY MEDICINE
Payer: COMMERCIAL

## 2024-05-07 VITALS
BODY MASS INDEX: 30.8 KG/M2 | WEIGHT: 240 LBS | TEMPERATURE: 98.2 F | RESPIRATION RATE: 16 BRPM | SYSTOLIC BLOOD PRESSURE: 150 MMHG | OXYGEN SATURATION: 98 % | HEART RATE: 80 BPM | HEIGHT: 74 IN | DIASTOLIC BLOOD PRESSURE: 82 MMHG

## 2024-05-07 DIAGNOSIS — H43.392 VITREOUS FLOATERS OF LEFT EYE: Primary | ICD-10-CM

## 2024-05-07 PROCEDURE — 99283 EMERGENCY DEPT VISIT LOW MDM: CPT | Mod: 25

## 2024-05-07 PROCEDURE — 76512 OPH US DX B-SCAN: CPT | Performed by: EMERGENCY MEDICINE

## 2024-05-07 ASSESSMENT — PAIN DESCRIPTION - PROGRESSION: CLINICAL_PROGRESSION: NOT CHANGED

## 2024-05-07 ASSESSMENT — VISUAL ACUITY
OD: 20/100
OU: 20/40
OS: 20/50

## 2024-05-07 ASSESSMENT — COLUMBIA-SUICIDE SEVERITY RATING SCALE - C-SSRS
6. HAVE YOU EVER DONE ANYTHING, STARTED TO DO ANYTHING, OR PREPARED TO DO ANYTHING TO END YOUR LIFE?: NO
1. IN THE PAST MONTH, HAVE YOU WISHED YOU WERE DEAD OR WISHED YOU COULD GO TO SLEEP AND NOT WAKE UP?: NO
2. HAVE YOU ACTUALLY HAD ANY THOUGHTS OF KILLING YOURSELF?: NO

## 2024-05-07 ASSESSMENT — PAIN - FUNCTIONAL ASSESSMENT: PAIN_FUNCTIONAL_ASSESSMENT: 0-10

## 2024-05-07 ASSESSMENT — LIFESTYLE VARIABLES
HAVE YOU EVER FELT YOU SHOULD CUT DOWN ON YOUR DRINKING: NO
HAVE PEOPLE ANNOYED YOU BY CRITICIZING YOUR DRINKING: NO
EVER HAD A DRINK FIRST THING IN THE MORNING TO STEADY YOUR NERVES TO GET RID OF A HANGOVER: NO
EVER FELT BAD OR GUILTY ABOUT YOUR DRINKING: NO
TOTAL SCORE: 0

## 2024-05-08 NOTE — ED PROVIDER NOTES
HPI   Chief Complaint   Patient presents with    Eye Problem     Pt seeing 'floaters and spider webs' in the upper left corner of eye. No flashes of light or changes in vision.       HPI     HISTORY OF PRESENT ILLNESS:  Patient is a 68-year-old male presents to the emergency department with eye vision blurriness.  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  He states that starting this evening for 5 minutes prior to ED arrival that he started to have a spiderweb that covered his eye.  He has overall become better.  There is no eye pain associated with this.  There is no visual acuity that is decreased based on this.  The patient is not diabetic.    Past Medical History: A-fib on Eliquis, hypertension, hyperlipidemia, mitral valve regurgitation      __________________________________________________________  PHYSICAL EXAM:    Appearance: Alert, oriented , cooperative   Skin: Intact,  dry skin, no lesions, rash, petechiae or purpura.   Eyes: PERRLA, EOMs intact,  Conjunctiva pink with no redness or exudates.  Under fluorescein exam, no uptake.  HENT: Normocephalic, atraumatic. Nares patent   Neck: Supple. Trachea at midline.   Pulmonary: Lung sounds are clear bilaterally.  There is no rales, rhonchi, or wheezing.  Cardiac: Regular rate and rhythm, no rubs, murmurs, or gallops. No JVD,   Abdomen: Abdomen is soft, nontender, and nondistended.  No palpable organomegaly.  No rebound or guarding.  No CVA tenderness. Nonsurgical abdomen  Genitourinary: Exam deferred.  Musculoskeletal: no edema, pain, cyanosis, or deformity in extremities. Pulses full and equal.   Neurological:  Cranial nerves are grossly intact, grossly normal sensation, no weakness, no focal findings identified.    __________________________________________________________  MEDICAL DECISION MAKING:    Patient was seen and examined. Differential diagnosis for vision floaters in the left eye includes retinal detachment, vitreous hemorrhage,  foreign body.Patient visual acuity was measured 20/40 bilaterally, 20/50 in the left eye.  Point-of-care ultrasound did not show signs of obvious retinal detachment or vitreous detachment.  I did perform eye pressures which were 17 and 18 upon multiple average to test.  Patient also did have eye pain consistent with glaucoma.  Patient did not have a foreign body.  No corneal uptake on fluorescein stain.  At this point with just vision blurriness, did not feel the patient required emergent ophthalmology transfer.  Patient felt comfortable being discharged.  He was given return precautions.  He was advised to follow-up with his ophthalmologist.  He was also provided another ophthalmologist for follow-up.  Patient verbalized understanding, agreed to make and patient was discharged home.        Tonny Simmons  Emergency Medicine               Bryan Coma Scale Score: 15                     Patient History   Past Medical History:   Diagnosis Date    ASD (atrial septal defect) ([facility])     Cardiomyopathy (Multi)     Chronic atrial fibrillation with RVR (Multi)     Dyslipidemia     Fatigue     Hypertension     Mitral valve regurgitation     Other specified health status     No pertinent past medical history    Personal history of other diseases of the circulatory system     History of congestive heart failure    Personal history of other diseases of the circulatory system     History of atrial fibrillation    Personal history of other diseases of the circulatory system     History of hypertension    Personal history of other endocrine, nutritional and metabolic disease     History of hypercholesterolemia    Warfarin anticoagulation      Past Surgical History:   Procedure Laterality Date    APPENDECTOMY  10/12/2015    Appendectomy    CARDIAC SURGERY  10/12/2015    Heart Surgery    OTHER SURGICAL HISTORY  10/12/2015    Repair Of Congenital Atrial Septal Defect    OTHER SURGICAL HISTORY  10/12/2015    Mitral Valve  Paravalvular Leak Repair    OTHER SURGICAL HISTORY  10/12/2015    Hernia Repair Inguinal Unilateral    OTHER SURGICAL HISTORY  12/30/2020    Lumbar laminectomy    OTHER SURGICAL HISTORY  06/20/2019    Prostate needle biopsy     Family History   Problem Relation Name Age of Onset    No Known Problems Mother      Heart attack Father       Social History     Tobacco Use    Smoking status: Never    Smokeless tobacco: Never   Substance Use Topics    Alcohol use: Not Currently    Drug use: Never       Physical Exam   ED Triage Vitals [05/07/24 2035]   Temperature Heart Rate Respirations BP   36.8 °C (98.2 °F) 80 16 150/82      Pulse Ox Temp Source Heart Rate Source Patient Position   98 % Temporal -- --      BP Location FiO2 (%)     -- --       Physical Exam    ED Course & MDM   Diagnoses as of 05/08/24 0020   Vitreous floaters of left eye       Medical Decision Making      Procedure    Performed by: Tonny Simmons DO  Authorized by: Tonny Simmons DO    Procedure: Ocular Ultrasound    Findings:  Vitreous: The vitreous was identified and NO abnormalities were visualized.  Retina: The retina was visualized and there was NO retinal detachment.        Impression:  Ocular: The ocular US exam was NEGATIVE for abnormalities as described.           Tonny Simmons DO  05/08/24 0022

## 2024-05-09 ENCOUNTER — ANTICOAGULATION - WARFARIN VISIT (OUTPATIENT)
Dept: PHARMACY | Facility: HOSPITAL | Age: 69
End: 2024-05-09
Payer: COMMERCIAL

## 2024-05-09 DIAGNOSIS — I48.91 ATRIAL FIBRILLATION, UNSPECIFIED TYPE (MULTI): Primary | ICD-10-CM

## 2024-05-10 NOTE — PROGRESS NOTES
I received a lab result from 5.6.24 with an INR of 1.7. His INR has previously been stable. He reports taking his warfarin as instructed and reports no missed doses. He reports no others changes in medications, no signs or symptoms of bleeding and no diet changes. He has been doing some greens occasionally like normal. He did mention he did some spinach a few days prior to testing. This is not one of his typical greens.  Given his INR is low at 1.7, we will boost today with 5mg and then continue his weekly regimen of 2.5mg on MWF and 1.25mg all other days. We will recheck in INR in 2 weeks.

## 2024-05-10 NOTE — PATIENT INSTRUCTIONS
Your INR was low at 1.7 (goal 2-3). We will have you boost today with 5mg and then continue your weekly regimen of 2.5mg MWF and 1.25 mg all other days. We will retest in 2 weeks.    If any questions arise do not hesitate to call us at [phone] M-F from 8:30am-4:30pm or at   [phone] after 5pm or on the weekends. Continue to monitor for any excess bleeding or bruising, especially for blood in your stool.

## 2024-05-15 ENCOUNTER — PROCEDURE VISIT (OUTPATIENT)
Dept: DERMATOLOGY | Facility: CLINIC | Age: 69
End: 2024-05-15
Payer: COMMERCIAL

## 2024-05-15 VITALS — DIASTOLIC BLOOD PRESSURE: 77 MMHG | SYSTOLIC BLOOD PRESSURE: 122 MMHG | HEART RATE: 49 BPM

## 2024-05-15 DIAGNOSIS — C44.41 BASAL CELL CARCINOMA (BCC) OF SKIN OF NECK: ICD-10-CM

## 2024-05-15 PROCEDURE — 99214 OFFICE O/P EST MOD 30 MIN: CPT | Performed by: DERMATOLOGY

## 2024-05-15 PROCEDURE — 17311 MOHS 1 STAGE H/N/HF/G: CPT | Performed by: DERMATOLOGY

## 2024-05-15 PROCEDURE — 13132 CMPLX RPR F/C/C/M/N/AX/G/H/F: CPT | Performed by: DERMATOLOGY

## 2024-05-15 NOTE — PROGRESS NOTES
Office Visit Note  Date: 5/15/2024  Surgeon:  Manpreet Gonzalez MD  Office Location: [address]   [facility] [address]  [facility] [address]  Dept: [phone]  Dept Fax: [phone]  Referring Provider: Brendan Cobb MD  [address] [place] [address]   [facility] [address]  [place] [id number]    Subjective   Raman Lawrence is a 68 y.o. male who presents for the following: MOHS Surgery    According to the patient, the lesion has been present for approximately 1 month at the time of diagnosis.  The lesion is not causing symptoms.  The lesion has not been treated previously.    The patient does not have a pacemaker / defibrillator.  The patient does not have a heart valve / joint replacement.    The patient is on blood thinners.  The patient does not have a history of hepatitis B or C.  The patient does not have a history of HIV.  The patient does not have a history of immunosuppression (e.g. organ transplantation, malignancy, medications)    Review of Systems:  No other skin or systemic complaints other than what is documented elsewhere in the note.    MEDICAL HISTORY: clinically relevant history including significant past medical history, medications and allergies was reviewed and documented in Epic.    Objective   Well appearing patient in no apparent distress; mood and affect are within normal limits.  Vital signs: See record.  Noted on the Left Lateral Posterior Neck  Is a 1.0 x 1.2 cm scar        The patient confirmed the identified site.    Discussion:  The nature of the diagnosis was explained. The lesion is a skin cancer.  It has a risk of local growth and distant spread. The condition is associated with sun exposure.  Warning signs of non-melanoma skin cancer discussed. Patient was instructed to perform monthly self skin examination.  We recommended that the patient have regular full skin exams given an increased risk of subsequent skin cancers. The patient was instructed to  use sun protective behaviors including use of broad spectrum sunscreens and sun protective clothing to reduce risk of skin cancers.      Risks, benefits, side effects of Mohs surgery were discussed with patient and the patient voiced understanding.  It was explained that even though the cure rate of Mohs is very high it is not 100%. Risks of surgery including but not limited to bleeding, infection, numbness, nerve damage, and scar were reviewed.  Discussion included wound care requirements, activity restrictions, likely scar outcome and time to heal.     After Mohs surgery, the defect may need to be repaired surgically and the scar may be longer than the original lesion.  Reconstruction options, risks, and benefits were reviewed including second intention healing, linear repair (4-1 ratio was explained), local flaps, skin grafts, cartilage grafts and interpolation flaps (the need for multiple surgeries was explained). Possible outcomes were reviewed including likely scar appearance, failure of flap survival, infection, bleeding and the need for revision surgery.     The pathology was reviewed, the photograph was reviewed, and the referring physician's note was reviewed.    Patient elected for Mohs surgery.    The patient has a basal cell carcinoma.  The pathology was reviewed, the photograph was reviewed, and the referring physicians note was reviewed.  Multiple treatment options including mohs surgery (which has moderate risk of morbidity) were reviewed.    Medical Decision Making  Column 1- Basal Cell Carcinoma (1 acute uncomplicated illness- Low)  Column 2- 3 tests reviewed (pathology, photograph, refering physician notes- Moderate)  Column 3- Modertate risk of morbidity from additional treatment- mohs surgery- Moderate)    Overall Moderate MDM

## 2024-05-15 NOTE — PROGRESS NOTES
Mohs Surgery Operative Note    Date of Surgery:  5/15/2024  Surgeon:  Manpreet Gonzalez MD  Office Location: [address]  [facility] [address]  Dept: [phone]  Dept Fax: [phone]  Referring Provider: Brendan Cobb MD  [address] [place] [address] Dr Keila Reynagaands, [address] [address]      Assessment/Plan   Pre-procedure:   Obtained informed consent: written from patient  The surgical site was identified and confirmed with the patient.     Intra-operative:   Audible time out called at : 09:00 AM 05/15/24  by: Susana Key RN   Verified patient name, birthdate, site, specimen bottle label & requisition.    The planned procedure(s) was again reviewed with the patient. The risks of bleeding, infection, nerve damage and scarring were reviewed. Written authorization was obtained. The patient identity, surgical site, and planned procedure(s) were verified. The provider acted as both surgeon and pathologist.     Basal cell carcinoma (BCC) of skin of neck  Left Lateral Posterior Neck    Mohs surgery    Consent obtained: written    Universal Protocol:  Procedure explained and questions answered to patient or proxy's satisfaction: Yes    Test results available and properly labeled: Yes    Pathology report reviewed: Yes    External notes reviewed: Yes    Photo or diagram used for site identification: Yes    Site/side marked: Yes    Slide independently reviewed by Mohs surgeon: Yes    Immediately prior to procedure a time out was called: Yes    Patient identity confirmed: verbally with patient  Preparation: Patient was prepped and draped in usual sterile fashion      Anticoagulation:  Is the patient taking prescription anticoagulant and/or aspirin prescribed/recommended by a physician? Yes    Was the anticoagulation regimen changed prior to Mohs? No      Anesthesia:  Anesthesia method: local infiltration  Local anesthetic: lidocaine 1% WITH epi    Procedure  Details:  Biopsy accession number: [id number]  Date of biopsy: 4/17/2024  Pre-Op diagnosis: basal cell carcinoma  BCC subtype: nodular  Surgery side: left  Surgical site (from skin exam): Left Lateral Posterior Neck  Pre-operative length (cm): 1  Pre-operative width (cm): 1.2  Indications for Mohs surgery: anatomic location where tissue conservation is critical  Previously treated? No      Micrographic Surgery Details:  Post-operative length (cm): 1.2  Post-operative width (cm): 1.4  Number of Mohs stages: 1    Stage 1     Comments: The patient was brought into the operating room and placed in the procedure chair in the appropriate position.  The area positive by previous biopsy was identified and confirmed with the patient. The area of clinically obvious tumor was debulked using a curette and/or scalpel as needed. An incision was made following the Mohs approach through the skin. The specimen was taken to the lab, divided into 2 piece(s) and appropriately chromacoded and processed.     Tumor features identified on Mohs section: no tumor identified    Depth of defect: subcutaneous fat    Patient tolerance of procedure: tolerated well, no immediate complications    Reconstruction:  Was the defect reconstructed? Yes    Was reconstruction performed by the same Mohs surgeon? Yes    Setting of reconstruction: outpatient office  When was reconstruction performed? same day  Type of reconstruction: linear  Linear reconstruction: complex  Length of linear repair (cm): 3.4  Subcutaneous Layers (Deep Stitches)   Suture size:  4-0  Suture type:  Vicryl  Stitches:  Buried vertical mattress  Fine/surface layer approximation (top stitches)   Epidermal/Superficial suture size:  5-0  Epidermal/Superficial suture type:  Fast-absorbing gut  Stitches: simple running    Hemostasis achieved with: electrodesiccation  Outcome: patient tolerated procedure well with no complications    Post-procedure details: sterile dressing applied and  wound care instructions given    Dressing type: petrolatum, pressure dressing and Telfa pad        Complex Linear Repair - Wide Undermining:  Given the location and size of the defect, it was determined that a complex layered linear closure was required to restore normal anatomy and function. The repair was considered complex because extensive undermining was required and performed. The amount of undermining performed was greater than the maximum width of the defect as measured perpendicular to the closure line along at least one entire edge of the defect. Standing cutaneous cones were removed using Burow's triangles. The wound edges were brought into close approximation with buried vertical mattress sutures. The remainder of the wound was then closed with epidermal top sutures.      The final repair measured 3.4 cm              Wound care was discussed, and the patient was given written post-operative wound care instructions.      The patient will follow up with Manpreet Gonzalez MD as needed for any post operative problems or concerns, and will follow up with their primary dermatologist as scheduled.

## 2024-05-16 ENCOUNTER — OFFICE VISIT (OUTPATIENT)
Dept: UROLOGY | Facility: CLINIC | Age: 69
End: 2024-05-16
Payer: COMMERCIAL

## 2024-05-16 DIAGNOSIS — N40.0 BENIGN PROSTATIC HYPERPLASIA, UNSPECIFIED WHETHER LOWER URINARY TRACT SYMPTOMS PRESENT: ICD-10-CM

## 2024-05-16 DIAGNOSIS — R97.20 ELEVATED PSA: Primary | ICD-10-CM

## 2024-05-16 LAB
POC BILIRUBIN, URINE: NEGATIVE
POC BLOOD, URINE: NEGATIVE
POC GLUCOSE, URINE: NEGATIVE MG/DL
POC KETONES, URINE: NEGATIVE MG/DL
POC LEUKOCYTES, URINE: NEGATIVE
POC NITRITE,URINE: NEGATIVE
POC PH, URINE: 5.5 PH
POC PROTEIN, URINE: NEGATIVE MG/DL
POC SPECIFIC GRAVITY, URINE: 1.02
POC UROBILINOGEN, URINE: 0.2 EU/DL

## 2024-05-16 PROCEDURE — 99213 OFFICE O/P EST LOW 20 MIN: CPT | Performed by: UROLOGY

## 2024-05-16 PROCEDURE — 1159F MED LIST DOCD IN RCRD: CPT | Performed by: UROLOGY

## 2024-05-16 PROCEDURE — 81002 URINALYSIS NONAUTO W/O SCOPE: CPT | Performed by: UROLOGY

## 2024-05-16 NOTE — PROGRESS NOTES
05/16/2024  Voiding well, no medications    Patient has no nausea, no vomiting, no fever.    MOLLY: Deferred    PSA: Elevated but stable    We discussed benign prostate hypertrophy with mild voiding symptom  We discussed elevated PSA, stable PSA  All the questions were answered, the patient expressed understanding and agreed to the plan.    Impression  BPH  Elevated PSA, negative biopsy x 2    Plan  Yearly MOLLY and PSA  Watch voiding      Patient of Dr. Blancas.   Chief Complaint   Patient presents with    Elevated PSA     Patient here for 6 month follow-up. Negative prostate biopsy in 2019; he has had 2 negative biopsies. Denies family hx of prostate cancer. Weight is stable.     Benign Prostatic Hypertrophy     Denies nocturia. Voiding well.         Physical Exam     TODAYS LAB RESULTS:    PSA 05/06/2024  5.40    POC Glucose, Urine  NEGATIVE mg/dl NEGATIVE   POC Bilirubin, Urine  NEGATIVE NEGATIVE   POC Ketones, Urine  NEGATIVE mg/dl NEGATIVE   POC Specific Gravity, Urine  1.005 - 1.035 1.025   POC Blood, Urine  NEGATIVE NEGATIVE   POC PH, Urine  No Reference Range Established PH 5.5   POC Protein, Urine  NEGATIVE, 30 (1+) mg/dl NEGATIVE   POC Urobilinogen, Urine  0.2, 1.0 EU/DL 0.2   Poc Nitrite, Urine  NEGATIVE NEGATIVE   POC Leukocytes, Urine  NEGATIVE NEGATIVE     ASSESSMENT&PLAN:      IMPRESSIONS:       HPI:   Elevated PSA  Select MDX 5/2023 26% chance csPCA  No new  symptoms   No MRI contraindications            Lab Results   Component Value Date     PSA 4.93 (H) 11/11/2023     PSA 5.26 (H) 04/28/2023     PSA 4.75 (H) 06/03/2022      ED -      Last visit:  Select MDX for further risk stratification  Defers MRI for now  Will call with urine biomarker results - if high risk would consider MRI  FUV in 6 months with PSA prior      5/24/23  1. Elevated PSA   chronic problem that is worsening   6/3/22 PSA was 4.75.  8/2021 PSA 4.19  3/2021 PSA 3.88  Negative biopsy 2019  No family history   No chemical exposures    No MRI contraindications   Father passed at 55, mother passed at 91 3 years ago      2. ED  No new cardiac symptoms  has not tried sildenafil due to lack of partner   On coumadin for A-fib      9/1/22  1. Elevated PSA  6/3/22 PSA was 4.75.  8/2021 PSA 4.19  3/2021 PSA 3.88  Negative biopsy 2019  No family history   No chemical exposures   No MRI contraindications      2. ED  Can sometimes penetrate, more difficult   No MI history  can complete 4 METs  On coumadin for A-fib            Medical History        Past Medical History:   Diagnosis Date    ASD (atrial septal defect)      Cardiomyopathy (CMS/HCC)      Chronic atrial fibrillation with RVR (CMS/HCC)      Dyslipidemia      Fatigue      Hypertension      Mitral valve regurgitation      Other specified health status       No pertinent past medical history    Personal history of other diseases of the circulatory system       History of congestive heart failure    Personal history of other diseases of the circulatory system       History of atrial fibrillation    Personal history of other diseases of the circulatory system       History of hypertension    Personal history of other endocrine, nutritional and metabolic disease       History of hypercholesterolemia    Warfarin anticoagulation           Surgical History         Past Surgical History:   Procedure Laterality Date    APPENDECTOMY   10/12/2015     Appendectomy    CARDIAC SURGERY   10/12/2015     Heart Surgery    OTHER SURGICAL HISTORY   10/12/2015     Repair Of Congenital Atrial Septal Defect    OTHER SURGICAL HISTORY   10/12/2015     Mitral Valve Paravalvular Leak Repair    OTHER SURGICAL HISTORY   10/12/2015     Hernia Repair Inguinal Unilateral    OTHER SURGICAL HISTORY   12/30/2020     Lumbar laminectomy    OTHER SURGICAL HISTORY   06/20/2019     Prostate needle biopsy         Social History               Socioeconomic History    Marital status: Single       Spouse name: Not on file    Number of  children: Not on file    Years of education: Not on file    Highest education level: Not on file   Occupational History    Not on file   Tobacco Use    Smoking status: Never    Smokeless tobacco: Never   Substance and Sexual Activity    Alcohol use: Not Currently    Drug use: Never    Sexual activity: Not on file   Other Topics Concern    Not on file   Social History Narrative    Not on file      Social Determinants of Health      Financial Resource Strain: Not on file   Food Insecurity: Not on file   Transportation Needs: Not on file   Physical Activity: Not on file   Stress: Not on file   Social Connections: Not on file   Intimate Partner Violence: Not on file   Housing Stability: Not on file              Current Outpatient Medications   Medication Instructions    acetaminophen (Tylenol) 325 mg tablet 2 tablet EVERY 6 HOURS (route: oral)    atorvastatin (Lipitor) 10 mg tablet 1 tablet, oral, Daily    clindamycin (Cleocin T) 1 % lotion 1 Application    cyclobenzaprine (Flexeril) 10 mg tablet 1 tablet 3 TIMES DAILY (route: oral)    dilTIAZem CD (CARDIZEM CD) 240 mg, oral, Daily    Jantoven 2.5 mg tablet take 1/2 to 1 tablet by mouth once daily as directed    lisinopril 5 mg, oral, Daily    metoprolol succinate XL (TOPROL-XL) 25 mg, oral, Daily    metroNIDAZOLE (Metrocream) 0.75 % cream 0           Allergies   Allergen Reactions    Halothane Unknown            Physical Exam:  General: Alert, cooperative, no acute distress  Eyes: Sclera clear  Cardiac: Extremities are warm and well perfused  Lungs: Breathing non-labored. Speaking in clear and complete sentences.  MSK: Ambulatory with steady gait   Neuro: Alert and oriented to person, place, and time  Psych: Normal mood and affect  Skin: No obvious lesions or rashes     Assessment and Plan:     1. Elevated PSA  Discussed risks and benefits of prostate MRI, defers for now  PSA stable  Defers biopsy  FUV 6 months PSA and MOLLY prior     PSA  5/6/2024 5.4  11/11/2023  4.93  4/28/23 5.26  6/3/22 PSA was 4.75.  8/2021 PSA 4.19  3/2021 PSA 3.88      Prostate biopsy  2020 negative?  2019 negative?

## 2024-05-28 ENCOUNTER — LAB (OUTPATIENT)
Dept: LAB | Facility: LAB | Age: 69
End: 2024-05-28
Payer: COMMERCIAL

## 2024-05-28 DIAGNOSIS — I48.91 ATRIAL FIBRILLATION, UNSPECIFIED TYPE (MULTI): ICD-10-CM

## 2024-05-28 LAB
INR PPP: 2.1 (ref 0.9–1.1)
PROTHROMBIN TIME: 24.3 SECONDS (ref 9.8–12.8)

## 2024-05-28 PROCEDURE — 85610 PROTHROMBIN TIME: CPT

## 2024-05-28 PROCEDURE — 36415 COLL VENOUS BLD VENIPUNCTURE: CPT

## 2024-05-29 ENCOUNTER — ANTICOAGULATION - WARFARIN VISIT (OUTPATIENT)
Dept: PHARMACY | Facility: HOSPITAL | Age: 69
End: 2024-05-29
Payer: COMMERCIAL

## 2024-05-29 DIAGNOSIS — I48.91 ATRIAL FIBRILLATION, UNSPECIFIED TYPE (MULTI): Primary | ICD-10-CM

## 2024-05-29 NOTE — PATIENT INSTRUCTIONS
Your INR was in range at 2.1 (goal 2-3). We will have you continue your weekly regimen of 2.5mg MWF and 1.25 mg all other days. We will retest in 4 weeks.    If any questions arise do not hesitate to call us at [phone] M-F from 8:30am-4:30pm or at   [phone] after 5pm or on the weekends. Continue to monitor for any excess bleeding or bruising, especially for blood in your stool.

## 2024-05-29 NOTE — PROGRESS NOTES
I received a lab result from 5.28.24 with an INR of 2.1. His INR has previously been stable. He reports taking his warfarin as instructed and reports no missed doses. He reports no others changes in medications, no signs or symptoms of bleeding and no diet changes. He has been doing some greens occasionally like normal.  Given his INR is in range at 2.1, we will continue his weekly regimen of 2.5mg on MWF and 1.25mg all other days. We will recheck in INR in 4 weeks.    
[facility]...

## 2024-06-27 ENCOUNTER — APPOINTMENT (OUTPATIENT)
Dept: DERMATOLOGY | Facility: CLINIC | Age: 69
End: 2024-06-27
Payer: COMMERCIAL

## 2024-06-28 ENCOUNTER — APPOINTMENT (OUTPATIENT)
Dept: DERMATOLOGY | Facility: CLINIC | Age: 69
End: 2024-06-28
Payer: COMMERCIAL

## 2024-06-28 DIAGNOSIS — C44.622 SQUAMOUS CELL CANCER OF SKIN OF RIGHT FOREARM: Primary | ICD-10-CM

## 2024-06-28 PROCEDURE — 11602 EXC TR-EXT MAL+MARG 1.1-2 CM: CPT | Performed by: DERMATOLOGY

## 2024-06-28 PROCEDURE — 12031 INTMD RPR S/A/T/EXT 2.5 CM/<: CPT | Performed by: DERMATOLOGY

## 2024-06-28 NOTE — PROGRESS NOTES
Excision Operative Note    Date of Surgery:  6/28/2024  Surgeon:  Kayla Mujica MD  Office Location: [address]  [facility] [address]  Dept: [phone]  Dept Fax: [phone]  Referring Provider: Brendan Cobb MD  [address] [place] [address]   [facility] [address]  [place] [id number]    Subjective   Raman Lawrence is a 68 y.o. male who presents for the following: Excision for squamous cell carcinoma.    According to the patient, the lesion has been present for approximately 6 months at the time of diagnosis.  The lesion is not causing symptoms.  The lesion has not been treated previously.    The patient does not have a pacemaker / defibrillator.  The patient does not have a heart valve / joint replacement.    The patient is on blood thinners.   The patient does have a history of hepatitis B or C.  The patient does not have a history of HIV.  The patient does have a history of immunosuppression (e.g. organ transplantation, malignancy, medications)    Is it okay to leave a phone message with results?yes      The following portions of the chart were reviewed this encounter and updated as appropriate:         Assessment/Plan   Pre-procedure:   Obtained informed consent: written from patient  The surgical site was identified and confirmed with the patient.     Intra-operative:   Audible time out called at : 1:52 PM 06/28/24  by: Zuri Saldana RN  Verified patient name, birthdate, site, specimen bottle label & requisition.    The planned procedure(s) was again reviewed with the patient. The risks of bleeding, infection, nerve damage and scarring were reviewed. The patient identity, surgical site, and planned procedure(s) were verified.     Biopsy Accession Number: [id number]  Squamous cell carcinoma of skin  Right Lateral Distal Forearm    Skin excision    Lesion length (cm):  1  Lesion width (cm):  0.8  Margin per side (cm):  0.5  Total excision diameter  (cm):  2  Informed consent: discussed and consent obtained    Timeout: patient name, date of birth, surgical site, and procedure verified    Procedure prep:  Patient prepped in sterile fashion  Anesthesia: the lesion was anesthetized in a standard fashion    Anesthetic:  1% lidocaine w/ epinephrine 1-100,000 local infiltration  Instrument used: #15 blade    Hemostasis achieved with: electrodesiccation    Outcome: patient tolerated procedure well with no complications    Post-procedure details: sterile dressing applied and wound care instructions given    Dressing type: pressure dressing    Additional details:  The nature of the diagnosis was explained. The lesion is a skin cancer.  It is locally aggressive but has a very low to non-existent risk of spreading.  The condition is associated with sun exposure.  Warning signs of non-melanoma skin cancer discussed. Patient was instructed to perform monthly self skin examination.  We recommended that the patient have regular full skin exams given an increased risk of subsequent skin cancers. The patient was instructed to use sun protective behaviors including use of broad spectrum sunscreens and sun protective clothing to reduce risk of skin cancers.  Excision was discussed with the patient. The risks, benefits and potential adverse effects were reviewed. Discussion included but was not limited to the cure rate, relative cost, wound care requirements, activity restrictions, likely scar outcome and time to heal were reviewed. It was explained that the scar would be longer than the original lesion.  The patient elected to proceed with exicision today.    Skin repair  Complexity:  Intermediate  Final length (cm):  2  Informed consent: discussed and consent obtained    Timeout: patient name, date of birth, surgical site, and procedure verified    Procedure prep:  Patient prepped in sterile fashion  Anesthesia: the lesion was anesthetized in a standard fashion    Anesthetic:   1% lidocaine w/ epinephrine 1-100,000 local infiltration  Reason for type of repair: preserve normal anatomical and functional relationships    Undermining: edges undermined    Subcutaneous layers (deep stitches):   Suture size:  3-0  Suture type: Vicryl (polyglactin 910)    Stitches:  Buried vertical mattress  Fine/surface layer approximation (top stitches):   Suture size:  5-0  Suture type: fast-absorbing plain gut    Stitches: simple running    Hemostasis achieved with: electrodesiccation  Outcome: patient tolerated procedure well with no complications    Post-procedure details: sterile dressing applied and wound care instructions given    Dressing type: pressure dressing      Specimen 1 - Dermatopathology- DERM LAB  Differential Diagnosis: SCCIS  Check Margins Yes  Comments:    Dermpath Lab: Routine Histopathology (formalin-fixed tissue)      Intermediate Linear Repair:  Given the location and size of the defect, it was determined that an intermediate layered linear closure was required to restore normal anatomy and function. The repair is an intermediate closure as two layers of sutures were required. The defect was undermined extensively at the level of the subcutaneous plane. Standing cutaneous cones were removed using Burow's triangles. The wound edges were brought into close approximation with buried vertical mattress sutures. The remainder of the wound was then closed with epidermal top sutures.    The final repair measured 2 cm    Wound care was discussed, and the patient was given written post-operative wound care instructions.      The patient will follow up with Kayla Mujica MD as needed for any post operative problems or concerns, and will follow up with their primary dermatologist as scheduled.

## 2024-06-28 NOTE — PROGRESS NOTES
Office Visit Note  Date: 6/28/2024  Surgeon:  Kayla Mujica MD  Office Location: [address]  [facility] [address]  Dept: [phone]  Dept Fax: [phone]  Referring Provider: Brendan Cobb MD  [address] [place] [address]   Keila Richie Clayton, [facility] [address]  [place] [id number]    Subjective   Raman Lawrence is a 68 y.o. male who presents for the following: Excision    According to the patient, the lesion has been present for approximately greater than 1 year at the time of diagnosis.  The lesion is not causing symptoms.  The lesion has not been treated previously.    The patient does not have a pacemaker / defibrillator.  The patient does not have a heart valve / joint replacement.    The patient is on blood thinners.  The patient does have a history of hepatitis B or C.  The patient does not have a history of HIV.  The patient does have a history of immunosuppression (e.g. organ transplantation, malignancy, medications)    Review of Systems:  No other skin or systemic complaints other than what is documented elsewhere in the note.    MEDICAL HISTORY: clinically relevant history including significant past medical history, medications and allergies was reviewed and documented in Epic.    Objective   Well appearing patient in no apparent distress; mood and affect are within normal limits.  Vital signs: See record.  Noted on the Right Lateral Distal Forearm  Is a *** x *** cm scar        The patient confirmed the identified site.    Discussion:  The nature of the diagnosis was explained. The lesion is a skin cancer.  It has a risk of local growth and distant spread. The condition is associated with sun exposure.  Warning signs of non-melanoma skin cancer discussed. Patient was instructed to perform monthly self skin examination.  We recommended that the patient have regular full skin exams given an increased risk of subsequent skin cancers. The patient was instructed to  use sun protective behaviors including use of broad spectrum sunscreens and sun protective clothing to reduce risk of skin cancers.      Risks, benefits, side effects of Mohs surgery were discussed with patient and the patient voiced understanding.  It was explained that even though the cure rate of Mohs is very high it is not 100%. Risks of surgery including but not limited to bleeding, infection, numbness, nerve damage, and scar were reviewed.  Discussion included wound care requirements, activity restrictions, likely scar outcome and time to heal.     After Mohs surgery, the defect may need to be repaired surgically and the scar may be longer than the original lesion.  Reconstruction options, risks, and benefits were reviewed including second intention healing, linear repair (4-1 ratio was explained), local flaps, skin grafts, cartilage grafts and interpolation flaps (the need for multiple surgeries was explained). Possible outcomes were reviewed including likely scar appearance, failure of flap survival, infection, bleeding and the need for revision surgery.     The pathology was reviewed, the photograph was reviewed, and the referring physician's note was reviewed.    Patient elected for Mohs surgery.

## 2024-06-29 ENCOUNTER — LAB (OUTPATIENT)
Dept: LAB | Facility: LAB | Age: 69
End: 2024-06-29
Payer: COMMERCIAL

## 2024-06-29 DIAGNOSIS — I48.91 ATRIAL FIBRILLATION, UNSPECIFIED TYPE (MULTI): ICD-10-CM

## 2024-06-29 LAB
INR PPP: 1.7 (ref 0.9–1.1)
PROTHROMBIN TIME: 19.7 SECONDS (ref 9.8–12.8)

## 2024-06-29 PROCEDURE — 36415 COLL VENOUS BLD VENIPUNCTURE: CPT

## 2024-06-29 PROCEDURE — 85610 PROTHROMBIN TIME: CPT

## 2024-07-01 ENCOUNTER — ANTICOAGULATION - WARFARIN VISIT (OUTPATIENT)
Dept: PHARMACY | Facility: HOSPITAL | Age: 69
End: 2024-07-01
Payer: COMMERCIAL

## 2024-07-01 DIAGNOSIS — I48.91 ATRIAL FIBRILLATION, UNSPECIFIED TYPE (MULTI): Primary | ICD-10-CM

## 2024-07-01 NOTE — PROGRESS NOTES
I received a lab result from 6.29.24 with an INR of 1.7. He reports taking his warfarin as instructed and reports no missed doses. He reports no others changes in medications, no signs or symptoms of bleeding. He did a few extra salads in comparison to normal this past month. Given his INR is low we will boost today with 5mg and then continue his weekly regimen of 2.5mg on MWF and 1.25mg all other days. We will recheck in INR in 2 weeks. We discussed if his INR is still low at the next visit we would increase his weekly regimen.

## 2024-07-01 NOTE — PATIENT INSTRUCTIONS
Your INR was low at 1.7 (goal 2-3). We will have you boost today with 5mg and then continue your weekly regimen of 2.5mg MWF and 1.25 mg all other days. We will retest in 2 weeks.  If any questions arise do not hesitate to call us at [phone] M-F from 8:30am-4:30pm or at   [phone] after 5pm or on the weekends. Continue to monitor for any excess bleeding or bruising, especially for blood in your stool.

## 2024-07-02 NOTE — RESULT ENCOUNTER NOTE
Kit Lawrence - your results show the skin cancer has been completely removed. No further treatment required. Continue skin checks every 6-12 months with your primary dermatologist. Please call/message us with any questions/concerns.

## 2024-07-13 DIAGNOSIS — I10 PRIMARY HYPERTENSION: ICD-10-CM

## 2024-07-19 RX ORDER — LISINOPRIL 5 MG/1
5 TABLET ORAL DAILY
Qty: 90 TABLET | Refills: 1 | Status: SHIPPED | OUTPATIENT
Start: 2024-07-19

## 2024-07-19 NOTE — TELEPHONE ENCOUNTER
Called patient to see what pharmacy he wants this sent to since [facility] is closing. Wants scripts sent to [facility] in [place].

## 2024-07-20 ENCOUNTER — LAB (OUTPATIENT)
Dept: LAB | Facility: LAB | Age: 69
End: 2024-07-20
Payer: COMMERCIAL

## 2024-07-20 DIAGNOSIS — I48.91 ATRIAL FIBRILLATION, UNSPECIFIED TYPE (MULTI): ICD-10-CM

## 2024-07-20 LAB
INR PPP: 2.4 (ref 0.9–1.1)
PROTHROMBIN TIME: 27.5 SECONDS (ref 9.8–12.8)

## 2024-07-20 PROCEDURE — 85610 PROTHROMBIN TIME: CPT

## 2024-07-20 PROCEDURE — 36415 COLL VENOUS BLD VENIPUNCTURE: CPT

## 2024-07-22 ENCOUNTER — ANTICOAGULATION - WARFARIN VISIT (OUTPATIENT)
Dept: PHARMACY | Facility: HOSPITAL | Age: 69
End: 2024-07-22
Payer: COMMERCIAL

## 2024-07-22 DIAGNOSIS — I48.91 ATRIAL FIBRILLATION, UNSPECIFIED TYPE (MULTI): Primary | ICD-10-CM

## 2024-07-22 NOTE — PATIENT INSTRUCTIONS
Your INR was in range at 2.4 (goal 2-3). We will have you continue your weekly regimen of 2.5mg MWF and 1.25 mg all other days. We will retest in 4 weeks.  If any questions arise do not hesitate to call us at [phone] M-F from 8:30am-4:30pm or at   [phone] after 5pm or on the weekends. Continue to monitor for any excess bleeding or bruising, especially for blood in your stool.    Evaluate for hallucinations. Pt with hx of bipolar d/o- medication management.

## 2024-07-22 NOTE — PROGRESS NOTES
I received a lab result from 7.20.24 with an INR of 2.4. He reports taking his warfarin as instructed and reports no missed doses. He reports no others changes in medications, no signs or symptoms of bleeding. He cut back on his salads this past month. Given his INR is in range, we will continue his weekly regimen of 2.5mg on MWF and 1.25mg all other days. We will recheck in INR in 4 weeks.

## 2024-08-05 DIAGNOSIS — I48.91 ATRIAL FIBRILLATION, UNSPECIFIED TYPE (MULTI): Primary | ICD-10-CM

## 2024-08-15 ENCOUNTER — APPOINTMENT (OUTPATIENT)
Dept: DERMATOLOGY | Facility: CLINIC | Age: 69
End: 2024-08-15
Payer: COMMERCIAL

## 2024-08-15 DIAGNOSIS — L81.4 LENTIGO: ICD-10-CM

## 2024-08-15 DIAGNOSIS — L57.0 ACTINIC KERATOSIS: ICD-10-CM

## 2024-08-15 DIAGNOSIS — L57.8 DIFFUSE PHOTODAMAGE OF SKIN: ICD-10-CM

## 2024-08-15 DIAGNOSIS — L82.1 SEBORRHEIC KERATOSIS: ICD-10-CM

## 2024-08-15 DIAGNOSIS — Z85.820 PERSONAL HISTORY OF MALIGNANT MELANOMA OF SKIN: ICD-10-CM

## 2024-08-15 DIAGNOSIS — D22.5 MELANOCYTIC NEVUS OF TRUNK: ICD-10-CM

## 2024-08-15 DIAGNOSIS — D48.5 NEOPLASM OF UNCERTAIN BEHAVIOR OF SKIN: Primary | ICD-10-CM

## 2024-08-15 DIAGNOSIS — L71.9 ROSACEA: ICD-10-CM

## 2024-08-15 PROCEDURE — 99214 OFFICE O/P EST MOD 30 MIN: CPT | Performed by: DERMATOLOGY

## 2024-08-15 PROCEDURE — 17004 DESTROY PREMAL LESIONS 15/>: CPT | Performed by: DERMATOLOGY

## 2024-08-15 PROCEDURE — 11301 SHAVE SKIN LESION 0.6-1.0 CM: CPT | Performed by: DERMATOLOGY

## 2024-08-15 PROCEDURE — 1159F MED LIST DOCD IN RCRD: CPT | Performed by: DERMATOLOGY

## 2024-08-15 ASSESSMENT — DERMATOLOGY QUALITY OF LIFE (QOL) ASSESSMENT: ARE THERE EXCLUSIONS OR EXCEPTIONS FOR THE QUALITY OF LIFE ASSESSMENT: NO

## 2024-08-15 ASSESSMENT — DERMATOLOGY PATIENT ASSESSMENT
DO YOU USE A TANNING BED: NO
DO YOU HAVE ANY NEW OR CHANGING LESIONS: NO
DO YOU USE SUNSCREEN: OCCASIONALLY

## 2024-08-15 NOTE — PROGRESS NOTES
"Subjective     Raman Lawrence is a 68 y.o. male who presents for the following: Skin Exam.  He denies any new, changing, or concerning skin lesions since his last visit; no bleeding, itching, or burning lesions.      Review of Systems:  No other skin or systemic complaints other than what is documented elsewhere in the note.    The following portions of the chart were reviewed this encounter and updated as appropriate:       Skin Cancer History  Biopsy Date Type Location Status   4/17/24 BCC, Superficial Right Anterior Distal Leg Schedule Procedure  5/2/24 4/17/24 BCC Left Lateral Posterior Neck Refer Mohs/Surgeon  5/15/24     Specialty Problems    None      Past Dermatologic / Past Relevant Medical History:    - history of melanoma, lentigo maligna type with Breslow depth 0.5 mm, on right lateral forehead diagnosed by Dr. Inocencia Snyder on 8/10/21 s/p staged wide local excision with peripheral margin control (\"slow Mohs surgery\") by Dr. Fagan on 9/14/21    - history of 2 BCCs on right anterior distal leg and left lateral posterior neck both diagnosed on 4/17/24 s/p ED&C on 5/2/24 and Mohs surgery by Dr. Gonzalez on 5/15/24, respectively  - SCC in situ right lateral distal forearm diagnosed on 8/14/23 s/p wide local excision with 5-mm margins by Dr. Mujica on 6/28/24  - BCC on right lateral upper cheek diagnosed on 12/1/22 s/p Mohs surgery by Dr. Gonzalez on 3/23/23  - BCC on left proximal forearm diagnosed on 4/7/22 s/p wide local excision with 5-mm margins by Dr. Gonzalez on 7/13/22  - SCC in situ on right dorsal distal forearm diagnosed on 12/9/21 s/p wide local excision with 5-mm margins by Dr. Gonzalez on 4/13/22  - BCC on right dorsal mid-forearm diagnosed on 12/9/21 s/p Mohs surgery by Dr. Gonzalez on 3/10/22  - BCC on left temple diagnosed by Dr. Fagan on 9/14/21 s/p Mohs surgery by Dr. Fagan on 9/16/21    - AKs    - junctional dysplastic nevus with mild atypia on right upper back on 12/9/21  - " dysplastic junctional nevi in actinically damaged skin on right lateral distal arm and left medial mid forearm both diagnosed on 4/7/22 and s/p re-excision with 3-mm margins by Dr. Portillo on 9/26/22 and re-excision with 5-mm margins by Dr. Gallegos on 10/3/22, respectively  - mildly dysplastic compound nevus on left lateral mid arm diagnosed on 8/4/22  - moderately dysplastic junctional nevus with an area of more significant architectural disorder on right medial dorsal mid forearm diagnosed on 12/1/22 s/p re-excision with 3-mm margins by Dr. Gallegos on 4/3/23  - combined melanocytic nevus with features of dysplastic nevus (mild atypia) on left medial superior upper back and compound dysplastic nevus with mild atypia on left medial upper back both diagnosed on 4/6/23  - mildly dysplastic junctional nevus on right lateral inferior posterior neck diagnosed on 8/14/23  - inflamed mildly dysplastic compound nevus on right upper back diagnosed on 12/18/23  - 2 mildly dysplastic compound nevi on right lateral proximal arm and right posterior distal arm both diagnosed on 4/17/24    - reported history of BCC on left temple treated over 20 years ago with recurrence as detailed above  - reported history of multiple dysplastic nevi diagnosed by his previous outside Dermatologist, Dr. Inocencia Snyder    Family History:    No family history of melanoma or skin cancer    Social History:    The patient states he lives in [place], [place], and works as a  for [employer] at the intersection of [address], and one of his co-workers is a patient in our office as well    Allergies:  Halothane    Current Medications / CAM's:    Current Outpatient Medications:     acetaminophen (Tylenol) 325 mg tablet, 2 tablet EVERY 6 HOURS (route: oral), Disp: , Rfl:     atorvastatin (Lipitor) 10 mg tablet, Take 1 tablet (10 mg) by mouth once daily., Disp: 90 tablet, Rfl: 3    clindamycin (Cleocin T) 1 % lotion, 1 Application,  Disp: , Rfl:     dilTIAZem CD (Cardizem CD) 240 mg 24 hr capsule, Take 1 capsule (240 mg) by mouth once daily., Disp: 90 capsule, Rfl: 3    lisinopril 5 mg tablet, take 1 tablet by mouth once daily, Disp: 90 tablet, Rfl: 1    metoprolol succinate XL (Toprol-XL) 25 mg 24 hr tablet, Take 1 tablet (25 mg) by mouth once daily., Disp: 90 tablet, Rfl: 3    metroNIDAZOLE (Metrocream) 0.75 % cream, 0, Disp: , Rfl:     triamcinolone (Kenalog) 0.025 % cream, Apply twice daily to affected areas of legs (avoid face, groin, body folds) for 2 weeks, then taper to twice daily on weekends only; repeat every 6-8 weeks as needed for flares, Disp: 80 g, Rfl: 2    warfarin (Coumadin) 2.5 mg tablet, Take 1 tablet (2.5mg) by mouth on Monday, Wednesday, Friday and 1/2 tablet (1.25mg) by mouth all other days or as directed by Coumadin clinic., Disp: 60 tablet, Rfl: 3     Objective   Well appearing patient in no apparent distress; mood and affect are within normal limits.    A full examination was performed including scalp, face, eyes, ears, nose, lips, neck, chest, axillae, abdomen, back, bilateral upper extremities, and bilateral lower extremities. All findings within normal limits unless otherwise noted below.    Assessment/Plan   1. Neoplasm of uncertain behavior of skin (2)  Left Medial Distal Dorsal Forearm  8 mm pink, shiny papule           Shave removal    Lesion length (cm):  0.8  Margin per side (cm):  0  Lesion diameter (cm):  0.8  Informed consent: discussed and consent obtained    Timeout: patient name, date of birth, surgical site, and procedure verified    Procedure prep:  Patient was prepped and draped  Anesthesia: the lesion was anesthetized in a standard fashion    Anesthetic:  1% lidocaine w/ epinephrine 1-100,000 local infiltration  Instrument used: flexible razor blade    Hemostasis achieved with: aluminum chloride    Outcome: patient tolerated procedure well    Post-procedure details: sterile dressing applied and wound  care instructions given    Dressing type: bandage and petrolatum      Specimen 1 - Dermatopathology- DERM LAB  Differential Diagnosis: BCC v AK v SCCIS v BLK  Check Margins Yes/No?:    Comments:    Dermpath Lab: Routine Histopathology (formalin-fixed tissue)    Left Lateral Mid-Forearm  7 mm pink, shiny papule           Shave removal    Lesion length (cm):  0.7  Margin per side (cm):  0  Lesion diameter (cm):  0.7  Informed consent: discussed and consent obtained    Timeout: patient name, date of birth, surgical site, and procedure verified    Procedure prep:  Patient was prepped and draped  Anesthesia: the lesion was anesthetized in a standard fashion    Anesthetic:  1% lidocaine w/ epinephrine 1-100,000 local infiltration  Instrument used: flexible razor blade    Hemostasis achieved with: aluminum chloride    Outcome: patient tolerated procedure well    Post-procedure details: sterile dressing applied and wound care instructions given    Dressing type: bandage and petrolatum      Specimen 2 - Dermatopathology- DERM LAB  Differential Diagnosis: BCC v AK v SCCIS v BLK  Check Margins Yes/No?:    Comments:    Dermpath Lab: Routine Histopathology (formalin-fixed tissue)    2. Actinic keratosis (18)  Head - Anterior (Face) (18)  Scattered on the patient's scalp and face, mainly forehead, there are multiple erythematous, gritty, scaly macules     Actinic Keratoses -scattered on scalp and face.  The pre-cancerous nature of these lesions and treatment options were discussed with the patient today.  At this time, I recommend treatment with liquid nitrogen cryotherapy.  The patient expressed understanding, is in agreement with this plan, and wishes to proceed with cryotherapy today.    Destr of lesion - Head - Anterior (Face) (18)  Complexity: simple    Destruction method: cryotherapy    Informed consent: discussed and consent obtained    Lesion destroyed using liquid nitrogen: Yes    Cryotherapy cycles:  1  Outcome: patient  tolerated procedure well with no complications    Post-procedure details: wound care instructions given      3. Melanocytic nevus of trunk  Scattered on the patient's face, neck, trunk, and extremities, there are several small, round- to oval-shaped, brown-pigmented and pink-colored, symmetric, uniform-appearing macules and dome-shaped papules    Clinically benign- to slightly atypical-appearing nevi - the clinically benign- to slightly atypical-appearing nature of the patient's nevi was discussed with the patient today.  None of the patient's nevi meet threshold for biopsy today.  I emphasized the importance of performing monthly self-skin exams using the ABCDs of monitoring moles, which were reviewed with the patient today and an informational hand-out provided.  I also emphasized the importance of sun avoidance and sun protection with daily sunscreen use.  The patient expressed understanding and is in agreement with this plan.    4. Seborrheic keratosis  Scattered on the patient's face, neck, trunk, and extremities, there are multiple tan- to light brown-colored, hyperkeratotic, stuck-on appearing papules of varying size and shape    Seborrheic Keratoses - the benign nature of these lesions was discussed with the patient today and reassurance provided.  No treatment is medically indicated for these lesions at this time.    5. Lentigo  Photodistributed  Multiple tan- to light brown-colored, round- to oval-shaped, symmetric and uniform-appearing macules and small patches consistent with lentigines scattered in sun-exposed areas.    Solar Lentigines and photodamage.  The clinically benign-appearing nature of these lesions and their relation to chronic sun exposure were discussed with the patient today and reassurance provided.  None of these lesions meet threshold for biopsy today, and thus no treatment is medically indicated for these lesions at this time.  The signs and symptoms of skin cancer were reviewed and  the patient was advised to practice sun protection and sun avoidance, use daily sunscreen, and perform regular self skin exams.  The patient was instructed to monitor these lesions for any changes, such as in size, shape, or color, or associated symptoms and to call our office to schedule a return visit for re-evaluation if any such changes or symptoms are noticed in the future.  The patient expressed understanding and is in agreement with this plan.    6. Rosacea  Head - Anterior (Face)  On the patient's face, most prominent over the bilateral medial cheeks and nose, there is moderate underlying erythema with several overlying telangiectasias and a few scattered erythematous, inflammatory papules     Rosacea -flare on face; papulo-pustular type.  The chronic and intermittently flaring nature of this condition and treatment options were discussed extensively with the patient today.  At this time, I recommend topical therapy with MetroCream 0.75%, which the patient was instructed to apply twice daily to the affected areas of the face.  I also discussed the various triggers of rosacea with the patient today, especially sun exposure, and emphasized the importance of trigger avoidance, particularly sun avoidance and sun protection with daily sunscreen use.  The risks, benefits, and side effects of this medication were discussed.  The patient expressed understanding and is in agreement with this plan.    7. Personal history of malignant melanoma of skin  The sites of prior melanoma excision were examined.  There is no evidence of recurrent growth or pigmentation or recurrent disease, satellite papules, or nodules on exam today.    History of melanoma, lentigo maligna type, nonmelanoma skin cancers, dysplastic nevi, and actinic keratoses and photodamage.  There is no evidence of local, regional, or distant recurrent disease or any new primary melanomas on exam today.  I emphasized the importance of continuing to perform  monthly self-skin and lymph node exams using the ABCDs of monitoring moles, which were reviewed with the patient, as well as the importance of sun avoidance and sun protection with daily sunscreen use.  I will have the patient return to our office in 3 to 4 months, pending the above biopsy results, for routine melanoma follow-up and total body skin exam, and the patient was instructed to call our office should the patient notice any new, changing, symptomatic, or otherwise concerning skin lesions before then.  The patient expressed understanding and is in agreement with this plan.    8. Diffuse photodamage of skin  Photodistributed  Diffuse photodamage with actinic changes with telangiectasia and mottled pigmentation in sun-exposed areas.    Photodamage.  The signs and symptoms of skin cancer were reviewed and the patient was advised to practice sun protection and sun avoidance, use daily sunscreen, and perform regular self skin exams.  Sun protection was discussed, including avoiding the mid-day sun, wearing a sunscreen with SPF at least 50, and stressing the need for reapplication of sunscreen and applying more than they think they need.

## 2024-08-26 ENCOUNTER — TELEPHONE (OUTPATIENT)
Dept: PHARMACY | Facility: HOSPITAL | Age: 69
End: 2024-08-26
Payer: COMMERCIAL

## 2024-08-26 ENCOUNTER — TELEPHONE (OUTPATIENT)
Dept: DERMATOLOGY | Facility: CLINIC | Age: 69
End: 2024-08-26
Payer: COMMERCIAL

## 2024-08-26 NOTE — TELEPHONE ENCOUNTER
Dr Estrada returning call from Friday for biopsy results     Pt called again , I returned call to pt to let him know that Dr Cobb will be calling him soon with bx results ..  Pt called again ..today

## 2024-08-26 NOTE — TELEPHONE ENCOUNTER
I spoke with Mr Lawrence today and requested that he have his PT/INR tested at the lab asap. He agreed to test this week.

## 2024-08-27 ENCOUNTER — LAB (OUTPATIENT)
Dept: LAB | Facility: LAB | Age: 69
End: 2024-08-27
Payer: COMMERCIAL

## 2024-08-27 ENCOUNTER — APPOINTMENT (OUTPATIENT)
Dept: PRIMARY CARE | Facility: CLINIC | Age: 69
End: 2024-08-27
Payer: COMMERCIAL

## 2024-08-27 ENCOUNTER — ANTICOAGULATION - WARFARIN VISIT (OUTPATIENT)
Dept: PHARMACY | Facility: HOSPITAL | Age: 69
End: 2024-08-27

## 2024-08-27 VITALS
BODY MASS INDEX: 28.36 KG/M2 | SYSTOLIC BLOOD PRESSURE: 118 MMHG | HEART RATE: 68 BPM | HEIGHT: 74 IN | RESPIRATION RATE: 14 BRPM | WEIGHT: 221 LBS | DIASTOLIC BLOOD PRESSURE: 68 MMHG

## 2024-08-27 DIAGNOSIS — I48.91 ATRIAL FIBRILLATION, UNSPECIFIED TYPE (MULTI): Primary | ICD-10-CM

## 2024-08-27 DIAGNOSIS — I48.91 ATRIAL FIBRILLATION, UNSPECIFIED TYPE (MULTI): ICD-10-CM

## 2024-08-27 DIAGNOSIS — Z00.00 ROUTINE MEDICAL EXAM: Primary | ICD-10-CM

## 2024-08-27 LAB
INR PPP: 2 (ref 0.9–1.1)
PROTHROMBIN TIME: 22.6 SECONDS (ref 9.8–12.8)

## 2024-08-27 PROCEDURE — 1123F ACP DISCUSS/DSCN MKR DOCD: CPT | Performed by: FAMILY MEDICINE

## 2024-08-27 PROCEDURE — 3078F DIAST BP <80 MM HG: CPT | Performed by: FAMILY MEDICINE

## 2024-08-27 PROCEDURE — 1159F MED LIST DOCD IN RCRD: CPT | Performed by: FAMILY MEDICINE

## 2024-08-27 PROCEDURE — 85610 PROTHROMBIN TIME: CPT

## 2024-08-27 PROCEDURE — 3074F SYST BP LT 130 MM HG: CPT | Performed by: FAMILY MEDICINE

## 2024-08-27 PROCEDURE — 1036F TOBACCO NON-USER: CPT | Performed by: FAMILY MEDICINE

## 2024-08-27 PROCEDURE — 36415 COLL VENOUS BLD VENIPUNCTURE: CPT

## 2024-08-27 PROCEDURE — 1160F RVW MEDS BY RX/DR IN RCRD: CPT | Performed by: FAMILY MEDICINE

## 2024-08-27 PROCEDURE — 99397 PER PM REEVAL EST PAT 65+ YR: CPT | Performed by: FAMILY MEDICINE

## 2024-08-27 PROCEDURE — 3008F BODY MASS INDEX DOCD: CPT | Performed by: FAMILY MEDICINE

## 2024-08-27 PROCEDURE — 1158F ADVNC CARE PLAN TLK DOCD: CPT | Performed by: FAMILY MEDICINE

## 2024-08-27 NOTE — PROGRESS NOTES
pt has regular dental visits. no vision problems. no hearing loss.   Lifestyle: healthy diet.  Pt exercises regularly.   he does not use tobacco. he denies alcohol abuse.   Reproductive health: the patient is not sexually active.  Safety elements used: seat belt, safe driving habits and smoke detector.   no passive smoke exposure, no chemical abuse, no domestic violence, no anxiety symptoms, no depression symptoms, safe driving habits, no driving violations, no history of DUI and no tuberculosis exposure.     Review of Systems  Constitutional: no chills, no fever and no night sweats.   Eyes: no blurred vision and no eyesight problems.   ENT: no hearing loss, no nasal congestion, no nasal discharge, no hoarseness and no sore throat.   Neck: no mass(es) and no swelling.   Cardiovascular: no chest pain, no intermittent leg claudication, no lower extremity edema, no palpitations and no syncope.   Respiratory: no cough, no shortness of breath or wheezing.   Gastrointestinal: no abdominal pain, no constipation, no blood in stools, no diarrhea, no melena, no nausea, vomiting.   Genitourinary: no dysuria, no change in urinary frequency, no genital lesions, no hematuria, no urinary hesitancy, no incontinence, no nocturia, no lump, no testicular pain   Musculoskeletal: no arthralgias, no back pain, no localized joint pain, no myalgias and no neck pain.   Integumentary: no new skin lesions, no rashes and no skin wound.   Neurological: no confusion, no convulsions, no difficulty walking, no dizziness, no headache, no limb weakness, no memory changes, +b/l feet numbness, no speech difficulties, no syncope and no tingling.   Psychiatric: no anxiety, no personality change, no depression, no emotional problems, no sleep disturbances   Endocrine: no changes in appetite, no deepening of the voice, no polyuria, no feelings of weakness, no heat/cold intolerance, no muscle weakness, no polydipsia, no recent weight gain and no recent  weight loss.   Hematologic/Lymphatic: no tendency for easy bleeding, no tendency for easy bruising, no recurrent infections and no swollen glands.     Physical Exam  Constitutional: Alert and in no acute distress. Well developed, well nourished.   Head and Face: Head and face: Normal.  Eyes: Normal external exam. Pupils: PERRLA, EOMI.   Ears, Nose, Mouth, and Throat: ear wax b/l, Hearing: Normal.  Nasal mucosa: Normal.  Oropharynx: Normal.    Neck:  Supple. No neck mass was observed. Thyroid not enlarged   Cardiovascular: irir. Pedal pulses: Normal. No peripheral edema.   Pulmonary: No respiratory distress. Clear bilateral breath sounds.   Abdomen: Soft nontender; no abdominal mass palpated. No organomegaly. Musculoskeletal: Gait and station: Normal. No joint swelling seen, normal movements of all extremities. Range of motion: Normal.    Skin: Normal skin color, normal skin turgor, and no rash. Palpation of skin and subcutaneous tissue: Normal.    Neurologic: Cranial nerves 2-12 grossly intact. Sensation: Normal.   Psychiatric: Judgment and insight: Intact. Alert and oriented x 3. Recent and remote memory: Normal.  Mood and affect: Normal.   Lymphatic: No cervical lymphadenopathy.   Unremarkable PE except overweight. recommend nutritionist eval. Recommend DASH diet and regular exercise. recommend A1c, CBC, CMP, lipid, TSH, pt declined.  Advise eye exam by an OD yearly and dental exam every 6 months. will monitor lipid and weight yearly. Recommend sunscreen application if exposed to the sun when UV index is above 2.  Recommend Hep C test. recommend rsv, covid  shot.  We will call pt regarding lab results. f/u as scheduled.

## 2024-08-27 NOTE — PATIENT INSTRUCTIONS
Your INR was in range at 2.0 (goal 2-3). We will have you continue your weekly regimen of 2.5mg MWF and 1.25 mg all other days. We will retest in 4 weeks.  If any questions arise do not hesitate to call us at [phone] M-F from 8:30am-4:30pm or at   [phone] after 5pm or on the weekends. Continue to monitor for any excess bleeding or bruising, especially for blood in your stool.

## 2024-08-27 NOTE — PROGRESS NOTES
I received a lab result from 8.27.24 with an INR of 2.0. He reports taking his warfarin as instructed and reports no missed doses. He reports no others changes in medications, no signs or symptoms of bleeding. He had a few more salads this past week than normal. Given his INR is in range, we will continue his weekly regimen of 2.5mg on MWF and 1.25mg all other days. We will recheck in INR in 4 weeks.

## 2024-09-06 DIAGNOSIS — I34.0 NONRHEUMATIC MITRAL VALVE REGURGITATION: ICD-10-CM

## 2024-09-06 DIAGNOSIS — I10 HYPERTENSION, UNSPECIFIED TYPE: ICD-10-CM

## 2024-09-06 DIAGNOSIS — I48.21 PERMANENT ATRIAL FIBRILLATION (MULTI): ICD-10-CM

## 2024-09-06 DIAGNOSIS — Z79.01 WARFARIN ANTICOAGULATION: ICD-10-CM

## 2024-09-06 DIAGNOSIS — I48.91 ATRIAL FIBRILLATION, UNSPECIFIED TYPE (MULTI): ICD-10-CM

## 2024-09-06 DIAGNOSIS — I42.9 CARDIOMYOPATHY, UNSPECIFIED TYPE (MULTI): ICD-10-CM

## 2024-09-06 DIAGNOSIS — I10 PRIMARY HYPERTENSION: ICD-10-CM

## 2024-09-06 DIAGNOSIS — I51.9 CHRONIC SYSTOLIC DYSFUNCTION OF LEFT VENTRICLE: ICD-10-CM

## 2024-09-06 DIAGNOSIS — E78.5 DYSLIPIDEMIA: ICD-10-CM

## 2024-09-06 DIAGNOSIS — Z87.74 HISTORY OF ATRIAL SEPTAL DEFECT: ICD-10-CM

## 2024-09-06 DIAGNOSIS — I48.20 CHRONIC ATRIAL FIBRILLATION (MULTI): ICD-10-CM

## 2024-09-06 RX ORDER — DILTIAZEM HYDROCHLORIDE 240 MG/1
240 CAPSULE, COATED, EXTENDED RELEASE ORAL DAILY
Qty: 90 CAPSULE | Refills: 3 | Status: SHIPPED | OUTPATIENT
Start: 2024-09-06

## 2024-09-06 RX ORDER — WARFARIN 2.5 MG/1
TABLET ORAL
Qty: 60 TABLET | Refills: 3 | Status: SHIPPED | OUTPATIENT
Start: 2024-09-06

## 2024-09-06 RX ORDER — ATORVASTATIN CALCIUM 10 MG/1
10 TABLET, FILM COATED ORAL DAILY
Qty: 90 TABLET | Refills: 3 | Status: SHIPPED | OUTPATIENT
Start: 2024-09-06

## 2024-09-26 ENCOUNTER — LAB (OUTPATIENT)
Dept: LAB | Facility: LAB | Age: 69
End: 2024-09-26
Payer: COMMERCIAL

## 2024-09-26 ENCOUNTER — HOSPITAL ENCOUNTER (OUTPATIENT)
Dept: CARDIOLOGY | Facility: HOSPITAL | Age: 69
Discharge: HOME | End: 2024-09-26
Payer: COMMERCIAL

## 2024-09-26 ENCOUNTER — ANTICOAGULATION - WARFARIN VISIT (OUTPATIENT)
Dept: PHARMACY | Facility: HOSPITAL | Age: 69
End: 2024-09-26

## 2024-09-26 DIAGNOSIS — Z79.01 WARFARIN ANTICOAGULATION: ICD-10-CM

## 2024-09-26 DIAGNOSIS — I34.0 NONRHEUMATIC MITRAL VALVE REGURGITATION: ICD-10-CM

## 2024-09-26 DIAGNOSIS — I51.9 CHRONIC SYSTOLIC DYSFUNCTION OF LEFT VENTRICLE: ICD-10-CM

## 2024-09-26 DIAGNOSIS — I48.21 PERMANENT ATRIAL FIBRILLATION (MULTI): ICD-10-CM

## 2024-09-26 DIAGNOSIS — E78.5 DYSLIPIDEMIA: ICD-10-CM

## 2024-09-26 DIAGNOSIS — I48.91 ATRIAL FIBRILLATION, UNSPECIFIED TYPE (MULTI): ICD-10-CM

## 2024-09-26 DIAGNOSIS — I10 PRIMARY HYPERTENSION: ICD-10-CM

## 2024-09-26 DIAGNOSIS — I48.91 ATRIAL FIBRILLATION, UNSPECIFIED TYPE (MULTI): Primary | ICD-10-CM

## 2024-09-26 DIAGNOSIS — I48.20 CHRONIC ATRIAL FIBRILLATION (MULTI): ICD-10-CM

## 2024-09-26 DIAGNOSIS — I42.9 CARDIOMYOPATHY, UNSPECIFIED TYPE (MULTI): ICD-10-CM

## 2024-09-26 DIAGNOSIS — Z87.74 HISTORY OF ATRIAL SEPTAL DEFECT: ICD-10-CM

## 2024-09-26 LAB
AORTIC VALVE MEAN GRADIENT: 6.5 MMHG
AORTIC VALVE PEAK VELOCITY: 1.77 M/S
AV PEAK GRADIENT: 12.5 MMHG
AVA (PEAK VEL): 2.2 CM2
AVA (VTI): 2.24 CM2
EJECTION FRACTION: 56 %
GLOBAL LONGITUDINAL STRAIN: 12.2 %
INR PPP: 2.3 (ref 0.9–1.1)
LEFT ATRIUM VOLUME AREA LENGTH INDEX BSA: 55.4 ML/M2
LEFT VENTRICLE INTERNAL DIMENSION DIASTOLE: 6.22 CM (ref 3.5–6)
LEFT VENTRICULAR OUTFLOW TRACT DIAMETER: 1.85 CM
MITRAL VALVE E/A RATIO: 2.53
MITRAL VALVE E/E' RATIO: 14.03
PROTHROMBIN TIME: 25.9 SECONDS (ref 9.8–12.8)
RIGHT VENTRICLE FREE WALL PEAK S': 12.34 CM/S
RIGHT VENTRICLE PEAK SYSTOLIC PRESSURE: 48 MMHG
TRICUSPID ANNULAR PLANE SYSTOLIC EXCURSION: 2.3 CM

## 2024-09-26 PROCEDURE — 36415 COLL VENOUS BLD VENIPUNCTURE: CPT

## 2024-09-26 PROCEDURE — 93356 MYOCRD STRAIN IMG SPCKL TRCK: CPT | Performed by: INTERNAL MEDICINE

## 2024-09-26 PROCEDURE — 85610 PROTHROMBIN TIME: CPT

## 2024-09-26 PROCEDURE — 93306 TTE W/DOPPLER COMPLETE: CPT

## 2024-09-26 PROCEDURE — 93306 TTE W/DOPPLER COMPLETE: CPT | Performed by: INTERNAL MEDICINE

## 2024-09-26 NOTE — PROGRESS NOTES
I received a lab result from 9.26.24 with an INR of 2.3. He reports taking his warfarin as instructed and reports no missed doses. He reports no others changes in medications, no signs or symptoms of bleeding. Green intake remains consistent. Given his INR is in range, we will continue his weekly regimen of 2.5mg on MWF and 1.25mg all other days. We will recheck in INR in 4 weeks

## 2024-09-26 NOTE — PATIENT INSTRUCTIONS
Your INR was in range at 2.3 (goal 2-3). We will have you continue your weekly regimen of 2.5mg MWF and 1.25 mg all other days. We will retest in 4 weeks.  If any questions arise do not hesitate to call us at [phone] M-F from 8:30am-4:30pm or at   [phone] after 5pm or on the weekends. Continue to monitor for any excess bleeding or bruising, especially for blood in your stool.

## 2024-09-27 ENCOUNTER — TELEPHONE (OUTPATIENT)
Dept: CARDIOLOGY | Facility: HOSPITAL | Age: 69
End: 2024-09-27
Payer: COMMERCIAL

## 2024-09-27 NOTE — TELEPHONE ENCOUNTER
9/27/24  1021  Returned call to patient. Gave echocardiogram results to patient with patient verbalizing understanding.    9/27/24  0941  Called patient; no answer. Left voice message for patient to return call for echocardiogram results.    ----- Message from Pantera Fong sent at 9/26/2024  5:06 PM EDT -----  Normal left ventricular systolic function with an ejection fraction of 56%, normal right ventricular systolic function, biatrial dilatation, moderate mitral valve regurgitation, moderate tricuspid valve regurgitation.

## 2024-09-27 NOTE — TELEPHONE ENCOUNTER
----- Message from Pantera Fong sent at 9/26/2024  5:06 PM EDT -----  Normal left ventricular systolic function with an ejection fraction of 56%, normal right ventricular systolic function, biatrial dilatation, moderate mitral valve regurgitation, moderate tricuspid valve regurgitation.

## 2024-10-08 DIAGNOSIS — I48.91 ATRIAL FIBRILLATION, UNSPECIFIED TYPE (MULTI): Primary | ICD-10-CM

## 2024-10-11 ENCOUNTER — APPOINTMENT (OUTPATIENT)
Dept: CARDIOLOGY | Facility: CLINIC | Age: 69
End: 2024-10-11
Payer: COMMERCIAL

## 2024-10-30 ENCOUNTER — APPOINTMENT (OUTPATIENT)
Dept: DERMATOLOGY | Facility: CLINIC | Age: 69
End: 2024-10-30
Payer: COMMERCIAL

## 2024-11-01 PROBLEM — I48.21 PERMANENT ATRIAL FIBRILLATION (MULTI): Status: ACTIVE | Noted: 2024-07-22

## 2024-11-01 PROBLEM — Z86.79 HISTORY OF CARDIOMYOPATHY: Status: ACTIVE | Noted: 2024-04-21

## 2024-11-01 PROBLEM — I50.30 (HFPEF) HEART FAILURE WITH PRESERVED EJECTION FRACTION: Status: ACTIVE | Noted: 2024-11-01

## 2024-11-01 NOTE — PROGRESS NOTES
[facility] Card[facility]    Patient Name: Raman Lawrence  Patient : 1955      Scribe Attestation  By signing my name below, IShikha Scribe   attest that this documentation has been prepared under the direction and in the presence of Pantera Fong DO.      Reason for visit:  This is a 68-year-old male here for follow-up regarding his history of permanent atrial fibrillation, anticoagulation with warfarin, HFpEF/history of cardiomyopathy, history of primum ASD with prior surgical repair as well as mitral valve repair in , moderate mitral valve regurgitation, hypertension, dyslipidemia.      HPI:  This is a 68-year-old male here for follow-up regarding his history of permanent atrial fibrillation, anticoagulation with warfarin, HFpEF/history of cardiomyopathy, history of primum ASD with prior surgical repair as well as mitral valve repair in , moderate mitral valve regurgitation, hypertension, dyslipidemia.  The patient was last evaluated by me in 2024.  At that visit I suggested diuretic therapy which the patient declined, ordered blood work including CMP/lipid/magnesium/CBC, ordered additional blood work including BMP/CBC/magnesium to be drawn in 6 months, ordered an echocardiogram to be completed in 6 months, and asked the patient to follow-up in 6 months and sooner if necessary.  CMP done in May 2024 showed normal serum sodium and potassium with a serum creatinine of 0.98, normal ALT/AST, serum magnesium was 2.11, CBC showed a hemoglobin of 14.6.  Lipid panel done in May 2024 showed an LDL cholesterol of 86 and triglycerides of 79.  Echocardiogram done in 2024 showed normal left ventricular systolic function with an ejection fraction of 56%, normal right ventricular systolic function, biatrial dilatation, moderate mitral valve regurgitation, and moderate tricuspid valve regurgitation. ECG done today showed atrial fibrillation  with a heart rate of 52 bpm.  The patient reports that he has been feeling generally well from the cardiac standpoint. He denies any new chest pain, shortness of breath, palpitations and lightheadedness. He states that his lower extremity edema remain unchanged.  He reports that he exercises thrice a week to stay physically active. He states that he takes all of his medications as prescribed. During my exam, he was resting comfortably on the exam table.                 Assessment/Plan:   1. Permanent atrial fibrillation  The patient has a history of permanent atrial fibrillation and is currently on warfarin for thromboembolism prophylaxis.  Warfarin dosing and INR monitoring is being managed by the anticoagulation clinic through the pharmacy.  Patient should continue diltiazem for heart rate control.  ECG done today showed atrial fibrillation with a heart rate of 52 bpm.     He denies chest pain, palpitations or lightheadedness.  Echocardiogram done in September 2024 showed normal left ventricular systolic function with an ejection fraction of 56%, normal right ventricular systolic function, biatrial dilatation, moderate mitral valve regurgitation, and moderate tricuspid valve regurgitation.  Recent lab works as noted in the HPI.  Lab works will be done today and again in 6 months prior to the next visit.   Follow up in 6 months and sooner if necessary.      2. Anticoagulation with warfarin  The patient is on anticoagulation with warfarin for permanent atrial fibrillation.  Warfarin dosing and INR monitoring is being managed by the anticoagulation clinic through the pharmacy.  Recent lab works as noted in the HPI.  Lab works will be done today and again in 6 months prior to the next visit.     3.  HFpEF/History of cardiomyopathy  The patient has HFpEF/history of cardiomyopathy.  Echocardiogram done in September 2024 showed normal left ventricular systolic function with an ejection fraction of 56%, normal right  ventricular systolic function, biatrial dilatation, moderate mitral valve regurgitation, and moderate tricuspid valve regurgitation.  He does have 1+ pitting bilateral lower extremity edema on exam today.  He should continue his current cardiac medications.  Recent lab works as noted in the HPI.  Lab works will be done today and again in 6 months prior to the next visit.   I discussed with him the importance of following a low-sodium heart healthy diet, wearing compression stockings and elevating legs when seated.   Follow up in 6 months and sooner if necessary.      4. History of primum ASD  The patient has a history of history of primum ASD with prior surgical repair as well as mitral valve repair done in 1980 and 1981.   Echocardiogram done in September 2024 showed normal left ventricular systolic function with an ejection fraction of 56%, normal right ventricular systolic function, biatrial dilatation, moderate mitral valve regurgitation, and moderate tricuspid valve regurgitation.     5. Mitral valve regurgitation  The patient has a history of moderate mitral valve regurgitation.  Echocardiogram done in September 2024 showed normal left ventricular systolic function with an ejection fraction of 56%, normal right ventricular systolic function, biatrial dilatation, moderate mitral valve regurgitation, and moderate tricuspid valve regurgitation.  I will continue to monitor this clinically.     6. Hypertension  The patient has a history of hypertension which appears controlled on exam today.  He should continue his current antihypertensive medications and monitor his blood pressure at home.     7. Dyslipidemia  Lipid panel done in April 2023 showed an LDL cholesterol of 69 and triglycerides of 101 while on atorvastatin 10 mg daily.  Lipid panel will be updated in 6 months.  Please see lifestyle recommendations below.       Orders:   BMP/CBC/magnesium/BNP   CMP/lipid/magnesium/CBC/BNP in 6 months,   Follow-up in 6  months.        Lifestyle Recommendations  I recommend a whole-food plant-based diet, an eating pattern that encourages the consumption of unrefined plant foods (such as fruits, vegetables, tubers, whole grains, legumes, nuts and seeds) and discourages meats, dairy products, eggs and processed foods.     The AHA/ACC recommends that the patient consume a dietary pattern that emphasizes intake of vegetables, fruits, and whole grains; includes low-fat dairy products, poultry, fish, legumes, non-tropical vegetable oils, and nuts; and limits intake of sodium, sweets, sugar-sweetened beverages, and red meats.  Adapt this dietary pattern to appropriate calorie requirements (a 500-750 kcal/day deficit to loose weight), personal and cultural food preferences, and nutrition therapy for other medical conditions (including diabetes).  Achieve this pattern by following plans such as the [place] Mediterranean, DASH dietary pattern, or AHA diet.     Engage in 2 hours and 30 minutes per week of moderate-intensity physical activity, or 1 hour and 15 minutes (75 minutes) per week of vigorous-intensity aerobic physical activity, or an equivalent combination of moderate and vigorous-intensity aerobic physical activity. Aerobic activity should be performed in episodes of at least 10 minutes preferably spread throughout the week.     Adhering to a heart healthy diet, regular exercise habits, avoidance of tobacco products, and maintenance of a healthy weight are crucial components of their heart disease risk reduction.     Any positive review of systems not specifically addressed in the office visit today should be evaluated and treated by the patients primary care physician or in an emergency department if necessary     Patient was notified that results from ordered tests will be called to the patient if it changes current management; it will otherwise be discussed at a future appointment and available on  iWat[employer].     Thank you for  allowing me to participate in the care of this patient.        This document was generated using the assistance of voice recognition software. If there are any errors of spelling, grammar, syntax, or meaning; please feel free to contact me directly for clarification.    Past Medical History:  He has a past medical history of ASD (atrial septal defect) ([facility]), Cardiomyopathy (Multi), Chronic atrial fibrillation with RVR (Multi), Dyslipidemia, Fatigue, Hypertension, Mitral valve regurgitation, Other specified health status, Personal history of other diseases of the circulatory system, Personal history of other diseases of the circulatory system, Personal history of other diseases of the circulatory system, Personal history of other endocrine, nutritional and metabolic disease, and Warfarin anticoagulation.    Past Surgical History:  He has a past surgical history that includes Cardiac surgery (10/12/2015); Appendectomy (10/12/2015); Other surgical history (10/12/2015); Other surgical history (10/12/2015); Other surgical history (10/12/2015); Other surgical history (12/30/2020); and Other surgical history (06/20/2019).      Social History:  He reports that he has never smoked. He has never used smokeless tobacco. He reports that he does not currently use alcohol. He reports that he does not use drugs.    Family History:  Family History   Problem Relation Name Age of Onset    No Known Problems Mother      Heart attack Father          Allergies:  Halothane    Outpatient Medications:  Current Outpatient Medications   Medication Instructions    acetaminophen (Tylenol) 325 mg tablet 2 tablet EVERY 6 HOURS (route: oral)    atorvastatin (LIPITOR) 10 mg, oral, Daily    clindamycin (Cleocin T) 1 % lotion 1 Application    dilTIAZem CD (CARDIZEM CD) 240 mg, oral, Daily    lisinopril 5 mg, oral, Daily    metoprolol succinate XL (TOPROL-XL) 25 mg, oral, Daily    metroNIDAZOLE (Metrocream) 0.75 % cream 0    triamcinolone  "(Kenalog) 0.025 % cream Apply twice daily to affected areas of legs (avoid face, groin, body folds) for 2 weeks, then taper to twice daily on weekends only; repeat every 6-8 weeks as needed for flares    warfarin (Coumadin) 2.5 mg tablet Take 1 tablet (2.5mg) by mouth on Monday, Wednesday, Friday and 1/2 tablet (1.25mg) by mouth all other days or as directed by Coumadin clinic.        ROS:  A 14 point review of systems was done and is negative other than as stated in HPI    Vitals:      5/24/2023     4:00 PM 10/27/2023     3:31 PM 11/15/2023     3:50 PM 4/24/2024     3:35 PM 5/7/2024     8:35 PM 5/15/2024     8:13 AM 8/27/2024     8:52 AM   Vitals   Systolic 150 118 131 140 150 122 118   Diastolic 76 80 90 90 82 77 68   Heart Rate 73 60  69 80 49 68   Temp     36.8 °C (98.2 °F)     Resp     16  14   Height (in) 1.88 m (6' 2\")   1.88 m (6' 2\") 1.88 m (6' 2\")  1.88 m (6' 2\")   Weight (lb) 225 221  223 240  221   BMI 28.89 kg/m2 28.37 kg/m2  28.63 kg/m2 30.81 kg/m2  28.37 kg/m2   BSA (m2) 2.31 m2 2.28 m2  2.3 m2 2.39 m2  2.28 m2        Physical Exam:   Constitutional: Cooperative, in no acute distress, alert, appears stated age.  Skin: Skin color, texture, turgor normal. No rashes or lesions.  Head: Normocephalic. No masses, lesions, tenderness or abnormalities  Eyes: Extraocular movements are grossly intact.  Mouth and throat: Mucous membranes moist  Neck: Neck supple, no carotid bruits, no JVD  Respiratory: Lungs clear to auscultation, no wheezing or rhonchi, no use of accessory muscles  Chest wall: No scars, normal excursion with respiration  Cardiovascular: Irregular rhythm, + murmur  Gastrointestinal: Abdomen soft, nontender. Bowel sounds normal.  Musculoskeletal: Strength equal in upper extremities  Extremities: 1+ pitting edema  Neurologic: Sensation grossly intact, alert and oriented x3      Intake/Output:   No intake/output data recorded.    Outpatient Medications  Current Outpatient Medications on File Prior " to Visit   Medication Sig Dispense Refill    acetaminophen (Tylenol) 325 mg tablet 2 tablet EVERY 6 HOURS (route: oral)      atorvastatin (Lipitor) 10 mg tablet Take 1 tablet (10 mg) by mouth once daily. 90 tablet 3    clindamycin (Cleocin T) 1 % lotion 1 Application      dilTIAZem CD (Cardizem CD) 240 mg 24 hr capsule Take 1 capsule (240 mg) by mouth once daily. 90 capsule 3    lisinopril 5 mg tablet take 1 tablet by mouth once daily 90 tablet 1    metoprolol succinate XL (Toprol-XL) 25 mg 24 hr tablet Take 1 tablet (25 mg) by mouth once daily. 90 tablet 3    metroNIDAZOLE (Metrocream) 0.75 % cream 0      triamcinolone (Kenalog) 0.025 % cream Apply twice daily to affected areas of legs (avoid face, groin, body folds) for 2 weeks, then taper to twice daily on weekends only; repeat every 6-8 weeks as needed for flares 80 g 2    warfarin (Coumadin) 2.5 mg tablet Take 1 tablet (2.5mg) by mouth on Monday, Wednesday, Friday and 1/2 tablet (1.25mg) by mouth all other days or as directed by Coumadin clinic. 60 tablet 3     No current facility-administered medications on file prior to visit.       Labs: (past 26 weeks)  Recent Results (from the past 26 weeks)   PSA    Collection Time: 05/06/24  3:43 PM   Result Value Ref Range    Prostate Specific AG 5.40 (H) <=4.00 ng/mL   Comprehensive Metabolic Panel    Collection Time: 05/06/24  3:43 PM   Result Value Ref Range    Glucose 75 74 - 99 mg/dL    Sodium 137 136 - 145 mmol/L    Potassium 4.5 3.5 - 5.3 mmol/L    Chloride 105 98 - 107 mmol/L    Bicarbonate 24 21 - 32 mmol/L    Anion Gap 13 10 - 20 mmol/L    Urea Nitrogen 23 6 - 23 mg/dL    Creatinine 0.98 0.50 - 1.30 mg/dL    eGFR 85 >60 mL/min/1.73m*2    Calcium 9.6 8.6 - 10.3 mg/dL    Albumin 4.6 3.4 - 5.0 g/dL    Alkaline Phosphatase 108 33 - 136 U/L    Total Protein 6.9 6.4 - 8.2 g/dL    AST 16 9 - 39 U/L    Bilirubin, Total 1.2 0.0 - 1.2 mg/dL    ALT 22 10 - 52 U/L   Lipid Panel    Collection Time: 05/06/24  3:43 PM    Result Value Ref Range    Cholesterol 132 0 - 199 mg/dL    HDL-Cholesterol 30.7 mg/dL    Cholesterol/HDL Ratio 4.3     LDL Calculated 86 <=99 mg/dL    VLDL 16 0 - 40 mg/dL    Triglycerides 79 0 - 149 mg/dL    Non HDL Cholesterol 101 0 - 149 mg/dL   Magnesium    Collection Time: 05/06/24  3:43 PM   Result Value Ref Range    Magnesium 2.11 1.60 - 2.40 mg/dL   CBC    Collection Time: 05/06/24  3:43 PM   Result Value Ref Range    WBC 6.7 4.4 - 11.3 x10*3/uL    nRBC 0.0 0.0 - 0.0 /100 WBCs    RBC 4.54 4.50 - 5.90 x10*6/uL    Hemoglobin 14.6 13.5 - 17.5 g/dL    Hematocrit 42.7 41.0 - 52.0 %    MCV 94 80 - 100 fL    MCH 32.2 26.0 - 34.0 pg    MCHC 34.2 32.0 - 36.0 g/dL    RDW 15.0 (H) 11.5 - 14.5 %    Platelets 211 150 - 450 x10*3/uL   Protime-INR    Collection Time: 05/06/24  3:43 PM   Result Value Ref Range    Protime 19.5 (H) 9.8 - 12.8 seconds    INR 1.7 (H) 0.9 - 1.1   POCT UA (nonautomated) manually resulted    Collection Time: 05/16/24  3:47 PM   Result Value Ref Range    POC Glucose, Urine NEGATIVE NEGATIVE mg/dl    POC Bilirubin, Urine NEGATIVE NEGATIVE    POC Ketones, Urine NEGATIVE NEGATIVE mg/dl    POC Specific Gravity, Urine 1.025 1.005 - 1.035    POC Blood, Urine NEGATIVE NEGATIVE    POC PH, Urine 5.5 No Reference Range Established PH    POC Protein, Urine NEGATIVE NEGATIVE, 30 (1+) mg/dl    POC Urobilinogen, Urine 0.2 0.2, 1.0 EU/DL    Poc Nitrite, Urine NEGATIVE NEGATIVE    POC Leukocytes, Urine NEGATIVE NEGATIVE   Protime-INR    Collection Time: 05/28/24  4:07 PM   Result Value Ref Range    Protime 24.3 (H) 9.8 - 12.8 seconds    INR 2.1 (H) 0.9 - 1.1   Dermatopathology- DERM LAB    Collection Time: 06/28/24  1:36 PM   Result Value Ref Range    Case Report       Dermatopathology                                  Case: [id number]                                   Authorizing Provider:  Kayla Mujica MD       Collected:           06/28/2024 3060              Ordering Location:     [facility]        Received:            06/28/2024 1444              Pathologist:           Mary Escobar MD                                                             Specimen:    SKIN, Right Lateral Distal Forearm                                                         FINAL DIAGNOSIS       SKIN, RIGHT LATERAL DISTAL FOREARM, EXCISION:  SQUAMOUS CELL CARCINOMA AND REACTIVE CHANGES, CONSISTENT WITH PRIOR PROCEDURE SITE, INKED MARGINS FREE IN THESE PLANES OF SECTION.        ** Electronically signed out by MARY ESCOBAR MD **              By the signature on this report, the individual or group listed as making the Final Interpretation/Diagnosis certifies that they have reviewed this case.       Clinical History       Encounter Diagnosis: Squamous cell cancer of skin of right forearm       [id number]  Collection Comments: Differential Diagnosis: SCCIS  Check Margins Yes  Comments:    Dermpath Lab: Routine Histopathology (formalin-fixed tissue)  Finding Region: Right Forearm - Posterior  Specimen Objective: Is a 1.0 x 0.8 cm scar          Microscopic Description       Microscopic examination performed.        Gross Description       A:   Received in formalin is a 39 x 13 x 2 mm piece of skin.  It is tan and brown in color.  It is ellipsoid in shape.  It was embedded in toto in four blocks. The tips are in block A1. The specimen was inked.      The specimen was grossed by Jen Ness.      Protime-INR    Collection Time: 06/29/24  8:24 AM   Result Value Ref Range    Protime 19.7 (H) 9.8 - 12.8 seconds    INR 1.7 (H) 0.9 - 1.1   Protime-INR    Collection Time: 07/20/24  9:34 AM   Result Value Ref Range    Protime 27.5 (H) 9.8 - 12.8 seconds    INR 2.4 (H) 0.9 - 1.1   Dermatopathology- DERM LAB    Collection Time: 08/15/24 12:11 PM   Result Value Ref Range    Case Report       Dermatopathology                                  Case: [id number]                                   Authorizing Provider:  Brendan Cobb MD           Collected:           08/15/2024 1211              Ordering Location:     [facility]       Received:            08/15/2024 9701              Pathologist:           Esperanza Tse MD                                                             Specimens:   A) - SKIN, Left Medial Distal Dorsal Forearm                                                        B) - SKIN, Left Lateral Mid-Forearm                                                        FINAL DIAGNOSIS       A. SKIN, LEFT MEDIAL DISTAL DORSAL FOREARM, SHAVE EXCISION:  ACTINIC KERATOSIS, PRESENT ON THE DEEP AND PERIPHERAL MARGIN.    B. SKIN, LEFT LATERAL MID-FOREARM, SHAVE EXCISION:  BASAL CELL CARCINOMA, SUPERFICIAL GROWTH PATTERN, APPROXIMATELY 0.2 MM FROM THE DEEP MARGIN.    ** Electronically signed out by Esperanza Tse MD **                By the signature on this report, the individual or group listed as making the Final Interpretation/Diagnosis certifies that they have reviewed this case.       Clinical History       Encounter Diagnosis: Neoplasm of uncertain behavior of skin       [id number]  Collection Comments: Differential Diagnosis: BCC v AK v SCCIS v BLK  Check Margins Yes/No?:    Comments:    Dermpath Lab: Routine Histopathology (formalin-fixed tissue)  Finding Region: Left Forearm - Anterior  Specimen Objective: 8 mm pink, shiny papule     [id number]  Collection Comments: Differential Diagnosis: BCC v AK v SCCIS v BLK  Check Margins Yes/No?:    Comments:    Dermpath Lab: Routine Histopathology (formalin-fixed tissue)  Finding Region: Left Forearm - Anterior  Specimen Objective: 7 mm pink, shiny papule       Microscopic Description       A. Microscopic examination reveals basal layer keratinocyte atypia.    B. Microscopic analysis shows multifocal buds and irregular proliferations of bland basaloid keratinocytes with palisaded arrangement of peripheral nuclei arising from epidermis.  Mucinous stroma separates the carcinoma buds from dermis.   Skip areas are present, where normal epidermis separates focal areas of carcinoma.      Gross Description       A:  Received in formalin is a 5 x 5 x 1 mm piece of skin.  It is tan in color.  It is shave in shape.  It was embedded in toto.  The specimen was inked.     B:  Received in formalin is a 5 x 5 x 1 mm piece of skin.  It is tan in color.  It is shave in shape.  It was embedded in toto.  The specimen was inked.      The specimen was grossed by Melisa Marie.       Protime-INR    Collection Time: 08/27/24  9:43 AM   Result Value Ref Range    Protime 22.6 (H) 9.8 - 12.8 seconds    INR 2.0 (H) 0.9 - 1.1   Transthoracic Echo (TTE) Complete    Collection Time: 09/26/24  2:29 PM   Result Value Ref Range    LVOT diam 1.85 cm    MV E/A ratio 2.53     MV avg E/e' ratio 14.03     Tricuspid annular plane systolic excursion 2.3 cm    AV mn grad 6.5 mmHg    LA vol index A/L 55.4 ml/m2    AV pk clifford 1.77 m/s    LV EF 56 %    RV free wall pk S' 12.34 cm/s    LV GLS 12.2 %    RVSP 48.0 mmHg    LVIDd 6.22 cm    Aortic Valve Area by Continuity of VTI 2.24 cm2    Aortic Valve Area by Continuity of Peak Velocity 2.20 cm2    AV pk grad 12.5 mmHg   Protime-INR    Collection Time: 09/26/24  2:34 PM   Result Value Ref Range    Protime 25.9 (H) 9.8 - 12.8 seconds    INR 2.3 (H) 0.9 - 1.1       ECG  No results found. However, due to the size of the patient record, not all encounters were searched. Please check Results Review for a complete set of results.    Echocardiogram  No results found for this or any previous visit from the past 1095 days.      CV Studies:  EKG:No results found for this or any previous visit (from the past 4464 hours).  Echocardiogram:   Echocardiogram     [address] [address]  Phone [phone] Fax [phone]    TRANSTHORACIC ECHOCARDIOGRAM REPORT      Patient Name:     ERIN Francis Physician:   [id number] Pantera Fong DO  Study Date:        5/9/2023          Referring Physician: YULISSA ANDUJAR  MRN/PID:          [id number]          PCP:  Accession/Order#: [id number]      Department Location: [facility] Echo Lab  YOB: 1955        Fellow:  Gender:           KASIA                 Nurse:               Aleja Worley  Admit Date:                         Sonographer:         Juanita Acevedo  Admission Status: Outpatient        Additional Staff:  Height:           187.96 cm         CC Report to:  Weight:           104.33 kg         Study Type:          Echocardiogram  BSA:              2.31 m2  Blood Pressure: 123 /70 mmHg    Diagnosis/ICD: Q21.20-Atrioventricular septal defect, unspecified as to partial  or complete; I34.0-Nonrheumatic mitral (valve) insufficiency  Indication:    ASD, Mitral regurgitation  Procedure/CPT: Echo Complete w Full [id number]    Patient History:  Pertinent History: ASD repair, MV repair.    Study Detail: The following Echo studies were performed: 2D, M-Mode, Doppler and  color flow. Technically challenging study due to body habitus and  prominent lung artifact. Optison used as a contrast agent for  endocardial border definition and agitated saline used as a  contrast agent for intraseptal flow evaluation.      PHYSICIAN INTERPRETATION:  Left Ventricle: Left ventricular systolic function is normal, with an estimated ejection fraction of 60-65%. There are no regional wall motion abnormalities. The left ventricular cavity size is normal. There is mildly increased left ventricular posterior wall thickness. Left ventricular diastolic filling was indeterminate.  Left Atrium: The left atrium is moderate to severely dilated. A bubble study using agitated saline was performed. Bubble study is negative.  Right Ventricle: The right ventricle is mildly enlarged. There is normal right ventricular global systolic function.  Right Atrium: The right atrium is moderately dilated.  Aortic Valve: The aortic valve is trileaflet. There is no  evidence of aortic valve regurgitation. The peak instantaneous gradient of the aortic valve is 12.0 mmHg. The mean gradient of the aortic valve is 7.0 mmHg.  Mitral Valve: The mitral valve is mildly thickened. There is moderate mitral valve regurgitation.  Tricuspid Valve: The tricuspid valve is structurally normal. There is moderate tricuspid regurgitation. The Doppler estimated RVSP is mild to moderately elevated at 41.4 mmHg.  Pulmonic Valve: The pulmonic valve is structurally normal. There is trace pulmonic valve regurgitation.  Pericardium: There is no pericardial effusion noted.  Aorta: The aortic root is normal.      CONCLUSIONS:  1. Left ventricular systolic function is normal with a 60-65% estimated ejection fraction.  2. The left atrium is moderate to severely dilated.  3. The right atrium is moderately dilated.  4. Moderate mitral valve regurgitation.  5. Mild to moderately elevated right ventricular systolic pressure.  6. Moderate tricuspid regurgitation.    QUANTITATIVE DATA SUMMARY:  2D MEASUREMENTS:  Normal Ranges:  Ao Root d:     3.10 cm   (2.0-3.7cm)  LAs:           6.10 cm   (2.7-4.0cm)  IVSd:          0.80 cm   (0.6-1.1cm)  LVPWd:         1.20 cm   (0.6-1.1cm)  LVIDd:         5.50 cm   (3.9-5.9cm)  LVIDs:         3.90 cm  LV Mass Index: 92.4 g/m2  LV % FS        29.1 %    LA VOLUME:  Normal Ranges:  LA Vol A4C:        118.2 ml   (22+/-6mL/m2)  LA Vol A2C:        108.8 ml  LA Vol BP:         114.4 ml  LA Vol Index A4C:  51.3ml/m2  LA Vol Index A2C:  47.2 ml/m2  LA Vol Index BP:   49.6 ml/m2  LA Area A4C:       32.6 cm2  LA Area A2C:       31.0 cm2  LA Major Axis A4C: 7.6 cm  LA Major Axis A2C: 7.5 cm    RA VOLUME BY A/L METHOD:  Normal Ranges:  RA Area A4C: 29.5 cm2    AORTA MEASUREMENTS:  Normal Ranges:  Ao Sinus, d: 3.10 cm (2.1-3.5cm)  Ao STJ, d:   2.30 cm (1.7-3.4cm)  Asc Ao, d:   3.09 cm (2.1-3.4cm)    LV SYSTOLIC FUNCTION BY 2D PLANIMETRY (MOD):  Normal Ranges:  EF-A4C View: 71.2 %  (>=55%)  EF-A2C View: 63.3 %  EF-Biplane:  67.0 %    LV DIASTOLIC FUNCTION:  Normal Ranges:  MV Peak E:    1.37 m/s (0.7-1.2 m/s)  MV e'         0.08 m/s (>8.0)  MV lateral e' 0.07 m/s  MV medial e'  0.10 m/s  E/e' Ratio:   16.12    (<8.0)    MITRAL VALVE:  Normal Ranges:  MV mean PG: 3.3 mmHg (<48mmHg)  MV DT:      232 msec (150-240msec)    MITRAL INSUFFICIENCY:  Normal Ranges:  MR VTI:       166.50 cm  MR Vmax:      521.00 cm/s  MR Alias Jamari: 38.8 cm/s  MR Volume:    51.90 ml  MR Flow Rt:   162.40 ml/s  MR EROA:      0.31 cm2    AORTIC VALVE:  Normal Ranges:  AoV Vmax:                1.73 m/s  (<=1.7m/s)  AoV Peak P.0 mmHg (<20mmHg)  AoV Mean P.0 mmHg  (1.7-11.5mmHg)  LVOT Max Jamari:            1.02 m/s  (<=1.1m/s)  AoV VTI:                 40.60 cm  (18-25cm)  LVOT VTI:                20.90 cm  LVOT Diameter:           2.00 cm   (1.8-2.4cm)  AoV Area, VTI:           1.62 cm2  (2.5-5.5cm2)  AoV Area,Vmax:           1.85 cm2  (2.5-4.5cm2)  AoV Dimensionless Index: 0.51    RIGHT VENTRICLE:  RV 1   4.24 cm  RV 2   2.99 cm  RV 3   8.38 cm  TAPSE: 27.0 mm  RV s'  0.11 m/s    TRICUSPID VALVE/RVSP:  Normal Ranges:  Peak TR Velocity: 2.89 m/s  RV Syst Pressure: 41.4 mmHg (< 30mmHg)  TV E Vmax:        0.47 m/s  (0.3-0.7m/s)  TV A Vmax:        0.61 m/s  IVC Diam:         2.81 cm      [id number] Pantera Fong DO  Electronically signed on 2023 at 4:53:27 PM         Final     Stress Testing IMGRESULT([id number]:1:): No results found for this or any previous visit from the past  days.    Cardiac Catheterization: No results found for this or any previous visit from the past  days.  No results found for this or any previous visit from the past 3650 days.     Cardiac Scoring: No results found for this or any previous visit from the past  days.    AAA : No results found for this or any previous visit from the past  days.    OTHER: No results found for this or any previous visit from the  past 1825 days.    LAST IMAGING RESULTS  Transthoracic Echo (TTE) Complete                [place] [id number]       Phone [phone] Fax [phone]    TRANSTHORACIC ECHOCARDIOGRAM REPORT    Patient Name:      ERIN HOLDEN    Reading Physician:    [id number] Pantera Andujar DO  Study Date:        9/26/2024            Ordering Provider:    [id number] PANTERA ANDUJAR  MRN/PID:           [id number]             Fellow:  Accession#:        [id number]         Nurse:  Date of Birth/Age: 1955 / 68      Sonographer:          Zuly Apple RDCS                     years  Gender:            M                    Additional Staff:  Height:            187.96 cm            Admit Date:  Weight:            100.24 kg            Admission Status:     Outpatient  BSA / BMI:         2.27 m2 / 28.37      Department Location:  [facility] Echo                     kg/m2                                      Lab  Blood Pressure: 118 /68 mmHg    Study Type:    TRANSTHORACIC ECHO (TTE) COMPLETE  Diagnosis/ICD: Chronic AFib-I48.20; Nonrheumatic mitral (valve)                 insufficiency-I34.0  Indication:    Atrial Fibrillation  CPT Codes:     Echo Complete w Full Doppler-[id number]    Patient History:  Pertinent History: AFIB, HTN, CHF, ASD REPAIR AND MV REPAIR (1980s).    Study Detail: The following Echo studies were performed: 2D, M-Mode, Doppler,                color flow and Strain. A bubble study was not performed.       PHYSICIAN INTERPRETATION:  Left Ventricle: The left ventricular systolic function is normal, with a calculated ejection fraction of 56% by auto EF. There are no regional wall motion abnormalities. The left ventricular cavity size is normal. Left Ventricular Global Longitudinal Strain - 12.2 %. Left ventricular diastolic filling was indeterminate.  Left Atrium: The left atrium is moderate to severely dilated. A bubble  study using agitated saline was not performed.  Right Ventricle: The right ventricle is normal in size. There is normal right ventricular global systolic function.  Right Atrium: The right atrium is mild to moderately dilated.  Aortic Valve: The aortic valve is trileaflet. The aortic valve dimensionless index is 0.83. There is no evidence of aortic valve regurgitation. The peak instantaneous gradient of the aortic valve is 12.5 mmHg. The mean gradient of the aortic valve is 6.5 mmHg.  Mitral Valve: The mitral valve is mildly thickened. There is mild mitral annular calcification. There is moderate mitral valve regurgitation. The mitral regurgitant orifice area is 49 mm2. The mitral regurgitant volume is 82.52 ml.  Tricuspid Valve: The tricuspid valve is structurally normal. There is moderate tricuspid regurgitation. The Doppler estimated RVSP is moderately elevated right ventricular systolic pressure at 48.0 mmHg.  Pulmonic Valve: The pulmonic valve is structurally normal. There is trace pulmonic valve regurgitation.  Pericardium: Trivial pericardial effusion. There is a pericardial fat pad present.  Aorta: The aortic root is normal.       CONCLUSIONS:   1. The left ventricular systolic function is normal, with a calculated ejection fraction of 56% by auto EF.   2. Left ventricular diastolic filling was indeterminate.   3. There is normal right ventricular global systolic function.   4. The left atrium is moderate to severely dilated.   5. The right atrium is mild to moderately dilated.   6. Moderate mitral valve regurgitation.   7. Moderate tricuspid regurgitation.   8. Moderately elevated right ventricular systolic pressure.    QUANTITATIVE DATA SUMMARY:     2D MEASUREMENTS:           Normal Ranges:  LAs:             6.22 cm   (2.7-4.0cm)  IVSd:            0.97 cm   (0.6-1.1cm)  LVPWd:           0.75 cm   (0.6-1.1cm)  LVIDd:           6.22 cm   (3.9-5.9cm)  LVIDs:           4.99 cm  LV Mass Index:   96.1 g/m2  LV %  FS          19.8 %       LA VOLUME:                    Normal Ranges:  LA Vol A4C:        127.5 ml   (22+/-6mL/m2)  LA Vol A2C:        123.8 ml  LA Vol BP:         125.6 ml  LA Vol Index A4C:  56.2 ml/m2  LA Vol Index A2C:  54.6 ml/m2  LA Vol Index BP:   55.4 ml/m2  LA Area A4C:       34.2 cm2  LA Area A2C:       33.7 cm2  LA Major Axis A4C: 7.8 cm  LA Major Axis A2C: 7.8 cm  LA Volume Index:   55.4 ml/m2  LA Vol A4C:        121.8 ml  LA Vol A2C:        120.2 ml  LA Vol Index BSA:  53.4 ml/m2       RA VOLUME BY A/L METHOD:          Normal Ranges:  RA Area A4C:             28.9 cm2       AORTA MEASUREMENTS:         Normal Ranges:  Ao Sinus, d:        3.60 cm (2.1-3.5cm)  Ao STJ, d:          2.50 cm (1.7-3.4cm)  Asc Ao, d:          3.30 cm (2.1-3.4cm)       LV SYSTOLIC FUNCTION BY 2D PLANIMETRY (MOD):                                         Normal Ranges:  EF-Auto:                          56 %  LV EF Reported:                   56 %  Global Longitudinal Strain (GLS): 12 %       LV DIASTOLIC FUNCTION:           Normal Ranges:  MV Peak E:             1.35 m/s  (0.7-1.2 m/s)  MV Peak A:             0.53 m/s  (0.42-0.7 m/s)  E/A Ratio:             2.53      (1.0-2.2)  MV e'                  0.099 m/s (>8.0)  MV lateral e'          0.11 m/s  MV medial e'           0.09 m/s  E/e' Ratio:            13.60     (<8.0)       MITRAL VALVE:          Normal Ranges:  MV DT:        183 msec (150-240msec)       MITRAL INSUFFICIENCY:             Normal Ranges:  PISA Radius:          1.0 cm  MR VTI:               169.62 cm  MR Vmax:              507.20 cm/s  MR Alias Jamari:         38.2 cm/s  MR Volume:            82.52 ml  MR Flow Rt:           246.75 ml/s  MR EROA:              49 mm2       AORTIC VALVE:                      Normal Ranges:  AoV Vmax:                1.77 m/s  (<=1.7m/s)  AoV Peak P.5 mmHg (<20mmHg)  AoV Mean P.5 mmHg  (1.7-11.5mmHg)  LVOT Max Jamari:            1.45 m/s  (<=1.1m/s)  AoV  VTI:                 35.80 cm  (18-25cm)  LVOT VTI:                29.86 cm  LVOT Diameter:           1.85 cm   (1.8-2.4cm)  AoV Area, VTI:           2.24 cm2  (2.5-5.5cm2)  AoV Area,Vmax:           2.20 cm2  (2.5-4.5cm2)  AoV Dimensionless Index: 0.83       RIGHT VENTRICLE:  RV Basal 3.90 cm  RV Mid   2.10 cm  RV Major 8.7 cm  TAPSE:   22.5 mm  RV s'    0.12 m/s       TRICUSPID VALVE/RVSP:          Normal Ranges:  Peak TR Velocity:     2.87 m/s  RV Syst Pressure:     48 mmHg  (< 30mmHg)  IVC Diam:             2.77 cm       AORTA:  Asc Ao Diam 3.25 cm       [id number] Pantera Fong DO  Electronically signed on 9/26/2024 at 4:56:40 PM       ** Final **      Problem List Items Addressed This Visit       Dyslipidemia    Hypertension    Warfarin anticoagulation    History of cardiomyopathy    History of atrial septal defect    Mitral valve regurgitation    Permanent atrial fibrillation (Multi) - Primary    (HFpEF) heart failure with preserved ejection fraction          Pantera Fong DO, FACC, FACOI

## 2024-11-04 ENCOUNTER — APPOINTMENT (OUTPATIENT)
Dept: DERMATOLOGY | Facility: CLINIC | Age: 69
End: 2024-11-04
Payer: COMMERCIAL

## 2024-11-04 DIAGNOSIS — L57.8 DIFFUSE PHOTODAMAGE OF SKIN: ICD-10-CM

## 2024-11-04 DIAGNOSIS — C44.619 BASAL CELL CARCINOMA (BCC) OF SKIN OF LEFT UPPER EXTREMITY INCLUDING SHOULDER: ICD-10-CM

## 2024-11-04 DIAGNOSIS — L57.0 ACTINIC KERATOSIS: Primary | ICD-10-CM

## 2024-11-04 PROCEDURE — 17263 DSTRJ MAL LES T/A/L 2.1-3.0: CPT | Performed by: DERMATOLOGY

## 2024-11-04 PROCEDURE — 17004 DESTROY PREMAL LESIONS 15/>: CPT | Performed by: DERMATOLOGY

## 2024-11-04 ASSESSMENT — DERMATOLOGY PATIENT ASSESSMENT
DO YOU USE SUNSCREEN: OCCASIONALLY
DO YOU USE A TANNING BED: NO

## 2024-11-04 ASSESSMENT — DERMATOLOGY QUALITY OF LIFE (QOL) ASSESSMENT: ARE THERE EXCLUSIONS OR EXCEPTIONS FOR THE QUALITY OF LIFE ASSESSMENT: NO

## 2024-11-04 NOTE — PROGRESS NOTES
"Subjective     Raman Lawrence is a 68 y.o. male who presents for the following: Discuss recent biopsy results and treatment options.  Biopsy of 2 suspicious lesions performed at his last visit in our office on 8/15/24 revealed an actinic keratosis on his left medial distal dorsal forearm and a basal cell carcinoma on his left lateral mid forearm.    Today, the patient states the biopsy sites healed well.  He denies any new, changing, or concerning skin lesions since his last visit; no bleeding, itching, or burning lesions.      Review of Systems:  No other skin or systemic complaints other than what is documented elsewhere in the note.    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 Inocencia Snyder on 8/10/21 s/p staged wide local excision with peripheral margin control (\"slow Mohs surgery\") by Dr. Fagan on 9/14/21    - recent biopsy-proven BCC on left lateral mid forearm diagnosed at last visit on 8/15/24 and pending definitive treatment  - history of 2 BCCs on right anterior distal leg and left lateral posterior neck both diagnosed on 4/17/24 s/p ED&C on 5/2/24 and Mohs surgery by Dr. Gonzalez on 5/15/24, respectively  - SCC in situ right lateral distal forearm diagnosed on 8/14/23 s/p wide local excision with 5-mm margins by Dr. Mujica on 6/28/24  - BCC on right lateral upper cheek diagnosed on 12/1/22 s/p Mohs surgery by Dr. Gonzalez on 3/23/23  - BCC on left proximal forearm diagnosed on " 4/7/22 s/p wide local excision with 5-mm margins by Dr. Gonzalez on 7/13/22  - SCC in situ on right dorsal distal forearm diagnosed on 12/9/21 s/p wide local excision with 5-mm margins by Dr. Gonzalez on 4/13/22  - BCC on right dorsal mid-forearm diagnosed on 12/9/21 s/p Mohs surgery by Dr. Gonzalez on 3/10/22  - BCC on left temple diagnosed by Dr. Fagan on 9/14/21 s/p Mohs surgery by Dr. Fagan on 9/16/21    - AKs    - junctional dysplastic nevus with mild atypia on right upper back on 12/9/21  - dysplastic junctional nevi in actinically damaged skin on right lateral distal arm and left medial mid forearm both diagnosed on 4/7/22 and s/p re-excision with 3-mm margins by Dr. Portillo on 9/26/22 and re-excision with 5-mm margins by Dr. Gallegos on 10/3/22, respectively  - mildly dysplastic compound nevus on left lateral mid arm diagnosed on 8/4/22  - moderately dysplastic junctional nevus with an area of more significant architectural disorder on right medial dorsal mid forearm diagnosed on 12/1/22 s/p re-excision with 3-mm margins by Dr. Gallegos on 4/3/23  - combined melanocytic nevus with features of dysplastic nevus (mild atypia) on left medial superior upper back and compound dysplastic nevus with mild atypia on left medial upper back both diagnosed on 4/6/23  - mildly dysplastic junctional nevus on right lateral inferior posterior neck diagnosed on 8/14/23  - inflamed mildly dysplastic compound nevus on right upper back diagnosed on 12/18/23  - 2 mildly dysplastic compound nevi on right lateral proximal arm and right posterior distal arm both diagnosed on 4/17/24    - reported history of BCC on left temple treated over 20 years ago with recurrence as detailed above  - reported history of multiple dysplastic nevi diagnosed by his previous outside Dermatologist, Dr. Inocencia Snyder    Family History:    No family history of melanoma or skin cancer    Social History:    The patient states he lives in [place], [place], and works  as a  for [employer] at the intersection of [address] [employer] and one of his co-workers is a patient in our office as well    Allergies:  Halothane    Current Medications / CAM's:    Current Outpatient Medications:     acetaminophen (Tylenol) 325 mg tablet, 2 tablet EVERY 6 HOURS (route: oral), Disp: , Rfl:     atorvastatin (Lipitor) 10 mg tablet, Take 1 tablet (10 mg) by mouth once daily., Disp: 90 tablet, Rfl: 3    clindamycin (Cleocin T) 1 % lotion, 1 Application, Disp: , Rfl:     dilTIAZem CD (Cardizem CD) 240 mg 24 hr capsule, Take 1 capsule (240 mg) by mouth once daily., Disp: 90 capsule, Rfl: 3    lisinopril 5 mg tablet, take 1 tablet by mouth once daily, Disp: 90 tablet, Rfl: 1    metoprolol succinate XL (Toprol-XL) 25 mg 24 hr tablet, Take 1 tablet (25 mg) by mouth once daily., Disp: 90 tablet, Rfl: 3    metroNIDAZOLE (Metrocream) 0.75 % cream, 0, Disp: , Rfl:     triamcinolone (Kenalog) 0.025 % cream, Apply twice daily to affected areas of legs (avoid face, groin, body folds) for 2 weeks, then taper to twice daily on weekends only; repeat every 6-8 weeks as needed for flares, Disp: 80 g, Rfl: 2    warfarin (Coumadin) 2.5 mg tablet, Take 1 tablet (2.5mg) by mouth on Monday, Wednesday, Friday and 1/2 tablet (1.25mg) by mouth all other days or as directed by Coumadin clinic., Disp: 60 tablet, Rfl: 3     Objective   Well appearing patient in no apparent distress; mood and affect are within normal limits.    A skin examination was performed including: Face, neck, and bilateral upper extremities. All findings within normal limits unless otherwise noted below.    Assessment/Plan   1. Actinic keratosis (16)  Left Forearm - Anterior (16)  On the patient's left medial distal dorsal forearm, there is a pink, well-healed scar at the recent biopsy site with a surrounding rim of erythema, grittiness, and scale; scattered on the patient's bilateral dorsal forearms, there are multiple  erythematous, gritty, scaly macules    Biopsy-proven Actinic Keratosis and additional AKs -left medial distal dorsal forearm, present on the deep and peripheral margins, and scattered on bilateral dorsal forearms, respectively.  The pre-cancerous nature of these lesions and treatment options were discussed with the patient today.  At this time, I recommend treatment with liquid nitrogen cryotherapy.  The patient expressed understanding, is in agreement with this plan, and wishes to proceed with cryotherapy today.    Destr of lesion - Left Forearm - Anterior (16)  Complexity: simple    Destruction method: cryotherapy    Informed consent: discussed and consent obtained    Lesion destroyed using liquid nitrogen: Yes    Cryotherapy cycles:  1  Outcome: patient tolerated procedure well with no complications    Post-procedure details: wound care instructions given      2. Basal cell carcinoma (BCC) of skin of left upper extremity including shoulder  Left lateral mid forearm  Pink, well-healed scar at his recent biopsy site    Destr of lesion    Destruction method: electrodesiccation and curettage    Informed consent: discussed and consent obtained    Timeout:  patient name, date of birth, surgical site, and procedure verified  Procedure prep:  Patient was prepped and draped  Anesthesia: the lesion was anesthetized in a standard fashion    Anesthetic:  1% lidocaine w/ epinephrine 1-100,000 local infiltration  Curettage performed in three different directions: Yes    Electrodesiccation performed over the curetted area: Yes    Curettage cycles:  3  Lesion length (cm):  1  Lesion width (cm):  1  Margin per side (cm):  0.6  Final wound size (cm):  2.2  Hemostasis achieved with:  electrodesiccation  Outcome: patient tolerated procedure well with no complications    Post-procedure details: sterile dressing applied and wound care instructions given    Dressing type: bandage and petrolatum      Staff Communication: Dermatology  Local Anesthesia: 1 % Lidocaine / Epinephrine - Amount:1ml    Biopsy-proven basal cell carcinoma - left lateral mid forearm; superficial growth pattern; diagnosed at last visit on 8/15/24 and pending definitive treatment.  The malignant nature of BCC, the need for further definitive treatment, and treatment options, including Mohs surgery, wide local excision, and Electrodesiccation & Curettage, were discussed extensively with the patient today.  After discussing the risks and benefits of each option, including the differences in cure rates and permanent scar results, the patient expressed understanding and wishes to proceed with definitive treatment with ED&C today.  I will have the patient return to our office in 3 months for re-evaluation of the ED&C site as well as routine follow-up and skin exam, and the patient was instructed to call should they notice any new, changing, symptomatic, or concerning skin lesions before then.  The patient expressed understanding, is in agreement with this plan, and wishes to proceed with the ED&C today.    3. Diffuse photodamage of skin  Photodistributed  Diffuse photodamage with actinic changes with telangiectasia and mottled pigmentation in sun-exposed areas.    History of melanoma, lentigo maligna type, nonmelanoma skin cancers, dysplastic nevi, and actinic keratoses and photodamage.  The patient was recently seen in our office for routine melanoma follow-up and total body skin exam on 8/15/24, at which time no evidence of recurrence was noted.  I emphasized the importance of continuing to perform monthly self-skin and lymph node exams using the ABCDs of monitoring moles, which were reviewed with the patient, as well as the importance of sun avoidance and sun protection with daily sunscreen use.  I will have the patient return to our office in 3 to 4 months from the date of his last visit for routine melanoma follow-up and total body skin exam, and the patient was instructed  to call our office should the patient notice any new, changing, symptomatic, or otherwise concerning skin lesions before then.  The patient expressed understanding and is in agreement with this plan.

## 2024-11-06 ENCOUNTER — LAB (OUTPATIENT)
Dept: LAB | Facility: LAB | Age: 69
End: 2024-11-06
Payer: COMMERCIAL

## 2024-11-06 ENCOUNTER — APPOINTMENT (OUTPATIENT)
Dept: CARDIOLOGY | Facility: HOSPITAL | Age: 69
End: 2024-11-06
Payer: COMMERCIAL

## 2024-11-06 VITALS
HEIGHT: 74 IN | BODY MASS INDEX: 28.54 KG/M2 | HEART RATE: 52 BPM | DIASTOLIC BLOOD PRESSURE: 70 MMHG | SYSTOLIC BLOOD PRESSURE: 120 MMHG | WEIGHT: 222.4 LBS

## 2024-11-06 DIAGNOSIS — Z86.79 HISTORY OF CARDIOMYOPATHY: ICD-10-CM

## 2024-11-06 DIAGNOSIS — I50.32 CHRONIC HEART FAILURE WITH PRESERVED EJECTION FRACTION: ICD-10-CM

## 2024-11-06 DIAGNOSIS — Z87.74 HISTORY OF ATRIAL SEPTAL DEFECT: ICD-10-CM

## 2024-11-06 DIAGNOSIS — I48.21 PERMANENT ATRIAL FIBRILLATION (MULTI): Primary | ICD-10-CM

## 2024-11-06 DIAGNOSIS — I34.0 NONRHEUMATIC MITRAL VALVE REGURGITATION: ICD-10-CM

## 2024-11-06 DIAGNOSIS — I10 PRIMARY HYPERTENSION: ICD-10-CM

## 2024-11-06 DIAGNOSIS — I48.20 CHRONIC ATRIAL FIBRILLATION (MULTI): ICD-10-CM

## 2024-11-06 DIAGNOSIS — I48.91 ATRIAL FIBRILLATION, UNSPECIFIED TYPE (MULTI): ICD-10-CM

## 2024-11-06 DIAGNOSIS — Z79.01 WARFARIN ANTICOAGULATION: ICD-10-CM

## 2024-11-06 DIAGNOSIS — I42.9 CARDIOMYOPATHY, UNSPECIFIED TYPE (MULTI): ICD-10-CM

## 2024-11-06 DIAGNOSIS — E78.5 DYSLIPIDEMIA: ICD-10-CM

## 2024-11-06 DIAGNOSIS — I51.9 CHRONIC SYSTOLIC DYSFUNCTION OF LEFT VENTRICLE: ICD-10-CM

## 2024-11-06 LAB
INR PPP: 2.1 (ref 0.9–1.1)
PROTHROMBIN TIME: 23.9 SECONDS (ref 9.8–12.8)

## 2024-11-06 PROCEDURE — 3078F DIAST BP <80 MM HG: CPT | Performed by: INTERNAL MEDICINE

## 2024-11-06 PROCEDURE — 3008F BODY MASS INDEX DOCD: CPT | Performed by: INTERNAL MEDICINE

## 2024-11-06 PROCEDURE — 1036F TOBACCO NON-USER: CPT | Performed by: INTERNAL MEDICINE

## 2024-11-06 PROCEDURE — 85610 PROTHROMBIN TIME: CPT

## 2024-11-06 PROCEDURE — 1159F MED LIST DOCD IN RCRD: CPT | Performed by: INTERNAL MEDICINE

## 2024-11-06 PROCEDURE — 99214 OFFICE O/P EST MOD 30 MIN: CPT | Performed by: INTERNAL MEDICINE

## 2024-11-06 PROCEDURE — 3074F SYST BP LT 130 MM HG: CPT | Performed by: INTERNAL MEDICINE

## 2024-11-06 PROCEDURE — 36415 COLL VENOUS BLD VENIPUNCTURE: CPT

## 2024-11-06 PROCEDURE — 93005 ELECTROCARDIOGRAM TRACING: CPT | Performed by: INTERNAL MEDICINE

## 2024-11-06 PROCEDURE — 93010 ELECTROCARDIOGRAM REPORT: CPT | Performed by: INTERNAL MEDICINE

## 2024-11-07 ENCOUNTER — ANTICOAGULATION - WARFARIN VISIT (OUTPATIENT)
Dept: PHARMACY | Facility: HOSPITAL | Age: 69
End: 2024-11-07
Payer: COMMERCIAL

## 2024-11-07 DIAGNOSIS — I48.91 ATRIAL FIBRILLATION, UNSPECIFIED TYPE (MULTI): Primary | ICD-10-CM

## 2024-11-07 NOTE — PROGRESS NOTES
I received a lab result from 11.6.24 with an INR of 2.1. He reports taking his warfarin as instructed and reports no missed doses. He reports no others changes in medications, no signs or symptoms of bleeding. Green intake remains consistent. Given his INR is in range, we will continue his weekly regimen of 2.5mg on MWF and 1.25mg all other days. We will recheck in INR in 4 weeks.

## 2024-11-07 NOTE — PATIENT INSTRUCTIONS
Your INR was in range at 2.1 (goal 2-3). We will have you continue your weekly regimen of 2.5mg MWF and 1.25 mg all other days. We will retest in 4 weeks.  If any questions arise do not hesitate to call us at [phone] M-F from 8:30am-4:30pm or at   [phone] after 5pm or on the weekends. Continue to monitor for any excess bleeding or bruising, especially for blood in your stool.

## 2024-12-11 ENCOUNTER — LAB (OUTPATIENT)
Dept: LAB | Facility: LAB | Age: 69
End: 2024-12-11
Payer: COMMERCIAL

## 2024-12-11 DIAGNOSIS — I48.91 ATRIAL FIBRILLATION, UNSPECIFIED TYPE (MULTI): ICD-10-CM

## 2024-12-11 LAB
INR PPP: 2.5 (ref 0.9–1.1)
PROTHROMBIN TIME: 28.2 SECONDS (ref 9.8–12.8)

## 2024-12-11 PROCEDURE — 36415 COLL VENOUS BLD VENIPUNCTURE: CPT

## 2024-12-11 PROCEDURE — 85610 PROTHROMBIN TIME: CPT

## 2024-12-12 ENCOUNTER — APPOINTMENT (OUTPATIENT)
Dept: DERMATOLOGY | Facility: CLINIC | Age: 69
End: 2024-12-12
Payer: COMMERCIAL

## 2024-12-12 ENCOUNTER — ANTICOAGULATION - WARFARIN VISIT (OUTPATIENT)
Dept: PHARMACY | Facility: HOSPITAL | Age: 69
End: 2024-12-12

## 2024-12-12 DIAGNOSIS — D22.5 MELANOCYTIC NEVUS OF TRUNK: ICD-10-CM

## 2024-12-12 DIAGNOSIS — I48.91 ATRIAL FIBRILLATION, UNSPECIFIED TYPE (MULTI): Primary | ICD-10-CM

## 2024-12-12 DIAGNOSIS — L30.9 DERMATITIS: ICD-10-CM

## 2024-12-12 DIAGNOSIS — L82.1 SEBORRHEIC KERATOSIS: ICD-10-CM

## 2024-12-12 DIAGNOSIS — L57.0 ACTINIC KERATOSIS: ICD-10-CM

## 2024-12-12 DIAGNOSIS — Z85.820 PERSONAL HISTORY OF MALIGNANT MELANOMA OF SKIN: ICD-10-CM

## 2024-12-12 DIAGNOSIS — L81.4 LENTIGO: ICD-10-CM

## 2024-12-12 DIAGNOSIS — D48.5 NEOPLASM OF UNCERTAIN BEHAVIOR OF SKIN: Primary | ICD-10-CM

## 2024-12-12 DIAGNOSIS — L82.0 INFLAMED SEBORRHEIC KERATOSIS: ICD-10-CM

## 2024-12-12 DIAGNOSIS — L57.8 DIFFUSE PHOTODAMAGE OF SKIN: ICD-10-CM

## 2024-12-12 PROCEDURE — 1159F MED LIST DOCD IN RCRD: CPT | Performed by: DERMATOLOGY

## 2024-12-12 PROCEDURE — 17110 DESTRUCTION B9 LES UP TO 14: CPT | Performed by: DERMATOLOGY

## 2024-12-12 PROCEDURE — 99214 OFFICE O/P EST MOD 30 MIN: CPT | Performed by: DERMATOLOGY

## 2024-12-12 PROCEDURE — 17004 DESTROY PREMAL LESIONS 15/>: CPT | Performed by: DERMATOLOGY

## 2024-12-12 PROCEDURE — 11301 SHAVE SKIN LESION 0.6-1.0 CM: CPT | Performed by: DERMATOLOGY

## 2024-12-12 PROCEDURE — 11302 SHAVE SKIN LESION 1.1-2.0 CM: CPT | Performed by: DERMATOLOGY

## 2024-12-12 RX ORDER — TRIAMCINOLONE ACETONIDE 1 MG/G
CREAM TOPICAL
Qty: 80 G | Refills: 1 | Status: SHIPPED | OUTPATIENT
Start: 2024-12-12

## 2024-12-12 ASSESSMENT — DERMATOLOGY PATIENT ASSESSMENT
DO YOU USE SUNSCREEN: OCCASIONALLY
DO YOU HAVE ANY NEW OR CHANGING LESIONS: NO
DO YOU USE A TANNING BED: NO

## 2024-12-12 ASSESSMENT — DERMATOLOGY QUALITY OF LIFE (QOL) ASSESSMENT: ARE THERE EXCLUSIONS OR EXCEPTIONS FOR THE QUALITY OF LIFE ASSESSMENT: NO

## 2024-12-12 NOTE — PATIENT INSTRUCTIONS
Your INR was in range at 2.5 (goal 2-3). We will have you continue your weekly regimen of 2.5mg MWF and 1.25 mg all other days. We will retest in 4 weeks.  If any questions arise do not hesitate to call us at [phone] M-F from 8:30am-4:30pm or at   [phone] after 5pm or on the weekends. Continue to monitor for any excess bleeding or bruising, especially for blood in your stool.

## 2024-12-12 NOTE — PROGRESS NOTES
We received a lab result from 12/11 with an INR of 2.5. He reports taking his warfarin as instructed and reports no missed doses. He reports no others changes in medications, no signs or symptoms of bleeding. Green intake remains consistent. Given his INR is in range, we will continue his weekly regimen of 2.5mg on MWF and 1.25mg all other days. We will recheck in INR in 4 weeks.

## 2024-12-13 ENCOUNTER — APPOINTMENT (OUTPATIENT)
Dept: DERMATOLOGY | Facility: CLINIC | Age: 69
End: 2024-12-13
Payer: COMMERCIAL

## 2024-12-13 NOTE — PROGRESS NOTES
"Subjective     Raman Lawrence is a 68 y.o. male who presents for the following: Skin Exam.  He notes a scaly bump on his left elbow, which has been present for several months and has been itching recently.  It has not changed in any other way, including in size, shape, or color, and it does not hurt or bleed.  He also notes red, scaly, itchy areas on the backs of his knees.  He denies any other new, changing, or concerning skin lesions since his last visit; no bleeding, itching, or burning lesions.      Review of Systems:  No other skin or systemic complaints other than what is documented elsewhere in the note.    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 Inocencia Snyder on 8/10/21 s/p staged wide local excision with peripheral margin control (\"slow Mohs surgery\") by Dr. Fagan on 9/14/21    - history of BCC on left lateral mid forearm diagnosed on 8/15/24 s/p ED&C on 11/4/24  - 2 BCCs on right anterior distal leg and left lateral posterior neck both diagnosed on 4/17/24 s/p ED&C on 5/2/24 and Mohs surgery by Dr. Gonzalez on 5/15/24, respectively  - SCC in situ right lateral distal forearm diagnosed on 8/14/23 s/p wide local excision with 5-mm margins by Dr. Mujica on 6/28/24  - BCC on right lateral upper cheek diagnosed on 12/1/22 s/p Mohs surgery by Dr. Gonzalez on 3/23/23  - BCC on left proximal forearm diagnosed on 4/7/22 s/p wide local excision with 5-mm margins by Dr. Gonzalez on " 7/13/22  - SCC in situ on right dorsal distal forearm diagnosed on 12/9/21 s/p wide local excision with 5-mm margins by Dr. Gonzalez on 4/13/22  - BCC on right dorsal mid-forearm diagnosed on 12/9/21 s/p Mohs surgery by Dr. Gonzalez on 3/10/22  - BCC on left temple diagnosed by Dr. Fagan on 9/14/21 s/p Mohs surgery by Dr. Fagan on 9/16/21    - AKs    - junctional dysplastic nevus with mild atypia on right upper back on 12/9/21  - dysplastic junctional nevi in actinically damaged skin on right lateral distal arm and left medial mid forearm both diagnosed on 4/7/22 and s/p re-excision with 3-mm margins by Dr. oPrtillo on 9/26/22 and re-excision with 5-mm margins by Dr. Gallegos on 10/3/22, respectively  - mildly dysplastic compound nevus on left lateral mid arm diagnosed on 8/4/22  - moderately dysplastic junctional nevus with an area of more significant architectural disorder on right medial dorsal mid forearm diagnosed on 12/1/22 s/p re-excision with 3-mm margins by Dr. Gallegos on 4/3/23  - combined melanocytic nevus with features of dysplastic nevus (mild atypia) on left medial superior upper back and compound dysplastic nevus with mild atypia on left medial upper back both diagnosed on 4/6/23  - mildly dysplastic junctional nevus on right lateral inferior posterior neck diagnosed on 8/14/23  - inflamed mildly dysplastic compound nevus on right upper back diagnosed on 12/18/23  - 2 mildly dysplastic compound nevi on right lateral proximal arm and right posterior distal arm both diagnosed on 4/17/24    - reported history of BCC on left temple treated over 20 years ago with recurrence as detailed above  - reported history of multiple dysplastic nevi diagnosed by his previous outside Dermatologist, Dr. Inocencia Snyder    Family History:    No family history of melanoma or skin cancer    Social History:    The patient states he lives in [place], [place], and works as a  for [employer] at the intersection  of [facility], and one of his co-workers is a patient in our office as well    Allergies:  Halothane    Current Medications / CAM's:    Current Outpatient Medications:     acetaminophen (Tylenol) 325 mg tablet, 2 tablet EVERY 6 HOURS (route: oral), Disp: , Rfl:     atorvastatin (Lipitor) 10 mg tablet, Take 1 tablet (10 mg) by mouth once daily., Disp: 90 tablet, Rfl: 3    clindamycin (Cleocin T) 1 % lotion, 1 Application, Disp: , Rfl:     dilTIAZem CD (Cardizem CD) 240 mg 24 hr capsule, Take 1 capsule (240 mg) by mouth once daily., Disp: 90 capsule, Rfl: 3    lisinopril 5 mg tablet, take 1 tablet by mouth once daily, Disp: 90 tablet, Rfl: 1    metoprolol succinate XL (Toprol-XL) 25 mg 24 hr tablet, Take 1 tablet (25 mg) by mouth once daily., Disp: 90 tablet, Rfl: 3    metroNIDAZOLE (Metrocream) 0.75 % cream, 0 (Patient not taking: Reported on 11/6/2024), Disp: , Rfl:     triamcinolone (Kenalog) 0.025 % cream, Apply twice daily to affected areas of legs (avoid face, groin, body folds) for 2 weeks, then taper to twice daily on weekends only; repeat every 6-8 weeks as needed for flares, Disp: 80 g, Rfl: 2    triamcinolone (Kenalog) 0.1 % cream, Apply twice daily to affected areas of legs (avoid face, groin, body folds) for 2 weeks, then taper to twice daily on weekends only; repeat every 6-8 weeks as needed for flares, Disp: 80 g, Rfl: 1    warfarin (Coumadin) 2.5 mg tablet, Take 1 tablet (2.5mg) by mouth on Monday, Wednesday, Friday and 1/2 tablet (1.25mg) by mouth all other days or as directed by Coumadin clinic., Disp: 60 tablet, Rfl: 3     Objective   Well appearing patient in no apparent distress; mood and affect are within normal limits.    A full examination was performed including scalp, face, eyes, ears, nose, lips, neck, chest, axillae, abdomen, back, bilateral upper extremities, and bilateral lower extremities. All findings within normal limits unless otherwise noted below.    Assessment/Plan    1. Neoplasm of uncertain behavior of skin (2)  Left Posterior Proximal Shoulder  6 mm pink, shiny papule           Shave removal    Lesion length (cm):  0.6  Margin per side (cm):  0  Lesion diameter (cm):  0.6  Informed consent: discussed and consent obtained    Timeout: patient name, date of birth, surgical site, and procedure verified    Procedure prep:  Patient was prepped and draped  Anesthesia: the lesion was anesthetized in a standard fashion    Anesthetic:  1% lidocaine w/ epinephrine 1-100,000 local infiltration  Instrument used: flexible razor blade    Hemostasis achieved with: aluminum chloride    Outcome: patient tolerated procedure well    Post-procedure details: sterile dressing applied and wound care instructions given    Dressing type: bandage and petrolatum      Staff Communication: Dermatology Local Anesthesia: 1 % Lidocaine / Epinephrine - Amount:0.5ml    Specimen 1 - Dermatopathology- DERM LAB  Differential Diagnosis: BCC v IDN  Check Margins Yes/No?:    Comments:    Dermpath Lab: Routine Histopathology (formalin-fixed tissue)    Left Upper Back  8 mm dark brown pigmented, asymmetric macule with an asymmetric pigment network and irregular borders           Shave removal    Lesion length (cm):  0.8  Margin per side (cm):  0.2  Lesion diameter (cm):  1.2  Informed consent: discussed and consent obtained    Timeout: patient name, date of birth, surgical site, and procedure verified    Procedure prep:  Patient was prepped and draped  Anesthesia: the lesion was anesthetized in a standard fashion    Anesthetic:  1% lidocaine w/ epinephrine 1-100,000 local infiltration  Instrument used: flexible razor blade    Hemostasis achieved with: aluminum chloride    Outcome: patient tolerated procedure well    Post-procedure details: sterile dressing applied and wound care instructions given    Dressing type: bandage and petrolatum      Staff Communication: Dermatology Local Anesthesia: 1 % Lidocaine /  Epinephrine - Amount:0.5ml    Specimen 2 - Dermatopathology- DERM LAB  Differential Diagnosis: lentigo v AMH v DN  Check Margins Yes/No?:    Comments:    Dermpath Lab: Routine Histopathology (formalin-fixed tissue)    2. Actinic keratosis (17)  Scalp (17)  Scattered on the patient's scalp, there are multiple erythematous, gritty, scaly macules     Actinic Keratoses -scattered on scalp.  The pre-cancerous nature of these lesions and treatment options were discussed with the patient today.  At this time, I recommend treatment with liquid nitrogen cryotherapy.  The patient expressed understanding, is in agreement with this plan, and wishes to proceed with cryotherapy today.    Destr of lesion - Scalp (17)  Complexity: simple    Destruction method: cryotherapy    Informed consent: discussed and consent obtained    Lesion destroyed using liquid nitrogen: Yes    Cryotherapy cycles:  1  Outcome: patient tolerated procedure well with no complications    Post-procedure details: wound care instructions given      3. Inflamed seborrheic keratosis  Left Elbow - Posterior  On the patient's left elbow, there is a 7 mm erythematous and light brown-colored, hyperkeratotic, stuck-on appearing papule with a surrounding rim of erythema    Inflamed Seborrheic Keratosis - left elbow.  The benign nature of this lesion was discussed with the patient today and reassurance provided.  Given the history the patient provides of frequent irritation and associated symptoms as well as its inflamed appearance on exam today, I offered to treat this lesion with liquid nitrogen cryotherapy.  The patient expressed understanding, is in agreement with this plan, and wishes to proceed with cryotherapy today.    Destr of lesion - Left Elbow - Posterior  Complexity: simple    Destruction method: cryotherapy    Informed consent: discussed and consent obtained    Lesion destroyed using liquid nitrogen: Yes    Cryotherapy cycles:  2  Outcome: patient  tolerated procedure well with no complications    Post-procedure details: wound care instructions given      4. Dermatitis  Left Popliteal Fossa  On the patient's bilateral popliteal fossae, the left worse than the right, there are erythematous, scaly, slightly lichenified, thin plaques    Eczema -flare on bilateral popliteal fossae.  The potentially chronic and intermittently flaring nature of this condition and treatment options were discussed extensively with the patient today.  At this time, I recommend topical steroid therapy with Triamcinolone 0.1% cream, which the patient was instructed to apply twice daily to the affected areas of his popliteal fossae (avoid face, groin, body folds) for the next 2 weeks, followed by taper to twice daily on weekends only for persistent areas; the patient may repeat treatment in a 2-week burst-and-taper fashion every 6-8 weeks as needed for future flares.  I also emphasized the importance of dry, sensitive skin care, including the use of a mild soap, such as Dove, and frequent and aggressive moisturization, at least twice daily and immediately following showers or baths, with recommended over-the-counter moisturizing creams, such as Eucerin, Cetaphil, Cerave, or Aveeno, or Vaseline or Aquaphor ointments.  The risks, benefits, and side effects of this medication, including possible skin atrophy with overuse of topical steroids, were discussed.  The patient expressed understanding and is in agreement with this plan.    triamcinolone (Kenalog) 0.1 % cream - Left Popliteal Fossa  Apply twice daily to affected areas of legs (avoid face, groin, body folds) for 2 weeks, then taper to twice daily on weekends only; repeat every 6-8 weeks as needed for flares    Related Medications  triamcinolone (Kenalog) 0.025 % cream  Apply twice daily to affected areas of legs (avoid face, groin, body folds) for 2 weeks, then taper to twice daily on weekends only; repeat every 6-8 weeks as needed  for flares    5. Melanocytic nevus of trunk  Scattered on the patient's face, neck, trunk, and extremities, there are multiple small, round- to oval-shaped, brown-pigmented and pink-colored, symmetric, uniform-appearing macules and dome-shaped papules    Clinically benign- to slightly atypical-appearing nevi - the clinically benign- to slightly atypical-appearing nature of the remainder of the patient's nevi was discussed with the patient today.  None of the patient's nevi, with the exception of the one noted above, meet threshold for biopsy today.  I emphasized the importance of performing monthly self-skin exams using the ABCDs of monitoring moles, which were reviewed with the patient today and an informational hand-out provided.  I also emphasized the importance of sun avoidance and sun protection with daily sunscreen use.    6. Seborrheic keratosis  Scattered on the patient's face, neck, trunk, and extremities, there are multiple tan- to light brown-colored, hyperkeratotic, stuck-on appearing papules of varying size and shape    Seborrheic Keratoses - the benign nature of these lesions was discussed with the patient today and reassurance provided.  No treatment is medically indicated for the noninflamed SKs at this time.    7. Lentigo  Photodistributed  Multiple tan- to light brown-colored, round- to oval-shaped, symmetric and uniform-appearing macules and small patches consistent with lentigines scattered in sun-exposed areas.    Solar Lentigines and photodamage.  The clinically benign-appearing nature of these lesions and their relation to chronic sun exposure were discussed with the patient today and reassurance provided.  None of these lesions meet threshold for biopsy today, and thus no treatment is medically indicated for these lesions at this time.  The signs and symptoms of skin cancer were reviewed and the patient was advised to practice sun protection and sun avoidance, use daily sunscreen, and perform  regular self skin exams.  The patient was instructed to monitor these lesions for any changes, such as in size, shape, or color, or associated symptoms and to call our office to schedule a return visit for re-evaluation if any such changes or symptoms are noticed in the future.  The patient expressed understanding and is in agreement with this plan.    8. Personal history of malignant melanoma of skin  The sites of prior melanoma excision were examined.  There is no evidence of recurrent growth or pigmentation or recurrent disease, satellite papules, or nodules on exam today.    History of melanoma, lentigo maligna type, nonmelanoma skin cancers, dysplastic nevi, and actinic keratoses and photodamage.  There is no evidence of local, regional, or distant recurrent disease or any new primary melanomas on exam today.  I emphasized the importance of continuing to perform monthly self-skin and lymph node exams using the ABCDs of monitoring moles, which were reviewed with the patient, as well as the importance of sun avoidance and sun protection with daily sunscreen use.  I will have the patient return to our office in 4-6 months, pending the above biopsy results, for routine melanoma follow-up and total body skin exam, and the patient was instructed to call our office should the patient notice any new, changing, symptomatic, or otherwise concerning skin lesions before then.  The patient expressed understanding and is in agreement with this plan.    9. Diffuse photodamage of skin  Photodistributed  Diffuse photodamage with actinic changes with telangiectasia and mottled pigmentation in sun-exposed areas.    Photodamage.  The signs and symptoms of skin cancer were reviewed and the patient was advised to practice sun protection and sun avoidance, use daily sunscreen, and perform regular self skin exams.  Sun protection was discussed, including avoiding the mid-day sun, wearing a sunscreen with SPF at least 50, and stressing  the need for reapplication of sunscreen and applying more than they think they need.

## 2024-12-19 DIAGNOSIS — I48.21 PERMANENT ATRIAL FIBRILLATION (MULTI): ICD-10-CM

## 2024-12-19 RX ORDER — METOPROLOL SUCCINATE 25 MG/1
25 TABLET, EXTENDED RELEASE ORAL DAILY
Qty: 90 TABLET | Refills: 3 | Status: SHIPPED | OUTPATIENT
Start: 2024-12-19

## 2025-01-09 ENCOUNTER — LAB (OUTPATIENT)
Dept: LAB | Facility: LAB | Age: 70
End: 2025-01-09
Payer: COMMERCIAL

## 2025-01-09 DIAGNOSIS — I48.91 ATRIAL FIBRILLATION, UNSPECIFIED TYPE (MULTI): ICD-10-CM

## 2025-01-09 LAB
INR PPP: 2.3 (ref 0.9–1.1)
PROTHROMBIN TIME: 25.7 SECONDS (ref 9.8–12.8)

## 2025-01-09 PROCEDURE — 85610 PROTHROMBIN TIME: CPT

## 2025-01-10 ENCOUNTER — ANTICOAGULATION - WARFARIN VISIT (OUTPATIENT)
Dept: PHARMACY | Facility: HOSPITAL | Age: 70
End: 2025-01-10
Payer: COMMERCIAL

## 2025-01-10 DIAGNOSIS — I48.91 ATRIAL FIBRILLATION, UNSPECIFIED TYPE (MULTI): Primary | ICD-10-CM

## 2025-01-10 NOTE — PROGRESS NOTES
1/10/2025: We received a lab result from 1/9 with an INR of 2.3. He reports taking his warfarin as instructed and reports no missed doses. He reports no others changes in medications, no signs or symptoms of bleeding. Green intake remains consistent. Given his INR is in range, we will continue his weekly regimen of 2.5mg on MWF and 1.25mg all other days. We will recheck in INR in 4 weeks.

## 2025-01-10 NOTE — PATIENT INSTRUCTIONS
Your INR was in range at 2.3 (goal 2-3). We will have you continue your weekly regimen of 2.5mg MWF and 1.25 mg all other days. We will retest in 4 weeks.  If any questions arise do not hesitate to call us at [phone] M-F from 8:30am-4:30pm or at   [phone] after 5pm or on the weekends. Continue to monitor for any excess bleeding or bruising, especially for blood in your stool.

## 2025-01-27 DIAGNOSIS — I10 PRIMARY HYPERTENSION: ICD-10-CM

## 2025-01-27 NOTE — TELEPHONE ENCOUNTER
----- Message from Nurse Noemy CHRISTIANSON sent at 1/27/2025 12:59 PM EST -----  Regarding: Refill  Patient is requesting a refill of lisinopril to be sent to [facility]

## 2025-01-28 RX ORDER — LISINOPRIL 5 MG/1
5 TABLET ORAL DAILY
Qty: 90 TABLET | Refills: 1 | Status: SHIPPED | OUTPATIENT
Start: 2025-01-28

## 2025-02-12 ENCOUNTER — ANTICOAGULATION - WARFARIN VISIT (OUTPATIENT)
Dept: PHARMACY | Facility: HOSPITAL | Age: 70
End: 2025-02-12
Payer: COMMERCIAL

## 2025-02-12 DIAGNOSIS — I48.91 ATRIAL FIBRILLATION, UNSPECIFIED TYPE (MULTI): Primary | ICD-10-CM

## 2025-02-12 LAB
INR PPP: 1.9
PROTHROMBIN TIME: 19.5 SEC (ref 9–11.5)

## 2025-02-12 NOTE — PATIENT INSTRUCTIONS
Your INR was low at 1.9. We will have you take 3.75mg today and then continue your weekly regimen of 2.5mg MWF and 1.25 mg all other days. We will retest in 2 weeks.    If any questions arise do not hesitate to call us at [phone] M-F from 8:30am-4:30pm or at   [phone] after 5pm or on the weekends. Continue to monitor for any excess bleeding or bruising, especially for blood in your stool.

## 2025-02-12 NOTE — PROGRESS NOTES
We received a lab result from 2/11 with an INR of 1.9. He reports taking his warfarin as instructed and reports no missed doses. He reports no others changes in medications, no signs or symptoms of bleeding. Green intake remains consistent. He stated he feels like he is exercising a little more lately than before.  Given his INR is low, we will have him take 3.75mg tonight and then continue his weekly regimen of 2.5mg on MWF and 1.25mg all other days. We will recheck in INR in 2 weeks. If his INR  is still low then we will increase his weekly regimen.    Paranoid schizophrenia

## 2025-02-25 ENCOUNTER — APPOINTMENT (OUTPATIENT)
Dept: DERMATOLOGY | Facility: CLINIC | Age: 70
End: 2025-02-25
Payer: COMMERCIAL

## 2025-02-25 VITALS — HEART RATE: 52 BPM | DIASTOLIC BLOOD PRESSURE: 70 MMHG | SYSTOLIC BLOOD PRESSURE: 143 MMHG

## 2025-02-25 DIAGNOSIS — C44.619 BASAL CELL CARCINOMA (BCC) OF SKIN OF LEFT UPPER EXTREMITY INCLUDING SHOULDER: ICD-10-CM

## 2025-02-25 PROCEDURE — 99214 OFFICE O/P EST MOD 30 MIN: CPT | Performed by: DERMATOLOGY

## 2025-02-25 PROCEDURE — 13101 CMPLX RPR TRUNK 2.6-7.5 CM: CPT | Performed by: DERMATOLOGY

## 2025-02-25 PROCEDURE — 17313 MOHS 1 STAGE T/A/L: CPT | Performed by: DERMATOLOGY

## 2025-02-25 NOTE — PROGRESS NOTES
Mohs Surgery Operative Note    Date of Surgery:  2/25/2025  Surgeon:  Manpreet Gonzalez MD  Office Location: [address] [address]  Dept: [phone]  Dept Fax: [phone]  Referring Provider: Brendan Cobb MD  [address] [place] [address] Dr Keila Quiroz [place], [address] [address]      Assessment/Plan   Pre-procedure:   Obtained informed consent: written from patient  The surgical site was identified and confirmed with the patient.     Intra-operative:   Audible time out called at : 1:17 PM 02/25/25  by: Jose Martin MA   Verified patient name, birthdate, site, specimen bottle label & requisition.    The planned procedure(s) was again reviewed with the patient. The risks of bleeding, infection, nerve damage and scarring were reviewed. Written authorization was obtained. The patient identity, surgical site, and planned procedure(s) were verified. The provider acted as both surgeon and pathologist.     Basal cell carcinoma (BCC) of skin of left upper extremity including shoulder  Left Posterior Proximal Shoulder    Mohs surgery    Consent obtained: written    Universal Protocol:  Procedure explained and questions answered to patient or proxy's satisfaction: Yes    Test results available and properly labeled: Yes    Pathology report reviewed: Yes    External notes reviewed: Yes    Photo or diagram used for site identification: Yes    Site/side marked: Yes    Slide independently reviewed by Mohs surgeon: Yes    Immediately prior to procedure a time out was called: Yes    Patient identity confirmed: verbally with patient  Preparation: Patient was prepped and draped in usual sterile fashion      Anticoagulation:  Is the patient taking prescription anticoagulant and/or aspirin prescribed/recommended by a physician? Yes    Was the anticoagulation regimen changed prior to Mohs? No      Anesthesia:  Anesthesia method: local infiltration  Local  anesthetic: lidocaine 2% WITH epi    Procedure Details:  Case ID Number: [id number]  Biopsy accession number: [id number]  Date of biopsy: 12/12/2024  Pre-Op diagnosis: basal cell carcinoma  BCC subtype: infiltrative and nodular  Surgical site (from skin exam): Left Posterior Proximal Shoulder  Pre-operative length (cm): 1.6  Pre-operative width (cm): 1.4  Indications for Mohs surgery: aggressive pathology  Previously treated? No      Micrographic Surgery Details:  Post-operative length (cm): 1.7  Post-operative width (cm): 1.6  Number of Mohs stages: 1    Stage 1     Comments: The patient was brought into the operating room and placed in the procedure chair in the appropriate position.  The area positive by previous biopsy was identified and confirmed with the patient. The area of clinically obvious tumor was debulked using a curette and/or scalpel as needed. An incision was made following the Mohs approach through the skin. The specimen was taken to the lab, divided into 2 piece(s) and appropriately chromacoded and processed.       Tumor features identified on Mohs section: no tumor identified    Depth of defect: subcutaneous fat    Patient tolerance of procedure: tolerated well, no immediate complications    Reconstruction:  Was the defect reconstructed? Yes    Was reconstruction performed by the same Mohs surgeon? Yes    When was reconstruction performed? same day  Type of reconstruction: linear  Linear reconstruction: complex  Length of linear repair (cm): 4.5  Subcutaneous Layers (Deep Stitches)   Suture size:  3-0  Suture type:  Vicryl  Stitches:  Buried vertical mattress  Fine/surface layer approximation (top stitches)   Fine approximation suture size: 6-0.  Superficial suture type: Express Gut.  Stitches: simple running    Hemostasis achieved with: suture, pressure and electrodesiccation  Outcome: patient tolerated procedure well with no complications    Post-procedure details: sterile dressing applied and  wound care instructions given    Dressing type: pressure dressing          Complex Linear Repair - Wide Undermining:  Given the location and size of the defect, it was determined that a complex layered linear closure was required to restore normal anatomy and function. The repair was considered complex because extensive undermining was required and performed. The amount of undermining performed was greater than the maximum width of the defect as measured perpendicular to the closure line along at least one entire edge of the defect. Standing cutaneous cones were removed using Burow's triangles. The wound edges were brought into close approximation with buried vertical mattress sutures. The remainder of the wound was then closed with epidermal top sutures.    The final repair measured 4.5 cm              Wound care was discussed, and the patient was given written post-operative wound care instructions.      The patient will follow up with Manpreet Gonzalez MD as needed for any post operative problems or concerns, and will follow up with their primary dermatologist as scheduled.

## 2025-02-25 NOTE — PROGRESS NOTES
Office Visit Note  Date: 2/25/2025  Surgeon:  Manpreet Gonzalez MD  Office Location: [address]   [facility] [address]  [facility] [address]  Dept: [phone]  Dept Fax: [phone]  Referring Provider: Brendan Cobb MD  [address] [place] [address]   [facility] [address]  [place] [id number]    Subjective   Raman Lawrence is a 69 y.o. male who presents for the following: MOHS Surgery    According to the patient, the lesion has been present for approximately 6 months at the time of diagnosis.  The lesion is not causing symptoms.  The lesion has not been treated previously.    The patient does not have a pacemaker / defibrillator.  The patient does not have a heart valve / joint replacement.    The patient is on blood thinners.  The patient does not have a history of hepatitis B or C.  The patient does not have a history of HIV.  The patient does not have a history of immunosuppression (e.g. organ transplantation, malignancy, medications)    Review of Systems:  No other skin or systemic complaints other than what is documented elsewhere in the note.    MEDICAL HISTORY: clinically relevant history including significant past medical history, medications and allergies was reviewed and documented in Epic.    Objective   Well appearing patient in no apparent distress; mood and affect are within normal limits.  Vital signs: See record.  Noted on the Left Posterior Proximal Shoulder  Is a 1.6 x 1.4 cm scar        The patient confirmed the identified site.    Discussion:  The nature of the diagnosis was explained. The lesion is a skin cancer.  It has a risk of local growth and distant spread. The condition is associated with sun exposure.  Warning signs of non-melanoma skin cancer discussed. Patient was instructed to perform monthly self skin examination.  We recommended that the patient have regular full skin exams given an increased risk of subsequent skin cancers. The patient was  instructed to use sun protective behaviors including use of broad spectrum sunscreens and sun protective clothing to reduce risk of skin cancers.      Risks, benefits, side effects of Mohs surgery were discussed with patient and the patient voiced understanding.  It was explained that even though the cure rate of Mohs is very high it is not 100%. Risks of surgery including but not limited to bleeding, infection, numbness, nerve damage, and scar were reviewed.  Discussion included wound care requirements, activity restrictions, likely scar outcome and time to heal.     After Mohs surgery, the defect may need to be repaired surgically and the scar may be longer than the original lesion.  Reconstruction options, risks, and benefits were reviewed including second intention healing, linear repair (4-1 ratio was explained), local flaps, skin grafts, cartilage grafts and interpolation flaps (the need for multiple surgeries was explained). Possible outcomes were reviewed including likely scar appearance, failure of flap survival, infection, bleeding and the need for revision surgery.     The pathology was reviewed, the photograph was reviewed, and the referring physician's note was reviewed.    Patient elected for Mohs surgery.    The patient has a basal cell carcinoma.  The pathology was reviewed, the photograph was reviewed, and the referring physicians note was reviewed.  Multiple treatment options including mohs surgery (which has moderate risk of morbidity) were reviewed.    Medical Decision Making  Column 1- Basal Cell Carcinoma (1 acute uncomplicated illness- Low)  Column 2- 3 tests reviewed (pathology, photograph, refering physician notes- Moderate)  Column 3- Modertate risk of morbidity from additional treatment- mohs surgery- Moderate)    Overall Moderate MDM

## 2025-03-05 ENCOUNTER — ANTICOAGULATION - WARFARIN VISIT (OUTPATIENT)
Dept: PHARMACY | Facility: HOSPITAL | Age: 70
End: 2025-03-05
Payer: COMMERCIAL

## 2025-03-05 DIAGNOSIS — I48.91 ATRIAL FIBRILLATION, UNSPECIFIED TYPE (MULTI): Primary | ICD-10-CM

## 2025-03-05 LAB
INR PPP: 1.7
PROTHROMBIN TIME: 17 SEC (ref 9–11.5)

## 2025-03-05 NOTE — PROGRESS NOTES
We received a lab result from 3/4 with an INR of 1.7. He reports taking his warfarin as instructed and reports no missed doses. He reports no others changes in medications, no signs or symptoms of bleeding. Green intake remains consistent and is typically very infrequent. He stated he feels like he is exercising a little more lately than before the past month.  Given his INR is low despite a boost a few weeks ago, we will have him take 3.75mg tonight and then increase his weekly regimen of 1.25mg Tues, Thurs, Sat and 2.5mg all other days. We will recheck in INR in 2 weeks per patient request.

## 2025-03-05 NOTE — PATIENT INSTRUCTIONS
Your INR was low at 1.7. We will have you take 3.75mg today and then increase your weekly regimen of 1.25 mg Tues, Thurs, Sat and 2.5mg all other days. We will retest in 2 weeks per patient request.  If any questions arise do not hesitate to call us at [phone] M-F from 8:30am-4:30pm or at   [phone] after 5pm or on the weekends. Continue to monitor for any excess bleeding or bruising, especially for blood in your stool.

## 2025-04-02 ENCOUNTER — ANTICOAGULATION - WARFARIN VISIT (OUTPATIENT)
Dept: PHARMACY | Facility: HOSPITAL | Age: 70
End: 2025-04-02
Payer: COMMERCIAL

## 2025-04-02 DIAGNOSIS — I48.91 ATRIAL FIBRILLATION, UNSPECIFIED TYPE (MULTI): Primary | ICD-10-CM

## 2025-04-02 LAB
INR PPP: 2.1
PROTHROMBIN TIME: 21.3 SEC (ref 9–11.5)

## 2025-04-02 NOTE — PATIENT INSTRUCTIONS
Your INR was in range at 2.1.  Please continue your weekly regimen of 1.25 mg Tues, Thurs, Sat and 2.5mg all other days. Retest again in 4 weeks.   If any questions arise do not hesitate to call us at [phone] M-F from 8:30am-4:30pm or at   [phone] after 5pm or on the weekends. Continue to monitor for any excess bleeding or bruising, especially for blood in your stool.

## 2025-04-02 NOTE — PROGRESS NOTES
We received a lab result from 4/1 with an INR of 2.1. Mr. Lawrence reports no changes in his diet or medications. Also reports no signs or symptoms of bleeding/bruising. No extra/missed doses of warfarin.   Given his INR is in range, we will have him continue his weekly regimen of 1.25mg Tues, Thurs, Sat and 2.5mg all other days. We will follow up after he tests again in 4 weeks.

## 2025-04-17 NOTE — PROGRESS NOTES
[facility] Heart and Vascular Cardiology    Patient Name: Raman Lawrence  Patient : 1955    Scribe Attestation  By signing my name below, IShikha Scribe attest that this documentation has been prepared under the direction and in the presence of Pantera Fong DO.    Scribe Attestation  By signing my name below, Myriam ROSARIO Scribe attest that this documentation has been prepared under the direction and in the presence of Pantera Fong DO.     Physician Attestation  Pantera ROSARIO DO, personally performed the services described in the documentation as scribed by Myriam Peñaloza in my presence, and confirm it is both accurate and complete.    Reason for visit:  This is a 69-year-old male here for follow-up regarding permanent atrial fibrillation, anticoagulation with warfarin followed by the anticoagulation clinic, HFpEF, history of primum ASD with prior surgical repair as well as mitral valve repair done in , moderate mitral valve regurgitation, hypertension, and dyslipidemia.     HPI:  This is a 69-year-old male here for follow-up regarding permanent atrial fibrillation, anticoagulation with warfarin followed by the anticoagulation clinic, HFpEF, history of primum ASD with prior surgical repair as well as mitral valve repair done in , moderate mitral valve regurgitation, hypertension, and dyslipidemia.  The patient was last evaluated by me in 2024.  At that visit I ordered blood work including BMP/CBC/magnesium/BNP, ordered additional blood work including CMP/lipid/magnesium/CBC/BNP to be drawn in 6 months, and asked the patient to follow-up in 6 months and sooner if necessary.  CMP done on 2025 showed normal sodium and potassium with a serum creatinine of 1.06, serum magnesium of 2.2, and BNP of 59.  Lipid panel showed an HDL of 34, LDL of 82, and triglycerides of 68.  ECG done today showed atrial fibrillation, possible anterior infarct age  "undetermined, and a heart rate of 52 bpm.  The patient reports that he has been feeling generally well from the cardiac standpoint.  He reports of a couple incidents where he experienced lightheadedness for the first 5-6 steps after driving for about an hour.  He reports that his compression stockings are \"too tight\" at times.  He denies any new chest pain, shortness of breath, and palpitations.  He states that he takes all of his medications as prescribed.  During my exam, he was resting comfortably on the exam table.            Assessment/Plan:   1. Permanent atrial fibrillation  The patient has a history of permanent atrial fibrillation and is currently on warfarin for thromboembolism prophylaxis.  Warfarin dosing and INR monitoring is being managed by the anticoagulation clinic through the pharmacy.  Patient should continue diltiazem for heart rate control.  ECG done today showed atrial fibrillation, possible anterior infarct age undetermined, and a heart rate of 52 bpm.   He denies chest pain, palpitations or shortness of breath.   He reports of a couple incidents where he experienced lightheadedness for the first 5-6 steps after driving for about an hour.  Echocardiogram done in September 2024 showed normal left ventricular systolic function with an ejection fraction of 56%, normal right ventricular systolic function, biatrial dilatation, moderate mitral valve regurgitation, and moderate tricuspid valve regurgitation.  Recent lab works as noted in the HPI.   Echocardiogram and lab works as noted below will be done in 6 months prior to his next visit.   Follow up in 6 months and sooner if necessary.      2. Anticoagulation with warfarin  The patient is on anticoagulation with warfarin for permanent atrial fibrillation.  Warfarin dosing and INR monitoring is being managed by the anticoagulation clinic through the pharmacy.  Recent lab works as noted in the HPI.   Lab works as noted below will be done in 6 months " prior to his next visit.      3.  HFpEF  The patient has HFpEF.  Echocardiogram done in September 2024 showed normal left ventricular systolic function with an ejection fraction of 56%, normal right ventricular systolic function, biatrial dilatation, moderate mitral valve regurgitation, and moderate tricuspid valve regurgitation.  He does not appear significantly volume overloaded on exam today.  He should continue his current cardiac medications.  Recent lab works as noted in the HPI.   Echocardiogram and lab works as noted below will be done in 6 months prior to his next visit.   I discussed with him the importance of following a low-sodium heart healthy diet, wearing compression stockings and elevating legs when seated.   Follow up in 6 months and sooner if necessary.      4. History of primum ASD  The patient has a history of history of primum ASD with prior surgical repair as well as mitral valve repair done in 1980 and 1981.   Echocardiogram done in September 2024 showed normal left ventricular systolic function with an ejection fraction of 56%, normal right ventricular systolic function, biatrial dilatation, moderate mitral valve regurgitation, and moderate tricuspid valve regurgitation.  Echocardiogram will be updated in 6 months.      5. Mitral valve regurgitation  The patient has a history of moderate mitral valve regurgitation.  The patient has a history of history of primum ASD with prior surgical repair as well as mitral valve repair done in 1980 and 1981.   Echocardiogram done in September 2024 showed normal left ventricular systolic function with an ejection fraction of 56%, normal right ventricular systolic function, biatrial dilatation, moderate mitral valve regurgitation, and moderate tricuspid valve regurgitation.  Echocardiogram will be updated in 6 months.      6. Hypertension  The patient has a history of hypertension which appears controlled on exam today.  He should continue his current  antihypertensive medications and monitor his blood pressure at home.     7. Dyslipidemia  Lipid panel done on 4/30/2025 showed an LDL cholesterol of 82 and triglycerides of 68 while on atorvastatin 10 mg daily.   Please see lifestyle recommendations below.        Orders:   CBC,  BMP/BNP/CBC/magnesium in 6 months,   Echocardiogram in 6 months,   CAC,   Follow-up in 6 months.    Lifestyle Recommendations  I recommend a whole-food plant-based diet, an eating pattern that encourages the consumption of unrefined plant foods (such as fruits, vegetables, tubers, whole grains, legumes, nuts and seeds) and discourages meats, dairy products, eggs and processed foods.     The AHA/ACC recommends that the patient consume a dietary pattern that emphasizes intake of vegetables, fruits, and whole grains; includes low-fat dairy products, poultry, fish, legumes, non-tropical vegetable oils, and nuts; and limits intake of sodium, sweets, sugar-sweetened beverages, and red meats.  Adapt this dietary pattern to appropriate calorie requirements (a 500-750 kcal/day deficit to loose weight), personal and cultural food preferences, and nutrition therapy for other medical conditions (including diabetes).  Achieve this pattern by following plans such as the [place] Mediterranean, DASH dietary pattern, or AHA diet.     Engage in 2 hours and 30 minutes per week of moderate-intensity physical activity, or 1 hour and 15 minutes (75 minutes) per week of vigorous-intensity aerobic physical activity, or an equivalent combination of moderate and vigorous-intensity aerobic physical activity. Aerobic activity should be performed in episodes of at least 10 minutes preferably spread throughout the week.     Adhering to a heart healthy diet, regular exercise habits, avoidance of tobacco products, and maintenance of a healthy weight are crucial components of their heart disease risk reduction.     Any positive review of systems not specifically addressed in  the office visit today should be evaluated and treated by the patients primary care physician or in an emergency department if necessary     Patient was notified that results from ordered tests will be called to the patient if it changes current management; it will otherwise be discussed at a future appointment and available on [facility].     Thank you for allowing me to participate in the care of this patient.        This document was generated using the assistance of voice recognition software. If there are any errors of spelling, grammar, syntax, or meaning; please feel free to contact me directly for clarification.    Past Medical History:  He has a past medical history of ASD (atrial septal defect) ([facility]), Cardiomyopathy, Chronic atrial fibrillation with RVR (Multi), Dyslipidemia, Fatigue, Hypertension, Mitral valve regurgitation, Other specified health status, Personal history of other diseases of the circulatory system, Personal history of other diseases of the circulatory system, Personal history of other diseases of the circulatory system, Personal history of other endocrine, nutritional and metabolic disease, and Warfarin anticoagulation.    Past Surgical History:  He has a past surgical history that includes Cardiac surgery (10/12/2015); Appendectomy (10/12/2015); Other surgical history (10/12/2015); Other surgical history (10/12/2015); Other surgical history (10/12/2015); Other surgical history (12/30/2020); and Other surgical history (06/20/2019).      Social History:  He reports that he has never smoked. He has never used smokeless tobacco. He reports that he does not currently use alcohol. He reports that he does not use drugs.    Family History:  Family History  Problem Relation Name Age of Onset    No Known Problems Mother      Heart attack Father          Allergies:  Halothane    Outpatient Medications:  Current Outpatient Medications   Medication Instructions    acetaminophen (Tylenol) 325 mg  "tablet 2 tablet EVERY 6 HOURS (route: oral)    atorvastatin (LIPITOR) 10 mg, oral, Daily    clindamycin (Cleocin T) 1 % lotion 1 Application    dilTIAZem CD (CARDIZEM CD) 240 mg, oral, Daily    lisinopril 5 mg, oral, Daily    metoprolol succinate XL (TOPROL-XL) 25 mg, oral, Daily    metroNIDAZOLE (Metrocream) 0.75 % cream 0    triamcinolone (Kenalog) 0.025 % cream Apply twice daily to affected areas of legs (avoid face, groin, body folds) for 2 weeks, then taper to twice daily on weekends only; repeat every 6-8 weeks as needed for flares    triamcinolone (Kenalog) 0.1 % cream Apply twice daily to affected areas of legs (avoid face, groin, body folds) for 2 weeks, then taper to twice daily on weekends only; repeat every 6-8 weeks as needed for flares    warfarin (Coumadin) 2.5 mg tablet Take 1 tablet (2.5mg) by mouth on Monday, Wednesday, Friday and 1/2 tablet (1.25mg) by mouth all other days or as directed by Coumadin clinic.        ROS:  A 14 point review of systems was done and is negative other than as stated in HPI    Vitals:      11/15/2023     3:50 PM 4/24/2024     3:35 PM 5/7/2024     8:35 PM 5/15/2024     8:13 AM 8/27/2024     8:52 AM 11/6/2024    12:39 PM 2/25/2025    12:59 PM   Vitals   Systolic 131 140 150 122 118 120 143   Diastolic 90 90 82 77 68 70 70   BP Location      Left arm    Heart Rate  69 80 49 68 52 52   Temp   36.8 °C (98.2 °F)       Resp   16  14     Height  1.88 m (6' 2\") 1.88 m (6' 2\")  1.88 m (6' 2\") 1.88 m (6' 2\")    Weight (lb)  223 240  221 222.4    BMI  28.63 kg/m2 30.81 kg/m2  28.37 kg/m2 28.55 kg/m2    BSA (m2)  2.3 m2 2.39 m2  2.28 m2 2.3 m2         Physical Exam:   Constitutional: Cooperative, in no acute distress, alert, appears stated age.  Skin: Skin color, texture, turgor normal. No rashes or lesions.  Head: Normocephalic. No masses, lesions, tenderness or abnormalities  Eyes: Extraocular movements are grossly intact.  Mouth and throat: Mucous membranes moist  Neck: Neck " supple, no carotid bruits, no JVD  Respiratory: Lungs clear to auscultation, no wheezing or rhonchi, no use of accessory muscles  Chest wall: No scars, normal excursion with respiration  Cardiovascular: Irregular rhythm, + murmur  Gastrointestinal: Abdomen soft, nontender. Bowel sounds normal.  Musculoskeletal: Strength equal in upper extremities  Extremities: 1+ pitting edema in lower extremities bilaterally  Neurologic: Sensation grossly intact, alert and oriented x3      Intake/Output:   No intake/output data recorded.    Outpatient Medications  Current Outpatient Medications on File Prior to Visit   Medication Sig Dispense Refill    acetaminophen (Tylenol) 325 mg tablet 2 tablet EVERY 6 HOURS (route: oral)      atorvastatin (Lipitor) 10 mg tablet Take 1 tablet (10 mg) by mouth once daily. 90 tablet 3    clindamycin (Cleocin T) 1 % lotion 1 Application      dilTIAZem CD (Cardizem CD) 240 mg 24 hr capsule Take 1 capsule (240 mg) by mouth once daily. 90 capsule 3    lisinopril 5 mg tablet Take 1 tablet (5 mg) by mouth once daily. 90 tablet 1    metoprolol succinate XL (Toprol-XL) 25 mg 24 hr tablet Take 1 tablet (25 mg) by mouth once daily. 90 tablet 3    metroNIDAZOLE (Metrocream) 0.75 % cream 0 (Patient not taking: Reported on 11/6/2024)      triamcinolone (Kenalog) 0.025 % cream Apply twice daily to affected areas of legs (avoid face, groin, body folds) for 2 weeks, then taper to twice daily on weekends only; repeat every 6-8 weeks as needed for flares 80 g 2    triamcinolone (Kenalog) 0.1 % cream Apply twice daily to affected areas of legs (avoid face, groin, body folds) for 2 weeks, then taper to twice daily on weekends only; repeat every 6-8 weeks as needed for flares 80 g 1    warfarin (Coumadin) 2.5 mg tablet Take 1 tablet (2.5mg) by mouth on Monday, Wednesday, Friday and 1/2 tablet (1.25mg) by mouth all other days or as directed by Coumadin clinic. 60 tablet 3     No current facility-administered  medications on file prior to visit.     Last Labs:  CBC -  Lab Results   Component Value Date    WBC 6.7 05/06/2024    HGB 14.6 05/06/2024    HCT 42.7 05/06/2024    MCV 94 05/06/2024     05/06/2024       CMP -  Lab Results   Component Value Date    CALCIUM 9.8 04/30/2025    PROT 7.0 04/30/2025    ALBUMIN 5.0 04/30/2025    AST 16 04/30/2025    ALT 25 04/30/2025    ALKPHOS 89 04/30/2025    BILITOT 1.1 04/30/2025       LIPID PANEL -   Lab Results   Component Value Date    CHOL 130 04/30/2025    TRIG 68 04/30/2025    HDL 34 (L) 04/30/2025    CHHDL 3.8 04/30/2025    LDLF 69 04/28/2023    VLDL 16 05/06/2024    NHDL 96 04/30/2025       RENAL FUNCTION PANEL -   Lab Results   Component Value Date    GLUCOSE 80 04/30/2025     04/30/2025    K 4.5 04/30/2025     04/30/2025    CO2 25 04/30/2025    ANIONGAP 8 04/30/2025    BUN 20 04/30/2025    CREATININE 1.06 04/30/2025    GFRMALE 77 04/28/2023    CALCIUM 9.8 04/30/2025    ALBUMIN 5.0 04/30/2025        Lab Results   Component Value Date    BNP 59 04/30/2025       ECG  Encounter Date: 11/06/24   ECG 12 Lead    Narrative    Atrial fibrillation, T wave abnormality, heart rate 52 bpm.       Echocardiogram  No results found for this or any previous visit from the past 1095 days.      CV Studies:  EKG:  Encounter Date: 11/06/24   ECG 12 Lead    Narrative    Atrial fibrillation, T wave abnormality, heart rate 52 bpm.     Echocardiogram:   Echocardiogram     [place] [address]  Phone [phone] Fax [phone]    TRANSTHORACIC ECHOCARDIOGRAM REPORT      Patient Name:     ERIN HOLDEN Reading Physician:   [id number] Pantera Andujar DO  Study Date:       5/9/2023          Referring Physician: PANTERA ANDUJAR  MRN/PID:          [id number]          PCP:  Accession/Order#: [id number]      Department Location: [facility] Echo Lab  YOB: 1955        Fellow:  Gender:           M                 Nurse:                Aleja Worley  Admit Date:                         Sonographer:         Juanita Acevedo  Admission Status: Outpatient        Additional Staff:  Height:           187.96 cm         CC Report to:  Weight:           104.33 kg         Study Type:          Echocardiogram  BSA:              2.31 m2  Blood Pressure: 123 /70 mmHg    Diagnosis/ICD: Q21.20-Atrioventricular septal defect, unspecified as to partial  or complete; I34.0-Nonrheumatic mitral (valve) insufficiency  Indication:    ASD, Mitral regurgitation  Procedure/CPT: Echo Complete w Full [id number]    Patient History:  Pertinent History: ASD repair, MV repair.    Study Detail: The following Echo studies were performed: 2D, M-Mode, Doppler and  color flow. Technically challenging study due to body habitus and  prominent lung artifact. Optison used as a contrast agent for  endocardial border definition and agitated saline used as a  contrast agent for intraseptal flow evaluation.      PHYSICIAN INTERPRETATION:  Left Ventricle: Left ventricular systolic function is normal, with an estimated ejection fraction of 60-65%. There are no regional wall motion abnormalities. The left ventricular cavity size is normal. There is mildly increased left ventricular posterior wall thickness. Left ventricular diastolic filling was indeterminate.  Left Atrium: The left atrium is moderate to severely dilated. A bubble study using agitated saline was performed. Bubble study is negative.  Right Ventricle: The right ventricle is mildly enlarged. There is normal right ventricular global systolic function.  Right Atrium: The right atrium is moderately dilated.  Aortic Valve: The aortic valve is trileaflet. There is no evidence of aortic valve regurgitation. The peak instantaneous gradient of the aortic valve is 12.0 mmHg. The mean gradient of the aortic valve is 7.0 mmHg.  Mitral Valve: The mitral valve is mildly thickened. There is moderate mitral valve  regurgitation.  Tricuspid Valve: The tricuspid valve is structurally normal. There is moderate tricuspid regurgitation. The Doppler estimated RVSP is mild to moderately elevated at 41.4 mmHg.  Pulmonic Valve: The pulmonic valve is structurally normal. There is trace pulmonic valve regurgitation.  Pericardium: There is no pericardial effusion noted.  Aorta: The aortic root is normal.      CONCLUSIONS:  1. Left ventricular systolic function is normal with a 60-65% estimated ejection fraction.  2. The left atrium is moderate to severely dilated.  3. The right atrium is moderately dilated.  4. Moderate mitral valve regurgitation.  5. Mild to moderately elevated right ventricular systolic pressure.  6. Moderate tricuspid regurgitation.    QUANTITATIVE DATA SUMMARY:  2D MEASUREMENTS:  Normal Ranges:  Ao Root d:     3.10 cm   (2.0-3.7cm)  LAs:           6.10 cm   (2.7-4.0cm)  IVSd:          0.80 cm   (0.6-1.1cm)  LVPWd:         1.20 cm   (0.6-1.1cm)  LVIDd:         5.50 cm   (3.9-5.9cm)  LVIDs:         3.90 cm  LV Mass Index: 92.4 g/m2  LV % FS        29.1 %    LA VOLUME:  Normal Ranges:  LA Vol A4C:        118.2 ml   (22+/-6mL/m2)  LA Vol A2C:        108.8 ml  LA Vol BP:         114.4 ml  LA Vol Index A4C:  51.3ml/m2  LA Vol Index A2C:  47.2 ml/m2  LA Vol Index BP:   49.6 ml/m2  LA Area A4C:       32.6 cm2  LA Area A2C:       31.0 cm2  LA Major Axis A4C: 7.6 cm  LA Major Axis A2C: 7.5 cm    RA VOLUME BY A/L METHOD:  Normal Ranges:  RA Area A4C: 29.5 cm2    AORTA MEASUREMENTS:  Normal Ranges:  Ao Sinus, d: 3.10 cm (2.1-3.5cm)  Ao STJ, d:   2.30 cm (1.7-3.4cm)  Asc Ao, d:   3.09 cm (2.1-3.4cm)    LV SYSTOLIC FUNCTION BY 2D PLANIMETRY (MOD):  Normal Ranges:  EF-A4C View: 71.2 % (>=55%)  EF-A2C View: 63.3 %  EF-Biplane:  67.0 %    LV DIASTOLIC FUNCTION:  Normal Ranges:  MV Peak E:    1.37 m/s (0.7-1.2 m/s)  MV e'         0.08 m/s (>8.0)  MV lateral e' 0.07 m/s  MV medial e'  0.10 m/s  E/e' Ratio:   16.12    (<8.0)    MITRAL  VALVE:  Normal Ranges:  MV mean PG: 3.3 mmHg (<48mmHg)  MV DT:      232 msec (150-240msec)    MITRAL INSUFFICIENCY:  Normal Ranges:  MR VTI:       166.50 cm  MR Vmax:      521.00 cm/s  MR Alias Jamari: 38.8 cm/s  MR Volume:    51.90 ml  MR Flow Rt:   162.40 ml/s  MR EROA:      0.31 cm2    AORTIC VALVE:  Normal Ranges:  AoV Vmax:                1.73 m/s  (<=1.7m/s)  AoV Peak P.0 mmHg (<20mmHg)  AoV Mean P.0 mmHg  (1.7-11.5mmHg)  LVOT Max Jamari:            1.02 m/s  (<=1.1m/s)  AoV VTI:                 40.60 cm  (18-25cm)  LVOT VTI:                20.90 cm  LVOT Diameter:           2.00 cm   (1.8-2.4cm)  AoV Area, VTI:           1.62 cm2  (2.5-5.5cm2)  AoV Area,Vmax:           1.85 cm2  (2.5-4.5cm2)  AoV Dimensionless Index: 0.51    RIGHT VENTRICLE:  RV 1   4.24 cm  RV 2   2.99 cm  RV 3   8.38 cm  TAPSE: 27.0 mm  RV s'  0.11 m/s    TRICUSPID VALVE/RVSP:  Normal Ranges:  Peak TR Velocity: 2.89 m/s  RV Syst Pressure: 41.4 mmHg (< 30mmHg)  TV E Vmax:        0.47 m/s  (0.3-0.7m/s)  TV A Vmax:        0.61 m/s  IVC Diam:         2.81 cm      [id number] Pantera Fong DO  Electronically signed on 2023 at 4:53:27 PM         Final     Stress Testing IMESULT(BWR9234:1:1825): No results found for this or any previous visit from the past 1825 days.    Cardiac Catheterization: No results found for this or any previous visit from the past 1825 days.  No results found for this or any previous visit from the past 3650 days.     Cardiac Scoring: No results found for this or any previous visit from the past  days.    AAA : No results found for this or any previous visit from the past  days.    OTHER: No results found for this or any previous visit from the past 1825 days.    LAST IMAGING RESULTS  ECG 12 Lead  Atrial fibrillation, T wave abnormality, heart rate 52 bpm.      Problem List Items Addressed This Visit       Dyslipidemia    Hypertension    Warfarin anticoagulation    History of atrial septal  defect    Mitral valve regurgitation    Permanent atrial fibrillation (Multi) - Primary    (HFpEF) heart failure with preserved ejection fraction          Pantera Fong DO, JORGE, FACOI

## 2025-04-27 DIAGNOSIS — I42.9 CARDIOMYOPATHY, UNSPECIFIED TYPE (MULTI): ICD-10-CM

## 2025-04-27 DIAGNOSIS — I34.0 NONRHEUMATIC MITRAL VALVE REGURGITATION: ICD-10-CM

## 2025-04-27 DIAGNOSIS — I48.21 PERMANENT ATRIAL FIBRILLATION (MULTI): ICD-10-CM

## 2025-04-27 DIAGNOSIS — E78.5 DYSLIPIDEMIA: ICD-10-CM

## 2025-04-27 DIAGNOSIS — Z79.01 WARFARIN ANTICOAGULATION: ICD-10-CM

## 2025-04-27 DIAGNOSIS — I48.20 CHRONIC ATRIAL FIBRILLATION (MULTI): ICD-10-CM

## 2025-04-27 DIAGNOSIS — I10 PRIMARY HYPERTENSION: ICD-10-CM

## 2025-04-27 DIAGNOSIS — Z87.74 HISTORY OF ATRIAL SEPTAL DEFECT: ICD-10-CM

## 2025-04-27 DIAGNOSIS — I51.9 CHRONIC SYSTOLIC DYSFUNCTION OF LEFT VENTRICLE: ICD-10-CM

## 2025-04-28 ENCOUNTER — TELEPHONE (OUTPATIENT)
Dept: CARDIOLOGY | Facility: HOSPITAL | Age: 70
End: 2025-04-28
Payer: COMMERCIAL

## 2025-04-28 DIAGNOSIS — E78.5 DYSLIPIDEMIA: ICD-10-CM

## 2025-04-28 DIAGNOSIS — Z86.79 HISTORY OF CARDIOMYOPATHY: ICD-10-CM

## 2025-04-28 DIAGNOSIS — I50.32 CHRONIC HEART FAILURE WITH PRESERVED EJECTION FRACTION: Primary | ICD-10-CM

## 2025-04-28 DIAGNOSIS — I10 PRIMARY HYPERTENSION: ICD-10-CM

## 2025-04-28 NOTE — TELEPHONE ENCOUNTER
4/28/25 1822  After changing fasting labs to before his appt with Dr. Fong, called and informed patient of lab orders change and to fast for 8 hours.    Patient verbalized understanding of instructions.

## 2025-04-28 NOTE — TELEPHONE ENCOUNTER
PT called to request that we change the expected date for his labs ordered by Dr. Fong (expected date 5/6) as pt seeing Dr. Fong 5/7. Pt states he visited the lab last week attempting to update bloodwork prior to his follow up but they turned him away as orders will not release until 5/6. Routing to Jass ROY for assistance with changing expected date to be sooner than 5/6.

## 2025-05-01 NOTE — TELEPHONE ENCOUNTER
5/1/25  1016  Called lipid panel results to patient and plan for increasing atorvastatin to 20 mg daily.    Patient verbalized understanding of results and declined increase; reports he will discuss starting zetia at his appt with Dr. Fong next week.      ----- Message from Pantera Fong sent at 5/1/2025  8:55 AM EDT -----  Please call the patient regarding his abnormal result.  Lipid panel shows suboptimal control of his cholesterol with an LDL cholesterol of 82.  Please have patient increase atorvastatin to 20 mg   daily.  ----- Message -----  From: Chaim WeMonitor Results In  Sent: 5/1/2025   8:13 AM EDT  To: Pantera Fong, DO

## 2025-05-02 LAB
ALBUMIN SERPL-MCNC: 5 G/DL (ref 3.6–5.1)
ALP SERPL-CCNC: 89 U/L (ref 35–144)
ALT SERPL-CCNC: 25 U/L (ref 9–46)
ANION GAP SERPL CALCULATED.4IONS-SCNC: 8 MMOL/L (CALC) (ref 7–17)
AST SERPL-CCNC: 16 U/L (ref 10–35)
BILIRUB SERPL-MCNC: 1.1 MG/DL (ref 0.2–1.2)
BNP SERPL-MCNC: 59 PG/ML
BUN SERPL-MCNC: 20 MG/DL (ref 7–25)
CALCIUM SERPL-MCNC: 9.8 MG/DL (ref 8.6–10.3)
CHLORIDE SERPL-SCNC: 108 MMOL/L (ref 98–110)
CHOLEST SERPL-MCNC: 130 MG/DL
CHOLEST/HDLC SERPL: 3.8 (CALC)
CO2 SERPL-SCNC: 25 MMOL/L (ref 20–32)
CREAT SERPL-MCNC: 1.06 MG/DL (ref 0.7–1.35)
EGFRCR SERPLBLD CKD-EPI 2021: 76 ML/MIN/1.73M2
GLUCOSE SERPL-MCNC: 80 MG/DL (ref 65–99)
HDLC SERPL-MCNC: 34 MG/DL
LDLC SERPL CALC-MCNC: 82 MG/DL (CALC)
MAGNESIUM SERPL-MCNC: 2.2 MG/DL (ref 1.5–2.5)
NONHDLC SERPL-MCNC: 96 MG/DL (CALC)
POTASSIUM SERPL-SCNC: 4.5 MMOL/L (ref 3.5–5.3)
PROT SERPL-MCNC: 7 G/DL (ref 6.1–8.1)
SODIUM SERPL-SCNC: 141 MMOL/L (ref 135–146)
TRIGL SERPL-MCNC: 68 MG/DL

## 2025-05-05 ENCOUNTER — TELEPHONE (OUTPATIENT)
Dept: CARDIOLOGY | Facility: HOSPITAL | Age: 70
End: 2025-05-05
Payer: COMMERCIAL

## 2025-05-05 NOTE — TELEPHONE ENCOUNTER
PT called stating that he is currently needing a medication refill on : Atorvastin 20mg, [facility].       Routing to the pool to please further assist

## 2025-05-08 RX ORDER — ATORVASTATIN CALCIUM 10 MG/1
10 TABLET, FILM COATED ORAL DAILY
Qty: 90 TABLET | Refills: 3 | Status: SHIPPED | OUTPATIENT
Start: 2025-05-08

## 2025-05-09 ENCOUNTER — OFFICE VISIT (OUTPATIENT)
Dept: CARDIOLOGY | Facility: HOSPITAL | Age: 70
End: 2025-05-09
Payer: COMMERCIAL

## 2025-05-09 VITALS
BODY MASS INDEX: 28.36 KG/M2 | SYSTOLIC BLOOD PRESSURE: 120 MMHG | HEART RATE: 52 BPM | DIASTOLIC BLOOD PRESSURE: 82 MMHG | HEIGHT: 74 IN | WEIGHT: 221 LBS

## 2025-05-09 DIAGNOSIS — I51.9 CHRONIC SYSTOLIC DYSFUNCTION OF LEFT VENTRICLE: ICD-10-CM

## 2025-05-09 DIAGNOSIS — I48.21 PERMANENT ATRIAL FIBRILLATION (MULTI): Primary | ICD-10-CM

## 2025-05-09 DIAGNOSIS — I10 PRIMARY HYPERTENSION: ICD-10-CM

## 2025-05-09 DIAGNOSIS — Z79.01 WARFARIN ANTICOAGULATION: ICD-10-CM

## 2025-05-09 DIAGNOSIS — I48.20 CHRONIC ATRIAL FIBRILLATION (MULTI): ICD-10-CM

## 2025-05-09 DIAGNOSIS — I50.32 CHRONIC HEART FAILURE WITH PRESERVED EJECTION FRACTION: ICD-10-CM

## 2025-05-09 DIAGNOSIS — Z87.74 HISTORY OF ATRIAL SEPTAL DEFECT: ICD-10-CM

## 2025-05-09 DIAGNOSIS — Z86.79 HISTORY OF CARDIOMYOPATHY: ICD-10-CM

## 2025-05-09 DIAGNOSIS — I42.9 CARDIOMYOPATHY, UNSPECIFIED TYPE (MULTI): ICD-10-CM

## 2025-05-09 DIAGNOSIS — I34.0 NONRHEUMATIC MITRAL VALVE REGURGITATION: ICD-10-CM

## 2025-05-09 DIAGNOSIS — E78.5 DYSLIPIDEMIA: ICD-10-CM

## 2025-05-09 LAB
Q ONSET: 209 MS
QRS COUNT: 9 BEATS
QRS DURATION: 108 MS
QT INTERVAL: 436 MS
QTC CALCULATION(BAZETT): 405 MS
QTC FREDERICIA: 415 MS
R AXIS: 158 DEGREES
T AXIS: -4 DEGREES
T OFFSET: 427 MS
VENTRICULAR RATE: 52 BPM

## 2025-05-09 PROCEDURE — 1036F TOBACCO NON-USER: CPT | Performed by: INTERNAL MEDICINE

## 2025-05-09 PROCEDURE — 99214 OFFICE O/P EST MOD 30 MIN: CPT | Mod: 25 | Performed by: INTERNAL MEDICINE

## 2025-05-09 PROCEDURE — 1159F MED LIST DOCD IN RCRD: CPT | Performed by: INTERNAL MEDICINE

## 2025-05-09 PROCEDURE — 3079F DIAST BP 80-89 MM HG: CPT | Performed by: INTERNAL MEDICINE

## 2025-05-09 PROCEDURE — 3008F BODY MASS INDEX DOCD: CPT | Performed by: INTERNAL MEDICINE

## 2025-05-09 PROCEDURE — 3074F SYST BP LT 130 MM HG: CPT | Performed by: INTERNAL MEDICINE

## 2025-05-09 PROCEDURE — 93005 ELECTROCARDIOGRAM TRACING: CPT | Performed by: INTERNAL MEDICINE

## 2025-05-10 LAB
ERYTHROCYTE [DISTWIDTH] IN BLOOD BY AUTOMATED COUNT: 13.3 % (ref 11–15)
HCT VFR BLD AUTO: 42.4 % (ref 38.5–50)
HGB BLD-MCNC: 13.8 G/DL (ref 13.2–17.1)
INR PPP: 2
MCH RBC QN AUTO: 31.7 PG (ref 27–33)
MCHC RBC AUTO-ENTMCNC: 32.5 G/DL (ref 32–36)
MCV RBC AUTO: 97.5 FL (ref 80–100)
PLATELET # BLD AUTO: 197 THOUSAND/UL (ref 140–400)
PMV BLD REES-ECKER: 11 FL (ref 7.5–12.5)
PROTHROMBIN TIME: 20 SEC (ref 9–11.5)
PSA SERPL-MCNC: 6.16 NG/ML
RBC # BLD AUTO: 4.35 MILLION/UL (ref 4.2–5.8)
WBC # BLD AUTO: 6.6 THOUSAND/UL (ref 3.8–10.8)

## 2025-05-12 ENCOUNTER — ANTICOAGULATION - WARFARIN VISIT (OUTPATIENT)
Dept: PHARMACY | Facility: HOSPITAL | Age: 70
End: 2025-05-12
Payer: COMMERCIAL

## 2025-05-12 DIAGNOSIS — I48.91 ATRIAL FIBRILLATION, UNSPECIFIED TYPE (MULTI): Primary | ICD-10-CM

## 2025-05-12 NOTE — PATIENT INSTRUCTIONS
Your INR was in range at 2.0.  Please continue your weekly regimen of 1.25 mg Tues, Thurs, Sat and 2.5mg all other days. Retest again in 4 weeks.   If any questions arise do not hesitate to call us at [phone] M-F from 8:30am-4:30pm or at   [phone] after 5pm or on the weekends. Continue to monitor for any excess bleeding or bruising, especially for blood in your stool.

## 2025-05-12 NOTE — PROGRESS NOTES
We received a lab result from 5/9 with an INR of 2.0. Mr. Lawrence reports no changes in his diet or medications. Also reports no signs or symptoms of bleeding/bruising. No extra/missed doses of warfarin.   Given his INR is in range, we will have him continue his weekly regimen of 1.25mg Tues, Thurs, Sat and 2.5mg all other days. We will follow up after he tests again in 4 weeks.

## 2025-05-16 ENCOUNTER — APPOINTMENT (OUTPATIENT)
Dept: UROLOGY | Facility: CLINIC | Age: 70
End: 2025-05-16
Payer: COMMERCIAL

## 2025-05-16 DIAGNOSIS — N40.0 BPH WITH ELEVATED PSA: ICD-10-CM

## 2025-05-16 DIAGNOSIS — N52.1 ERECTILE DYSFUNCTION DUE TO DISEASES CLASSIFIED ELSEWHERE: ICD-10-CM

## 2025-05-16 DIAGNOSIS — R97.20 BPH WITH ELEVATED PSA: ICD-10-CM

## 2025-05-16 DIAGNOSIS — R97.20 ELEVATED PSA: Primary | ICD-10-CM

## 2025-05-16 RX ORDER — SILDENAFIL 100 MG/1
100 TABLET, FILM COATED ORAL DAILY PRN
Qty: 10 TABLET | Refills: 0 | Status: SHIPPED | OUTPATIENT
Start: 2025-05-16 | End: 2025-06-15

## 2025-05-16 NOTE — PROGRESS NOTES
05/16/2025  Complaining some urgency, ED    Patient has no nausea, no vomiting, no fever.    MOLLY: Deferred    PSA  5/9/2025 6.16  5/6/2024 5.4    We discussed benign prostate hypertrophy, possible Flomax in future  We discussed elevated PSA continue monitor PSA 6 months and 1 year  We discussed erectile dysfunction resume Viagra 100 mg  All the questions were answered, the patient expressed understanding and agreed to the plan.    Impression  ED  BPH  Elevated PSA, negative biopsy x 2     Plan  Viagra 100 mg twice week as needed  Check PSA 6 months and a year  Watch voiding  Appointment in a year      Chief Complaint   Patient presents with    Elevated PSA     Pt is here today for a yearly follow up. He states he is voiding well.         Physical Exam     TODAYS LAB RESULTS:  Lab Results   Component Value Date    PSA 6.16 (H) 05/09/2025    PSA 5.40 (H) 05/06/2024    PSA 4.93 (H) 11/11/2023         ASSESSMENT&PLAN:      IMPRESSIONS:         05/16/2024  Voiding well, no medications     Patient has no nausea, no vomiting, no fever.     MOLLY: Deferred     PSA: Elevated but stable     We discussed benign prostate hypertrophy with mild voiding symptom  We discussed elevated PSA, stable PSA  All the questions were answered, the patient expressed understanding and agreed to the plan.     Impression  BPH  Elevated PSA, negative biopsy x 2     Plan  Yearly MOLLY and PSA  Watch voiding        Patient of Dr. Blancas.        Chief Complaint   Patient presents with    Elevated PSA       Patient here for 6 month follow-up. Negative prostate biopsy in 2019; he has had 2 negative biopsies. Denies family hx of prostate cancer. Weight is stable.     Benign Prostatic Hypertrophy       Denies nocturia. Voiding well.          Physical Exam      TODAYS LAB RESULTS:     PSA 05/06/2024  5.40     POC Glucose, Urine  NEGATIVE mg/dl NEGATIVE   POC Bilirubin, Urine  NEGATIVE NEGATIVE   POC Ketones, Urine  NEGATIVE mg/dl NEGATIVE   POC Specific  Gravity, Urine  1.005 - 1.035 1.025   POC Blood, Urine  NEGATIVE NEGATIVE   POC PH, Urine  No Reference Range Established PH 5.5   POC Protein, Urine  NEGATIVE, 30 (1+) mg/dl NEGATIVE   POC Urobilinogen, Urine  0.2, 1.0 EU/DL 0.2   Poc Nitrite, Urine  NEGATIVE NEGATIVE   POC Leukocytes, Urine  NEGATIVE NEGATIVE      ASSESSMENT&PLAN:        IMPRESSIONS:        HPI:   Elevated PSA  [facility] 5/2023 26% chance csPCA  No new  symptoms   No MRI contraindications                Lab Results   Component Value Date     PSA 4.93 (H) 11/11/2023     PSA 5.26 (H) 04/28/2023     PSA 4.75 (H) 06/03/2022      ED -      Last visit:  [facility] for further risk stratification  Defers MRI for now  Will call with urine biomarker results - if high risk would consider MRI  FUV in 6 months with PSA prior      5/24/23  1. Elevated PSA   chronic problem that is worsening   6/3/22 PSA was 4.75.  8/2021 PSA 4.19  3/2021 PSA 3.88  Negative biopsy 2019  No family history   No chemical exposures   No MRI contraindications   Father passed at 55, mother passed at 91 3 years ago      2. ED  No new cardiac symptoms  has not tried sildenafil due to lack of partner   On coumadin for A-fib      9/1/22  1. Elevated PSA  6/3/22 PSA was 4.75.  8/2021 PSA 4.19  3/2021 PSA 3.88  Negative biopsy 2019  No family history   No chemical exposures   No MRI contraindications      2. ED  Can sometimes penetrate, more difficult   No MI history  can complete 4 METs  On coumadin for A-fib            Medical History           Past Medical History:   Diagnosis Date    ASD (atrial septal defect)      Cardiomyopathy (CMS/HCC)      Chronic atrial fibrillation with RVR (CMS/HCC)      Dyslipidemia      Fatigue      Hypertension      Mitral valve regurgitation      Other specified health status       No pertinent past medical history    Personal history of other diseases of the circulatory system       History of congestive heart failure    Personal history of other  diseases of the circulatory system       History of atrial fibrillation    Personal history of other diseases of the circulatory system       History of hypertension    Personal history of other endocrine, nutritional and metabolic disease       History of hypercholesterolemia    Warfarin anticoagulation           Surgical History             Past Surgical History:   Procedure Laterality Date    APPENDECTOMY   10/12/2015     Appendectomy    CARDIAC SURGERY   10/12/2015     Heart Surgery    OTHER SURGICAL HISTORY   10/12/2015     Repair Of Congenital Atrial Septal Defect    OTHER SURGICAL HISTORY   10/12/2015     Mitral Valve Paravalvular Leak Repair    OTHER SURGICAL HISTORY   10/12/2015     Hernia Repair Inguinal Unilateral    OTHER SURGICAL HISTORY   12/30/2020     Lumbar laminectomy    OTHER SURGICAL HISTORY   06/20/2019     Prostate needle biopsy         Social History                   Socioeconomic History    Marital status: Single       Spouse name: Not on file    Number of children: Not on file    Years of education: Not on file    Highest education level: Not on file   Occupational History    Not on file   Tobacco Use    Smoking status: Never    Smokeless tobacco: Never   Substance and Sexual Activity    Alcohol use: Not Currently    Drug use: Never    Sexual activity: Not on file   Other Topics Concern    Not on file   Social History Narrative    Not on file      Social Determinants of Health      Financial Resource Strain: Not on file   Food Insecurity: Not on file   Transportation Needs: Not on file   Physical Activity: Not on file   Stress: Not on file   Social Connections: Not on file   Intimate Partner Violence: Not on file   Housing Stability: Not on file                 Current Outpatient Medications   Medication Instructions    acetaminophen (Tylenol) 325 mg tablet 2 tablet EVERY 6 HOURS (route: oral)    atorvastatin (Lipitor) 10 mg tablet 1 tablet, oral, Daily    clindamycin (Cleocin T) 1 % lotion  1 Application    cyclobenzaprine (Flexeril) 10 mg tablet 1 tablet 3 TIMES DAILY (route: oral)    dilTIAZem CD (CARDIZEM CD) 240 mg, oral, Daily    Jantoven 2.5 mg tablet take 1/2 to 1 tablet by mouth once daily as directed    lisinopril 5 mg, oral, Daily    metoprolol succinate XL (TOPROL-XL) 25 mg, oral, Daily    metroNIDAZOLE (Metrocream) 0.75 % cream 0              Allergies   Allergen Reactions    Halothane Unknown            Physical Exam:  General: Alert, cooperative, no acute distress  Eyes: Sclera clear  Cardiac: Extremities are warm and well perfused  Lungs: Breathing non-labored. Speaking in clear and complete sentences.  MSK: Ambulatory with steady gait   Neuro: Alert and oriented to person, place, and time  Psych: Normal mood and affect  Skin: No obvious lesions or rashes     Assessment and Plan:     1. Elevated PSA  Discussed risks and benefits of prostate MRI, defers for now  PSA stable  Defers biopsy  FUV 6 months PSA and MOLLY prior        PSA  5/9/2025 6.16  5/6/2024 5.4  11/11/2023 4.93  4/28/23 5.26  6/3/22 PSA was 4.75.  8/2021 PSA 4.19  3/2021 PSA 3.88        Prostate biopsy  2020 negative?  2019 negative?

## 2025-05-16 NOTE — PATIENT INSTRUCTIONS
Impression  ED  BPH  Elevated PSA, negative biopsy x 2     Plan  Viagra 100 mg twice week as needed  Check PSA 6 months and a year  Watch voiding  Appointment in a year

## 2025-05-28 DIAGNOSIS — Z86.79 HISTORY OF CARDIOMYOPATHY: ICD-10-CM

## 2025-05-28 DIAGNOSIS — I10 PRIMARY HYPERTENSION: ICD-10-CM

## 2025-06-12 ENCOUNTER — APPOINTMENT (OUTPATIENT)
Dept: DERMATOLOGY | Facility: CLINIC | Age: 70
End: 2025-06-12
Payer: COMMERCIAL

## 2025-06-12 DIAGNOSIS — Z85.820 PERSONAL HISTORY OF MALIGNANT MELANOMA OF SKIN: ICD-10-CM

## 2025-06-12 DIAGNOSIS — L71.9 ROSACEA: ICD-10-CM

## 2025-06-12 DIAGNOSIS — L73.9 FOLLICULITIS: ICD-10-CM

## 2025-06-12 DIAGNOSIS — L57.8 DIFFUSE PHOTODAMAGE OF SKIN: ICD-10-CM

## 2025-06-12 DIAGNOSIS — L81.4 LENTIGO: ICD-10-CM

## 2025-06-12 DIAGNOSIS — D48.5 NEOPLASM OF UNCERTAIN BEHAVIOR OF SKIN: Primary | ICD-10-CM

## 2025-06-12 DIAGNOSIS — L82.1 SEBORRHEIC KERATOSIS: ICD-10-CM

## 2025-06-12 DIAGNOSIS — D22.5 MELANOCYTIC NEVUS OF TRUNK: ICD-10-CM

## 2025-06-12 DIAGNOSIS — L57.0 ACTINIC KERATOSIS: ICD-10-CM

## 2025-06-12 PROCEDURE — 11311 SHAVE SKIN LESION 0.6-1.0 CM: CPT | Performed by: DERMATOLOGY

## 2025-06-12 PROCEDURE — 17004 DESTROY PREMAL LESIONS 15/>: CPT | Performed by: DERMATOLOGY

## 2025-06-12 PROCEDURE — 99214 OFFICE O/P EST MOD 30 MIN: CPT | Performed by: DERMATOLOGY

## 2025-06-12 PROCEDURE — 11302 SHAVE SKIN LESION 1.1-2.0 CM: CPT | Performed by: DERMATOLOGY

## 2025-06-12 PROCEDURE — 1159F MED LIST DOCD IN RCRD: CPT | Performed by: DERMATOLOGY

## 2025-06-12 PROCEDURE — 11102 TANGNTL BX SKIN SINGLE LES: CPT | Performed by: DERMATOLOGY

## 2025-06-12 ASSESSMENT — DERMATOLOGY PATIENT ASSESSMENT
HAVE YOU HAD OR DO YOU HAVE VASCULAR DISEASE: NO
HAVE YOU HAD OR DO YOU HAVE A STAPH INFECTION: NO
DO YOU USE A TANNING BED: NO
DO YOU USE SUNSCREEN: OCCASIONALLY
ARE YOU AN ORGAN TRANSPLANT RECIPIENT: NO
WHERE ARE THESE NEW OR CHANGING LESIONS LOCATED: RIGHT CHEEK
DO YOU HAVE ANY NEW OR CHANGING LESIONS: YES

## 2025-06-12 ASSESSMENT — PATIENT GLOBAL ASSESSMENT (PGA): PATIENT GLOBAL ASSESSMENT: PATIENT GLOBAL ASSESSMENT:  1 - CLEAR

## 2025-06-12 ASSESSMENT — DERMATOLOGY QUALITY OF LIFE (QOL) ASSESSMENT
DATE THE QUALITY-OF-LIFE ASSESSMENT WAS COMPLETED: 67368
ARE THERE EXCLUSIONS OR EXCEPTIONS FOR THE QUALITY OF LIFE ASSESSMENT: NO
RATE HOW EMOTIONALLY BOTHERED YOU ARE BY YOUR SKIN PROBLEM (FOR EXAMPLE, WORRY, EMBARRASSMENT, FRUSTRATION): 0 - NEVER BOTHERED
RATE HOW BOTHERED YOU ARE BY SYMPTOMS OF YOUR SKIN PROBLEM (EG, ITCHING, STINGING BURNING, HURTING OR SKIN IRRITATION): 0 - NEVER BOTHERED
RATE HOW BOTHERED YOU ARE BY EFFECTS OF YOUR SKIN PROBLEMS ON YOUR ACTIVITIES (EG, GOING OUT, ACCOMPLISHING WHAT YOU WANT, WORK ACTIVITIES OR YOUR RELATIONSHIPS WITH OTHERS): 0 - NEVER BOTHERED
WHAT SINGLE SKIN CONDITION LISTED BELOW IS THE PATIENT ANSWERING THE QUALITY-OF-LIFE ASSESSMENT QUESTIONS ABOUT: NONE OF THE ABOVE

## 2025-06-12 ASSESSMENT — ITCH NUMERIC RATING SCALE: HOW SEVERE IS YOUR ITCHING?: 0

## 2025-06-12 NOTE — Clinical Note
Actinic Keratoses -scattered on face and scalp.  The pre-cancerous nature of these lesions and treatment options were discussed with the patient today.  At this time, I recommend treatment with liquid nitrogen cryotherapy.  The patient expressed understanding, is in agreement with this plan, and wishes to proceed with cryotherapy today.   MEDICINE HOSPITALIST

## 2025-06-12 NOTE — Clinical Note
Scattered on the patient's face and scalp, there are multiple erythematous, gritty, scaly macules

## 2025-06-12 NOTE — Clinical Note
History of melanoma, lentigo maligna type, nonmelanoma skin cancers, dysplastic nevi, and actinic keratoses and photodamage.  There is no evidence of local, regional, or distant recurrent disease or any new primary melanomas on exam today.  I emphasized the importance of continuing to perform monthly self-skin and lymph node exams using the ABCDs of monitoring moles, which were reviewed with the patient, as well as the importance of sun avoidance and sun protection with daily sunscreen use.  I will have the patient return to our office in 4-6 months, pending the above biopsy results, for routine melanoma follow-up and total body skin exam, and the patient was instructed to call our office should the patient notice any new, changing, symptomatic, or otherwise concerning skin lesions before then.  The patient expressed understanding and is in agreement with this plan.

## 2025-06-12 NOTE — Clinical Note
Rosacea -flare on face; papulo-pustular type.  The chronic and intermittently flaring nature of this condition and treatment options were discussed extensively with the patient today.  At this time, I recommend topical therapy with Clindamycin 1% lotion, which the patient was instructed to apply twice daily to the affected areas of the face.  I also discussed the various triggers of rosacea with the patient today, especially sun exposure, and emphasized the importance of trigger avoidance, particularly sun avoidance and sun protection with daily sunscreen use.  The risks, benefits, and side effects of this medication were discussed.  The patient expressed understanding and is in agreement with this plan.

## 2025-06-12 NOTE — Clinical Note
Biopsy-proven compound dysplastic nevus with mild atypia - left upper back, extending to the peripheral margins.  The atypical nature of this dysplastic nevus, its extension to the margin, and management options were discussed extensively with the patient in the office today.  Given the dysplastic nature of this nevus and its extension to the margin, I recommend re-excision of this lesion to ensure complete removal.  After discussing the risks and benefits of various options for excision, including horizontal removal via shave technique versus full-thickness surgical re-excision, the patient expressed understanding and wishes to undergo horizontal removal of this lesion via shave technique today.

## 2025-06-12 NOTE — Clinical Note
Scattered on the patient's neck, back, and bilateral shoulders, there are several follicular-based erythematous, inflammatory papules and pustules

## 2025-06-12 NOTE — Clinical Note
Folliculitis - flare on neck, back, and bilateral shoulders.  The bacterial nature of this condition and treatment options were discussed with the patient today.  At this time, I recommend topical antibiotic therapy with Clindamycin 1% lotion, which the patient was instructed to apply twice daily to the affected areas or up to 3-4 times per day as needed for active lesions.  The risks, benefits, and side effects of this medication were discussed.  The patient expressed understanding and is in agreement with this plan.

## 2025-06-13 NOTE — PROGRESS NOTES
"Subjective     Raman Lawrence is a 69 y.o. male who presents for the following: Skin Check.  He notes a new red bump on his right cheek, which has been present for a few months, has increased in size, and bleeds when he shaves over it, and it does not heal.  He also notes recent pimple breakouts on his face and neck.  He denies any other new, changing, or concerning skin lesions since his last visit; no bleeding, itching, or burning lesions.      Review of Systems:  No other skin or systemic complaints other than what is documented elsewhere in the note.    The following portions of the chart were reviewed this encounter and updated as appropriate:       Skin Cancer History  Biopsy Log Book  Biopsied Type Location Status   4/17/24 BCC, Superficial Right Anterior Distal Leg Schedule Procedure  2/25/25 4/17/24 BCC Left Lateral Posterior Neck Refer Mohs/Surgeon  2/25/25   8/15/24 BCC Left Lateral Mid-Forearm Treatment Complete  2/25/25 12/12/24 BCC Left Posterior Proximal Shoulder Treatment Complete  2/25/25       Additional History      Specialty Problems    None      Past Dermatologic / Past Relevant Medical History:    - history of melanoma, lentigo maligna type with Breslow depth 0.5 mm, on right lateral forehead diagnosed by Dr. Inocencia Snyder on 8/10/21 s/p staged wide local excision with peripheral margin control (\"slow Mohs surgery\") by Dr. Fagan on 9/14/21    - history of BCC on left posterior proximal shoulder diagnosed on 12/12/24 s/p Mohs surgery by Dr. Gonzalez on 2/25/25  - BCC on left lateral mid forearm diagnosed on 8/15/24 s/p ED&C on 11/4/24  - 2 BCCs on right anterior distal leg and left lateral posterior neck both diagnosed on 4/17/24 s/p ED&C on 5/2/24 and Mohs surgery by Dr. Gonzalez on 5/15/24, respectively  - SCC in situ right lateral distal forearm diagnosed on 8/14/23 s/p wide local excision with 5-mm margins by Dr. Mujica on 6/28/24  - BCC on right lateral upper cheek diagnosed on " 12/1/22 s/p Mohs surgery by Dr. Gonzalez on 3/23/23  - BCC on left proximal forearm diagnosed on 4/7/22 s/p wide local excision with 5-mm margins by Dr. Gonzalez on 7/13/22  - SCC in situ on right dorsal distal forearm diagnosed on 12/9/21 s/p wide local excision with 5-mm margins by Dr. Gonzalez on 4/13/22  - BCC on right dorsal mid-forearm diagnosed on 12/9/21 s/p Mohs surgery by Dr. Gonzalez on 3/10/22  - BCC on left temple diagnosed by Dr. Fagan on 9/14/21 s/p Mohs surgery by Dr. Fagan on 9/16/21    - AKs    - junctional dysplastic nevus with mild atypia on right upper back on 12/9/21  - dysplastic junctional nevi in actinically damaged skin on right lateral distal arm and left medial mid forearm both diagnosed on 4/7/22 and s/p re-excision with 3-mm margins by Dr. Portillo on 9/26/22 and re-excision with 5-mm margins by Dr. Gallegos on 10/3/22, respectively  - mildly dysplastic compound nevus on left lateral mid arm diagnosed on 8/4/22  - moderately dysplastic junctional nevus with an area of more significant architectural disorder on right medial dorsal mid forearm diagnosed on 12/1/22 s/p re-excision with 3-mm margins by Dr. Gallegos on 4/3/23  - combined melanocytic nevus with features of dysplastic nevus (mild atypia) on left medial superior upper back and compound dysplastic nevus with mild atypia on left medial upper back both diagnosed on 4/6/23  - mildly dysplastic junctional nevus on right lateral inferior posterior neck diagnosed on 8/14/23  - inflamed mildly dysplastic compound nevus on right upper back diagnosed on 12/18/23  - 2 mildly dysplastic compound nevi on right lateral proximal arm and right posterior distal arm both diagnosed on 4/17/24  - compound dysplastic nevus with mild atypia on left upper back diagnosed on 12/12/24 s/p re-shave on 6/12/25    - rosacea  - folliculitis  - eczema    - reported history of BCC on left temple treated over 20 years ago with recurrence as detailed above  - reported  history of multiple dysplastic nevi diagnosed by his previous outside Dermatologist, Dr. Inocencia Snyder    Family History:    No family history of melanoma or skin cancer    Social History:    The patient states he lives in [place], [place], and works as a  for [employer] at the intersection of [email or web address], and one of his co-workers is a patient in our office as well; he states he will be retiring in 2 weeks    Allergies:  Halothane    Current Medications / CAM's:  Current Medications[1]     Objective   Well appearing patient in no apparent distress; mood and affect are within normal limits.    A full examination was performed including scalp, face, eyes, ears, nose, lips, neck, chest, axillae, abdomen, back, bilateral upper extremities, and bilateral lower extremities. All findings within normal limits unless otherwise noted below.    Assessment/Plan   Skin Exam  1. NEOPLASM OF UNCERTAIN BEHAVIOR OF SKIN (3)  Left Upper Back  Pink, well-healed scar at his recent biopsy site    Shave removal    Lesion length (cm):  1.2  Margin per side (cm):  0.2  Lesion diameter (cm):  1.6  Informed consent: discussed and consent obtained    Timeout: patient name, date of birth, surgical site, and procedure verified    Procedure prep:  Patient was prepped and draped  Anesthesia: the lesion was anesthetized in a standard fashion    Anesthetic:  1% lidocaine w/ epinephrine 1-100,000 local infiltration  Instrument used: flexible razor blade    Hemostasis achieved with: aluminum chloride    Outcome: patient tolerated procedure well    Post-procedure details: sterile dressing applied and wound care instructions given    Dressing type: bandage and petrolatum      Staff Communication: Dermatology Local Anesthesia: 1 % Lidocaine / Epinephrine - Amount:0.5ml  Specimen 1 - Dermatopathology- DERM LAB  Differential Diagnosis: DN; re-shave  Check Margins Yes/No?:    Comments:    Dermpath Lab: Routine  Histopathology (formalin-fixed tissue)  Right Lateral Cheek  7 mm pink, shiny papule     Shave removal    Lesion length (cm):  0.7  Margin per side (cm):  0  Lesion diameter (cm):  0.7  Informed consent: discussed and consent obtained    Timeout: patient name, date of birth, surgical site, and procedure verified    Procedure prep:  Patient was prepped and draped  Anesthesia: the lesion was anesthetized in a standard fashion    Anesthetic:  1% lidocaine w/ epinephrine 1-100,000 local infiltration  Instrument used: flexible razor blade    Hemostasis achieved with: aluminum chloride    Outcome: patient tolerated procedure well    Post-procedure details: sterile dressing applied and wound care instructions given    Dressing type: bandage and petrolatum      Staff Communication: Dermatology Local Anesthesia: 1 % Lidocaine / Epinephrine - Amount:0.5ml  Specimen 2 - Dermatopathology- DERM LAB  Differential Diagnosis: BCC  Check Margins Yes/No?:    Comments:    Dermpath Lab: Routine Histopathology (formalin-fixed tissue)  Left Distal Dorsal Forearm  2.2 cm pink and white patch     Lesion biopsy  Type of biopsy: tangential    Informed consent: discussed and consent obtained    Timeout: patient name, date of birth, surgical site, and procedure verified    Procedure prep:  Patient was prepped and draped  Anesthesia: the lesion was anesthetized in a standard fashion    Anesthetic:  1% lidocaine w/ epinephrine 1-100,000 local infiltration  Instrument used: flexible razor blade    Hemostasis achieved with: aluminum chloride    Outcome: patient tolerated procedure well    Post-procedure details: wound care instructions given      Staff Communication: Dermatology Local Anesthesia: 1 % Lidocaine / Epinephrine - Amount:0.5ml  Specimen 3 - Dermatopathology- DERM LAB  Differential Diagnosis: BCC v scar v AK  Check Margins Yes/No?:    Comments:    Dermpath Lab: Routine Histopathology (formalin-fixed tissue)  Biopsy-proven compound  dysplastic nevus with mild atypia - left upper back, extending to the peripheral margins.  The atypical nature of this dysplastic nevus, its extension to the margin, and management options were discussed extensively with the patient in the office today.  Given the dysplastic nature of this nevus and its extension to the margin, I recommend re-excision of this lesion to ensure complete removal.  After discussing the risks and benefits of various options for excision, including horizontal removal via shave technique versus full-thickness surgical re-excision, the patient expressed understanding and wishes to undergo horizontal removal of this lesion via shave technique today.  2. ACTINIC KERATOSIS (17)  Head - Anterior (Face) (17)  Scattered on the patient's face and scalp, there are multiple erythematous, gritty, scaly macules   Actinic Keratoses -scattered on face and scalp.  The pre-cancerous nature of these lesions and treatment options were discussed with the patient today.  At this time, I recommend treatment with liquid nitrogen cryotherapy.  The patient expressed understanding, is in agreement with this plan, and wishes to proceed with cryotherapy today.  Destr of lesion - Head - Anterior (Face) (17)  Complexity: simple    Destruction method: cryotherapy    Informed consent: discussed and consent obtained    Lesion destroyed using liquid nitrogen: Yes    Cryotherapy cycles:  1  Outcome: patient tolerated procedure well with no complications    Post-procedure details: wound care instructions given    3. MELANOCYTIC NEVUS OF TRUNK  Generalized  Scattered on the patient's face, neck, trunk, and extremities, there are several small, round- to oval-shaped, brown-pigmented and pink-colored, symmetric, uniform-appearing macules and dome-shaped papules  Clinically benign- to slightly atypical-appearing nevi - the clinically benign- to slightly atypical-appearing nature of the patient's nevi was discussed with the  patient today.  None of the patient's nevi meet threshold for biopsy today.  I emphasized the importance of performing monthly self-skin exams using the ABCDs of monitoring moles, which were reviewed with the patient today and an informational hand-out provided.  I also emphasized the importance of sun avoidance and sun protection with daily sunscreen use.  The patient expressed understanding and is in agreement with this plan.  4. LENTIGO  Photodistributed  Multiple tan- to light brown-colored, round- to oval-shaped, symmetric and uniform-appearing macules and small patches consistent with lentigines scattered in sun-exposed areas.  Solar Lentigines and photodamage.  The clinically benign-appearing nature of these lesions and their relation to chronic sun exposure were discussed with the patient today and reassurance provided.  None of these lesions meet threshold for biopsy today, and thus no treatment is medically indicated for these lesions at this time.  The signs and symptoms of skin cancer were reviewed and the patient was advised to practice sun protection and sun avoidance, use daily sunscreen, and perform regular self skin exams.  The patient was instructed to monitor these lesions for any changes, such as in size, shape, or color, or associated symptoms and to call our office to schedule a return visit for re-evaluation if any such changes or symptoms are noticed in the future.  The patient expressed understanding and is in agreement with this plan.  5. SEBORRHEIC KERATOSIS  Generalized  Scattered on the patient's face, neck, trunk, and extremities, there are multiple tan- to light brown-colored, hyperkeratotic, stuck-on appearing papules of varying size and shape  Seborrheic Keratoses - the benign nature of these lesions was discussed with the patient today and reassurance provided.  No treatment is medically indicated for these lesions at this time.  6. FOLLICULITIS  Neck - Anterior  Scattered on the  patient's neck, back, and bilateral shoulders, there are several follicular-based erythematous, inflammatory papules and pustules  Folliculitis - flare on neck, back, and bilateral shoulders.  The bacterial nature of this condition and treatment options were discussed with the patient today.  At this time, I recommend topical antibiotic therapy with Clindamycin 1% lotion, which the patient was instructed to apply twice daily to the affected areas or up to 3-4 times per day as needed for active lesions.  The risks, benefits, and side effects of this medication were discussed.  The patient expressed understanding and is in agreement with this plan.  7. ROSACEA  Head - Anterior (Face)  On the patient's face, most prominent over the bilateral medial cheeks and nose, there is moderate underlying erythema with several overlying telangiectasias and a few scattered erythematous, inflammatory papules   Rosacea -flare on face; papulo-pustular type.  The chronic and intermittently flaring nature of this condition and treatment options were discussed extensively with the patient today.  At this time, I recommend topical therapy with Clindamycin 1% lotion, which the patient was instructed to apply twice daily to the affected areas of the face.  I also discussed the various triggers of rosacea with the patient today, especially sun exposure, and emphasized the importance of trigger avoidance, particularly sun avoidance and sun protection with daily sunscreen use.  The risks, benefits, and side effects of this medication were discussed.  The patient expressed understanding and is in agreement with this plan.  8. PERSONAL HISTORY OF MALIGNANT MELANOMA OF SKIN  Generalized  The sites of prior melanoma excision were examined.  There is no evidence of recurrent growth or pigmentation or recurrent disease, satellite papules, or nodules on exam today.  History of melanoma, lentigo maligna type, nonmelanoma skin cancers, dysplastic nevi,  and actinic keratoses and photodamage.  There is no evidence of local, regional, or distant recurrent disease or any new primary melanomas on exam today.  I emphasized the importance of continuing to perform monthly self-skin and lymph node exams using the ABCDs of monitoring moles, which were reviewed with the patient, as well as the importance of sun avoidance and sun protection with daily sunscreen use.  I will have the patient return to our office in 4-6 months, pending the above biopsy results, for routine melanoma follow-up and total body skin exam, and the patient was instructed to call our office should the patient notice any new, changing, symptomatic, or otherwise concerning skin lesions before then.  The patient expressed understanding and is in agreement with this plan.  9. DIFFUSE PHOTODAMAGE OF SKIN  Photodistributed  Diffuse photodamage with actinic changes with telangiectasia and mottled pigmentation in sun-exposed areas.  Photodamage.  The signs and symptoms of skin cancer were reviewed and the patient was advised to practice sun protection and sun avoidance, use daily sunscreen, and perform regular self skin exams.  Sun protection was discussed, including avoiding the mid-day sun, wearing a sunscreen with SPF at least 50, and stressing the need for reapplication of sunscreen and applying more than they think they need.          [1]   Current Outpatient Medications:     acetaminophen (Tylenol) 325 mg tablet, 2 tablet EVERY 6 HOURS (route: oral), Disp: , Rfl:     atorvastatin (Lipitor) 10 mg tablet, TAKE ONE TABLET BY MOUTH EVERY DAY, Disp: 90 tablet, Rfl: 3    clindamycin (Cleocin T) 1 % lotion, 1 Application, Disp: , Rfl:     dilTIAZem CD (Cardizem CD) 240 mg 24 hr capsule, Take 1 capsule (240 mg) by mouth once daily., Disp: 90 capsule, Rfl: 3    lisinopril 5 mg tablet, Take 1 tablet (5 mg) by mouth once daily., Disp: 90 tablet, Rfl: 1    metoprolol succinate XL (Toprol-XL) 25 mg 24 hr tablet, Take  1 tablet (25 mg) by mouth once daily., Disp: 90 tablet, Rfl: 3    metroNIDAZOLE (Metrocream) 0.75 % cream, , Disp: , Rfl:     sildenafil (Viagra) 100 mg tablet, Take 1 tablet (100 mg) by mouth once daily as needed for erectile dysfunction., Disp: 10 tablet, Rfl: 0    triamcinolone (Kenalog) 0.025 % cream, Apply twice daily to affected areas of legs (avoid face, groin, body folds) for 2 weeks, then taper to twice daily on weekends only; repeat every 6-8 weeks as needed for flares, Disp: 80 g, Rfl: 2    triamcinolone (Kenalog) 0.1 % cream, Apply twice daily to affected areas of legs (avoid face, groin, body folds) for 2 weeks, then taper to twice daily on weekends only; repeat every 6-8 weeks as needed for flares, Disp: 80 g, Rfl: 1    warfarin (Coumadin) 2.5 mg tablet, Take 1 tablet (2.5mg) by mouth on Monday, Wednesday, Friday and 1/2 tablet (1.25mg) by mouth all other days or as directed by Coumadin clinic., Disp: 60 tablet, Rfl: 3     (4) rarely moist

## 2025-06-21 LAB
INR PPP: 2
PROTHROMBIN TIME: 20.6 SEC (ref 9–11.5)

## 2025-06-23 ENCOUNTER — ANTICOAGULATION - WARFARIN VISIT (OUTPATIENT)
Dept: PHARMACY | Facility: HOSPITAL | Age: 70
End: 2025-06-23
Payer: COMMERCIAL

## 2025-06-23 DIAGNOSIS — I48.91 ATRIAL FIBRILLATION, UNSPECIFIED TYPE (MULTI): Primary | ICD-10-CM

## 2025-06-23 NOTE — PATIENT INSTRUCTIONS
Your INR was in range at 2.0.  Please continue your weekly regimen of 1.25 mg Tues, Thurs, Sat and 2.5mg all other days. Retest again in 4 weeks.   If any questions arise do not hesitate to call us at [phone] M-F from 8:30am-4:30pm or at   [phone] after 5pm or on the weekends. Continue to monitor for any excess bleeding or bruising, especially for blood in your stool.

## 2025-06-23 NOTE — PROGRESS NOTES
We received a lab result from 6/20 with an INR of 2.0. Mr. Lawrence reports no changes in his diet or medications. Also reports no signs or symptoms of bleeding/bruising. No extra/missed doses of warfarin.   Given his INR is in range, we will have him continue his weekly regimen of 1.25mg Tues, Thurs, Sat and 2.5mg all other days. We will follow up after he tests again in 4 weeks.

## 2025-06-26 ENCOUNTER — TELEPHONE (OUTPATIENT)
Dept: CARDIOLOGY | Facility: HOSPITAL | Age: 70
End: 2025-06-26
Payer: COMMERCIAL

## 2025-06-26 NOTE — TELEPHONE ENCOUNTER
Called pharmacy to confirm patient had refills on file. Called patient and let him know he had refills available and he can call the pharmacy at this time to fill those.

## 2025-06-26 NOTE — TELEPHONE ENCOUNTER
----- Message from Nurse Noemy CHRISTIANSON sent at 6/26/2025  1:45 PM EDT -----  Regarding: Refil  Refill atovastatin 20 mg to [facility] to reflect his increased dose. Patient phone [phone]

## 2025-06-30 DIAGNOSIS — Z79.01 WARFARIN ANTICOAGULATION: ICD-10-CM

## 2025-06-30 DIAGNOSIS — Z87.74 HISTORY OF ATRIAL SEPTAL DEFECT: ICD-10-CM

## 2025-06-30 DIAGNOSIS — I10 PRIMARY HYPERTENSION: ICD-10-CM

## 2025-06-30 DIAGNOSIS — I48.20 CHRONIC ATRIAL FIBRILLATION (MULTI): ICD-10-CM

## 2025-06-30 DIAGNOSIS — I42.9 CARDIOMYOPATHY, UNSPECIFIED TYPE (MULTI): ICD-10-CM

## 2025-06-30 DIAGNOSIS — I51.9 CHRONIC SYSTOLIC DYSFUNCTION OF LEFT VENTRICLE: ICD-10-CM

## 2025-06-30 DIAGNOSIS — I48.21 PERMANENT ATRIAL FIBRILLATION (MULTI): ICD-10-CM

## 2025-06-30 DIAGNOSIS — I34.0 NONRHEUMATIC MITRAL VALVE REGURGITATION: ICD-10-CM

## 2025-06-30 DIAGNOSIS — E78.5 DYSLIPIDEMIA: ICD-10-CM

## 2025-07-01 RX ORDER — ATORVASTATIN CALCIUM 20 MG/1
20 TABLET, FILM COATED ORAL DAILY
Qty: 90 TABLET | Refills: 3 | Status: SHIPPED | OUTPATIENT
Start: 2025-07-01 | End: 2026-07-01

## 2025-07-22 DIAGNOSIS — I48.91 ATRIAL FIBRILLATION, UNSPECIFIED TYPE (MULTI): ICD-10-CM

## 2025-07-22 DIAGNOSIS — I10 PRIMARY HYPERTENSION: ICD-10-CM

## 2025-07-22 RX ORDER — LISINOPRIL 5 MG/1
5 TABLET ORAL DAILY
Qty: 90 TABLET | Refills: 1 | Status: SHIPPED | OUTPATIENT
Start: 2025-07-22

## 2025-07-22 RX ORDER — WARFARIN 2.5 MG/1
TABLET ORAL
Qty: 100 TABLET | Refills: 0 | Status: SHIPPED | OUTPATIENT
Start: 2025-07-22

## 2025-07-22 NOTE — TELEPHONE ENCOUNTER
Called to confirm he has been able to get atorvastatin.  He has.  He mentioned he needs a couple refills - fills pended.

## 2025-07-30 ENCOUNTER — ANTICOAGULATION - WARFARIN VISIT (OUTPATIENT)
Dept: PHARMACY | Facility: HOSPITAL | Age: 70
End: 2025-07-30
Payer: COMMERCIAL

## 2025-07-30 DIAGNOSIS — I48.91 ATRIAL FIBRILLATION, UNSPECIFIED TYPE (MULTI): Primary | ICD-10-CM

## 2025-07-30 LAB
INR PPP: 2.3
PROTHROMBIN TIME: 23 SEC (ref 9–11.5)

## 2025-07-30 NOTE — PROGRESS NOTES
We received a lab result from 7/29 with an INR of 2.3. Mr. Lawrence reports no changes in his diet or medications. Also reports no signs or symptoms of bleeding/bruising. No extra/missed doses of warfarin.   Given his INR is in range, we will have him continue his weekly regimen of 1.25mg Tues, Thurs, Sun and 2.5mg all other days. We will follow up after he tests again in 4 weeks.

## 2025-07-30 NOTE — PATIENT INSTRUCTIONS
Your INR was in range at 2.3.  Please continue your weekly regimen of 1.25 mg Tues, Thurs, Sun and 2.5mg all other days. Retest again in 4 weeks.   If any questions arise do not hesitate to call us at [phone] M-F from 8:30am-4:30pm or at   [phone] after 5pm or on the weekends. Continue to monitor for any excess bleeding or bruising, especially for blood in your stool.

## 2025-12-10 ENCOUNTER — APPOINTMENT (OUTPATIENT)
Dept: DERMATOLOGY | Facility: CLINIC | Age: 70
End: 2025-12-10
Payer: COMMERCIAL